# Patient Record
Sex: FEMALE | Race: BLACK OR AFRICAN AMERICAN | NOT HISPANIC OR LATINO
[De-identification: names, ages, dates, MRNs, and addresses within clinical notes are randomized per-mention and may not be internally consistent; named-entity substitution may affect disease eponyms.]

---

## 2019-06-05 PROBLEM — Z00.00 ENCOUNTER FOR PREVENTIVE HEALTH EXAMINATION: Status: ACTIVE | Noted: 2019-06-05

## 2019-08-02 ENCOUNTER — APPOINTMENT (OUTPATIENT)
Dept: VASCULAR SURGERY | Facility: CLINIC | Age: 84
End: 2019-08-02
Payer: MEDICARE

## 2019-08-02 VITALS
HEIGHT: 62 IN | TEMPERATURE: 98.1 F | DIASTOLIC BLOOD PRESSURE: 72 MMHG | BODY MASS INDEX: 30.91 KG/M2 | SYSTOLIC BLOOD PRESSURE: 166 MMHG | WEIGHT: 168 LBS | HEART RATE: 75 BPM

## 2019-08-02 DIAGNOSIS — I10 ESSENTIAL (PRIMARY) HYPERTENSION: ICD-10-CM

## 2019-08-02 DIAGNOSIS — I73.9 PERIPHERAL VASCULAR DISEASE, UNSPECIFIED: ICD-10-CM

## 2019-08-02 DIAGNOSIS — E11.9 TYPE 2 DIABETES MELLITUS W/OUT COMPLICATIONS: ICD-10-CM

## 2019-08-02 DIAGNOSIS — Z87.39 PERSONAL HISTORY OF OTHER DISEASES OF THE MUSCULOSKELETAL SYSTEM AND CONNECTIVE TISSUE: ICD-10-CM

## 2019-08-02 PROCEDURE — 93923 UPR/LXTR ART STDY 3+ LVLS: CPT

## 2019-08-02 PROCEDURE — 99204 OFFICE O/P NEW MOD 45 MIN: CPT

## 2019-08-02 RX ORDER — B-COMPLEX WITH VITAMIN C
TABLET ORAL
Refills: 0 | Status: ACTIVE | COMMUNITY

## 2019-08-02 RX ORDER — METFORMIN HYDROCHLORIDE 625 MG/1
TABLET ORAL
Refills: 0 | Status: ACTIVE | COMMUNITY

## 2019-08-02 NOTE — PHYSICAL EXAM
[JVD] : no jugular venous distention  [Normal Breath Sounds] : Normal breath sounds [Normal Heart Sounds] : normal heart sounds [2+] : left 2+ [Ankle Swelling (On Exam)] : present [Ankle Swelling Bilaterally] : bilaterally  [Ankle Swelling On The Right] : mild [Varicose Veins Of Lower Extremities] : not present [] : not present [Abdomen Masses] : No abdominal masses [Skin Ulcer] : no ulcer [Oriented to Place] : oriented to place

## 2019-08-02 NOTE — ASSESSMENT
[FreeTextEntry1] : Patient with bilateral lower extremity discomfort. 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 I do not believe symptoms are related to arterial insufficiency. Recommend medical management with neurology followup. This was discussed with the patient in detail.

## 2019-08-02 NOTE — CONSULT LETTER
[Dear  ___] : Dear  [unfilled], [Consult Letter:] : I had the pleasure of evaluating your patient, [unfilled]. [Please see my note below.] : Please see my note below. [Sincerely,] : Sincerely, [Consult Closing:] : Thank you very much for allowing me to participate in the care of this patient.  If you have any questions, please do not hesitate to contact me. [FreeTextEntry3] : José Miguel Arguelles M.D., F.SKYLAR.S., R.P.V.I.\par  of Vascular Surgery\par Chief, Vascular Surgery at [facility]\par Chief, Endovascular Surgery at [facility]\par Medical Director of Endovascular Program\par [facility]\par

## 2019-08-02 NOTE — HISTORY OF PRESENT ILLNESS
[FreeTextEntry1] : Patient is an 86-year-old female with past medical history significant for diabetes, hypertension presenting to us for evaluation of circulation of both legs. Patient complaining of heaviness and discomfort in both legs, right worse than left mainly while she is sleeping. Patient also complains of significant lower back pain. No complaint of claudication symptoms. No history of tissue loss. No cardiac history. Patient is not a smoker.

## 2022-01-01 ENCOUNTER — RESULT REVIEW (OUTPATIENT)
Age: 87
End: 2022-01-01

## 2022-01-01 ENCOUNTER — INPATIENT (INPATIENT)
Facility: HOSPITAL | Age: 87
LOS: 5 days | DRG: 834 | End: 2022-04-20
Attending: INTERNAL MEDICINE | Admitting: STUDENT IN AN ORGANIZED HEALTH CARE EDUCATION/TRAINING PROGRAM
Payer: COMMERCIAL

## 2022-01-01 ENCOUNTER — TRANSCRIPTION ENCOUNTER (OUTPATIENT)
Age: 87
End: 2022-01-01

## 2022-01-01 ENCOUNTER — INPATIENT (INPATIENT)
Facility: HOSPITAL | Age: 87
LOS: 1 days | Discharge: ANOTHER HOSPITAL | End: 2022-04-14
Attending: HOSPITALIST | Admitting: HOSPITALIST
Payer: MEDICARE

## 2022-01-01 VITALS
DIASTOLIC BLOOD PRESSURE: 57 MMHG | HEART RATE: 85 BPM | OXYGEN SATURATION: 97 % | RESPIRATION RATE: 18 BRPM | SYSTOLIC BLOOD PRESSURE: 122 MMHG | TEMPERATURE: 98 F

## 2022-01-01 VITALS
HEART RATE: 160 BPM | TEMPERATURE: 97 F | OXYGEN SATURATION: 98 % | SYSTOLIC BLOOD PRESSURE: 152 MMHG | RESPIRATION RATE: 24 BRPM | DIASTOLIC BLOOD PRESSURE: 60 MMHG

## 2022-01-01 VITALS — RESPIRATION RATE: 19 BRPM | HEART RATE: 76 BPM | OXYGEN SATURATION: 98 %

## 2022-01-01 DIAGNOSIS — I10 ESSENTIAL (PRIMARY) HYPERTENSION: ICD-10-CM

## 2022-01-01 DIAGNOSIS — C91.00 ACUTE LYMPHOBLASTIC LEUKEMIA NOT HAVING ACHIEVED REMISSION: ICD-10-CM

## 2022-01-01 DIAGNOSIS — J96.01 ACUTE RESPIRATORY FAILURE WITH HYPOXIA: ICD-10-CM

## 2022-01-01 DIAGNOSIS — Z90.710 ACQUIRED ABSENCE OF BOTH CERVIX AND UTERUS: Chronic | ICD-10-CM

## 2022-01-01 DIAGNOSIS — B99.9 UNSPECIFIED INFECTIOUS DISEASE: ICD-10-CM

## 2022-01-01 DIAGNOSIS — E83.39 OTHER DISORDERS OF PHOSPHORUS METABOLISM: ICD-10-CM

## 2022-01-01 DIAGNOSIS — Z29.9 ENCOUNTER FOR PROPHYLACTIC MEASURES, UNSPECIFIED: ICD-10-CM

## 2022-01-01 DIAGNOSIS — E88.3 TUMOR LYSIS SYNDROME: ICD-10-CM

## 2022-01-01 DIAGNOSIS — C92.10 CHRONIC MYELOID LEUKEMIA, BCR/ABL-POSITIVE, NOT HAVING ACHIEVED REMISSION: ICD-10-CM

## 2022-01-01 DIAGNOSIS — E11.9 TYPE 2 DIABETES MELLITUS WITHOUT COMPLICATIONS: ICD-10-CM

## 2022-01-01 DIAGNOSIS — R09.89 OTHER SPECIFIED SYMPTOMS AND SIGNS INVOLVING THE CIRCULATORY AND RESPIRATORY SYSTEMS: ICD-10-CM

## 2022-01-01 DIAGNOSIS — Z51.5 ENCOUNTER FOR PALLIATIVE CARE: ICD-10-CM

## 2022-01-01 DIAGNOSIS — E87.6 HYPOKALEMIA: ICD-10-CM

## 2022-01-01 DIAGNOSIS — C92.00 ACUTE MYELOBLASTIC LEUKEMIA, NOT HAVING ACHIEVED REMISSION: ICD-10-CM

## 2022-01-01 DIAGNOSIS — T14.8XXA OTHER INJURY OF UNSPECIFIED BODY REGION, INITIAL ENCOUNTER: ICD-10-CM

## 2022-01-01 DIAGNOSIS — D72.829 ELEVATED WHITE BLOOD CELL COUNT, UNSPECIFIED: ICD-10-CM

## 2022-01-01 DIAGNOSIS — R53.81 OTHER MALAISE: ICD-10-CM

## 2022-01-01 DIAGNOSIS — R10.13 EPIGASTRIC PAIN: ICD-10-CM

## 2022-01-01 DIAGNOSIS — R53.83 OTHER FATIGUE: ICD-10-CM

## 2022-01-01 DIAGNOSIS — E87.8 OTHER DISORDERS OF ELECTROLYTE AND FLUID BALANCE, NOT ELSEWHERE CLASSIFIED: ICD-10-CM

## 2022-01-01 DIAGNOSIS — I48.91 UNSPECIFIED ATRIAL FIBRILLATION: ICD-10-CM

## 2022-01-01 DIAGNOSIS — D63.0 ANEMIA IN NEOPLASTIC DISEASE: ICD-10-CM

## 2022-01-01 DIAGNOSIS — R79.1 ABNORMAL COAGULATION PROFILE: ICD-10-CM

## 2022-01-01 DIAGNOSIS — D75.89 OTHER SPECIFIED DISEASES OF BLOOD AND BLOOD-FORMING ORGANS: ICD-10-CM

## 2022-01-01 DIAGNOSIS — I26.99 OTHER PULMONARY EMBOLISM WITHOUT ACUTE COR PULMONALE: ICD-10-CM

## 2022-01-01 DIAGNOSIS — I48.20 CHRONIC ATRIAL FIBRILLATION, UNSPECIFIED: ICD-10-CM

## 2022-01-01 LAB
A1C WITH ESTIMATED AVERAGE GLUCOSE RESULT: 9 % — HIGH (ref 4–5.6)
ALBUMIN SERPL ELPH-MCNC: 2.4 G/DL — LOW (ref 3.3–5)
ALBUMIN SERPL ELPH-MCNC: 2.7 G/DL — LOW (ref 3.3–5)
ALBUMIN SERPL ELPH-MCNC: 2.8 G/DL — LOW (ref 3.3–5)
ALBUMIN SERPL ELPH-MCNC: 2.9 G/DL — LOW (ref 3.3–5)
ALBUMIN SERPL ELPH-MCNC: 2.9 G/DL — LOW (ref 3.3–5)
ALBUMIN SERPL ELPH-MCNC: 3 G/DL — LOW (ref 3.3–5)
ALBUMIN SERPL ELPH-MCNC: 3.1 G/DL — LOW (ref 3.3–5)
ALBUMIN SERPL ELPH-MCNC: 3.1 G/DL — LOW (ref 3.3–5)
ALBUMIN SERPL ELPH-MCNC: 3.2 G/DL — LOW (ref 3.3–5)
ALBUMIN SERPL ELPH-MCNC: 3.2 G/DL — LOW (ref 3.3–5)
ALBUMIN SERPL ELPH-MCNC: 3.3 G/DL — SIGNIFICANT CHANGE UP (ref 3.3–5)
ALBUMIN SERPL ELPH-MCNC: 3.4 G/DL — SIGNIFICANT CHANGE UP (ref 3.3–5)
ALBUMIN SERPL ELPH-MCNC: 3.4 G/DL — SIGNIFICANT CHANGE UP (ref 3.3–5)
ALBUMIN SERPL ELPH-MCNC: 3.5 G/DL — SIGNIFICANT CHANGE UP (ref 3.3–5)
ALP SERPL-CCNC: 50 U/L — SIGNIFICANT CHANGE UP (ref 40–120)
ALP SERPL-CCNC: 50 U/L — SIGNIFICANT CHANGE UP (ref 40–120)
ALP SERPL-CCNC: 51 U/L — SIGNIFICANT CHANGE UP (ref 40–120)
ALP SERPL-CCNC: 51 U/L — SIGNIFICANT CHANGE UP (ref 40–120)
ALP SERPL-CCNC: 53 U/L — SIGNIFICANT CHANGE UP (ref 40–120)
ALP SERPL-CCNC: 56 U/L — SIGNIFICANT CHANGE UP (ref 40–120)
ALP SERPL-CCNC: 57 U/L — SIGNIFICANT CHANGE UP (ref 40–120)
ALP SERPL-CCNC: 58 U/L — SIGNIFICANT CHANGE UP (ref 40–120)
ALP SERPL-CCNC: 59 U/L — SIGNIFICANT CHANGE UP (ref 40–120)
ALP SERPL-CCNC: 60 U/L — SIGNIFICANT CHANGE UP (ref 40–120)
ALP SERPL-CCNC: 61 U/L — SIGNIFICANT CHANGE UP (ref 40–120)
ALP SERPL-CCNC: 63 U/L — SIGNIFICANT CHANGE UP (ref 40–120)
ALP SERPL-CCNC: 64 U/L — SIGNIFICANT CHANGE UP (ref 40–120)
ALP SERPL-CCNC: 64 U/L — SIGNIFICANT CHANGE UP (ref 40–120)
ALP SERPL-CCNC: 65 U/L — SIGNIFICANT CHANGE UP (ref 40–120)
ALP SERPL-CCNC: 78 U/L — SIGNIFICANT CHANGE UP (ref 40–120)
ALP SERPL-CCNC: 79 U/L — SIGNIFICANT CHANGE UP (ref 40–120)
ALP SERPL-CCNC: 81 U/L — SIGNIFICANT CHANGE UP (ref 40–120)
ALP SERPL-CCNC: 88 U/L — SIGNIFICANT CHANGE UP (ref 40–120)
ALT FLD-CCNC: 13 U/L — SIGNIFICANT CHANGE UP (ref 4–33)
ALT FLD-CCNC: 16 U/L — SIGNIFICANT CHANGE UP (ref 4–33)
ALT FLD-CCNC: 17 U/L — SIGNIFICANT CHANGE UP (ref 4–33)
ALT FLD-CCNC: 17 U/L — SIGNIFICANT CHANGE UP (ref 4–33)
ALT FLD-CCNC: 19 U/L — SIGNIFICANT CHANGE UP (ref 4–33)
ALT FLD-CCNC: 21 U/L — SIGNIFICANT CHANGE UP (ref 10–45)
ALT FLD-CCNC: 22 U/L — SIGNIFICANT CHANGE UP (ref 10–45)
ALT FLD-CCNC: 24 U/L — SIGNIFICANT CHANGE UP (ref 10–45)
ALT FLD-CCNC: 25 U/L — SIGNIFICANT CHANGE UP (ref 10–45)
ALT FLD-CCNC: 25 U/L — SIGNIFICANT CHANGE UP (ref 4–33)
ALT FLD-CCNC: 253 U/L — HIGH (ref 10–45)
ALT FLD-CCNC: 26 U/L — SIGNIFICANT CHANGE UP (ref 10–45)
ALT FLD-CCNC: 28 U/L — SIGNIFICANT CHANGE UP (ref 10–45)
ALT FLD-CCNC: 28 U/L — SIGNIFICANT CHANGE UP (ref 10–45)
ALT FLD-CCNC: 30 U/L — SIGNIFICANT CHANGE UP (ref 10–45)
ALT FLD-CCNC: 30 U/L — SIGNIFICANT CHANGE UP (ref 10–45)
ALT FLD-CCNC: 31 U/L — SIGNIFICANT CHANGE UP (ref 10–45)
ALT FLD-CCNC: 31 U/L — SIGNIFICANT CHANGE UP (ref 10–45)
ALT FLD-CCNC: 32 U/L — SIGNIFICANT CHANGE UP (ref 10–45)
ANION GAP SERPL CALC-SCNC: 10 MMOL/L — SIGNIFICANT CHANGE UP (ref 5–17)
ANION GAP SERPL CALC-SCNC: 11 MMOL/L — SIGNIFICANT CHANGE UP (ref 5–17)
ANION GAP SERPL CALC-SCNC: 12 MMOL/L — SIGNIFICANT CHANGE UP (ref 5–17)
ANION GAP SERPL CALC-SCNC: 13 MMOL/L — SIGNIFICANT CHANGE UP (ref 5–17)
ANION GAP SERPL CALC-SCNC: 13 MMOL/L — SIGNIFICANT CHANGE UP (ref 5–17)
ANION GAP SERPL CALC-SCNC: 13 MMOL/L — SIGNIFICANT CHANGE UP (ref 7–14)
ANION GAP SERPL CALC-SCNC: 14 MMOL/L — SIGNIFICANT CHANGE UP (ref 7–14)
ANION GAP SERPL CALC-SCNC: 15 MMOL/L — HIGH (ref 7–14)
ANION GAP SERPL CALC-SCNC: 15 MMOL/L — SIGNIFICANT CHANGE UP (ref 5–17)
ANION GAP SERPL CALC-SCNC: 16 MMOL/L — HIGH (ref 7–14)
ANION GAP SERPL CALC-SCNC: 16 MMOL/L — SIGNIFICANT CHANGE UP (ref 5–17)
ANION GAP SERPL CALC-SCNC: 17 MMOL/L — HIGH (ref 7–14)
ANION GAP SERPL CALC-SCNC: 17 MMOL/L — SIGNIFICANT CHANGE UP (ref 5–17)
ANION GAP SERPL CALC-SCNC: 18 MMOL/L — HIGH (ref 5–17)
ANION GAP SERPL CALC-SCNC: 21 MMOL/L — HIGH (ref 7–14)
ANION GAP SERPL CALC-SCNC: 29 MMOL/L — HIGH (ref 5–17)
ANION GAP SERPL CALC-SCNC: 8 MMOL/L — SIGNIFICANT CHANGE UP (ref 5–17)
ANISOCYTOSIS BLD QL: SLIGHT — SIGNIFICANT CHANGE UP
APTT BLD: 103 SEC — HIGH (ref 27.5–35.5)
APTT BLD: 110.3 SEC — HIGH (ref 27.5–35.5)
APTT BLD: 134.3 SEC — CRITICAL HIGH (ref 27.5–35.5)
APTT BLD: 153.5 SEC — CRITICAL HIGH (ref 27.5–35.5)
APTT BLD: 21.5 SEC — LOW (ref 27–36.3)
APTT BLD: 22.6 SEC — LOW (ref 27.5–35.5)
APTT BLD: 25 SEC — LOW (ref 27.5–35.5)
APTT BLD: 25.1 SEC — LOW (ref 27.5–35.5)
APTT BLD: 49.1 SEC — HIGH (ref 27.5–35.5)
APTT BLD: 49.5 SEC — HIGH (ref 27.5–35.5)
APTT BLD: 54.1 SEC — HIGH (ref 27.5–35.5)
APTT BLD: 54.6 SEC — HIGH (ref 27.5–35.5)
APTT BLD: 56.2 SEC — HIGH (ref 27.5–35.5)
APTT BLD: 58.7 SEC — HIGH (ref 27–36.3)
APTT BLD: 61.2 SEC — HIGH (ref 27.5–35.5)
APTT BLD: 63 SEC — HIGH (ref 27.5–35.5)
APTT BLD: 68 SEC — HIGH (ref 27.5–35.5)
APTT BLD: 72.3 SEC — HIGH (ref 27–36.3)
APTT BLD: 74.3 SEC — HIGH (ref 27.5–35.5)
APTT BLD: 87.6 SEC — HIGH (ref 27.5–35.5)
APTT BLD: 95 SEC — HIGH (ref 27.5–35.5)
APTT BLD: 99.1 SEC — HIGH (ref 27.5–35.5)
APTT BLD: >200 SEC — CRITICAL HIGH (ref 27.5–35.5)
AST SERPL-CCNC: 22 U/L — SIGNIFICANT CHANGE UP (ref 10–40)
AST SERPL-CCNC: 26 U/L — SIGNIFICANT CHANGE UP (ref 4–32)
AST SERPL-CCNC: 28 U/L — SIGNIFICANT CHANGE UP (ref 10–40)
AST SERPL-CCNC: 29 U/L — SIGNIFICANT CHANGE UP (ref 4–32)
AST SERPL-CCNC: 31 U/L — SIGNIFICANT CHANGE UP (ref 10–40)
AST SERPL-CCNC: 32 U/L — SIGNIFICANT CHANGE UP (ref 10–40)
AST SERPL-CCNC: 33 U/L — SIGNIFICANT CHANGE UP (ref 10–40)
AST SERPL-CCNC: 33 U/L — SIGNIFICANT CHANGE UP (ref 10–40)
AST SERPL-CCNC: 34 U/L — HIGH (ref 4–32)
AST SERPL-CCNC: 36 U/L — HIGH (ref 4–32)
AST SERPL-CCNC: 37 U/L — HIGH (ref 4–32)
AST SERPL-CCNC: 40 U/L — HIGH (ref 4–32)
AST SERPL-CCNC: 40 U/L — SIGNIFICANT CHANGE UP (ref 10–40)
AST SERPL-CCNC: 41 U/L — HIGH (ref 10–40)
AST SERPL-CCNC: 41 U/L — HIGH (ref 10–40)
AST SERPL-CCNC: 42 U/L — HIGH (ref 10–40)
AST SERPL-CCNC: 440 U/L — HIGH (ref 10–40)
AST SERPL-CCNC: 45 U/L — HIGH (ref 10–40)
AST SERPL-CCNC: 57 U/L — HIGH (ref 10–40)
B PERT DNA SPEC QL NAA+PROBE: SIGNIFICANT CHANGE UP
B PERT+PARAPERT DNA PNL SPEC NAA+PROBE: SIGNIFICANT CHANGE UP
B-LACTOGLOB IGG RAST: SIGNIFICANT CHANGE UP
BASE EXCESS BLDV CALC-SCNC: -7 MMOL/L — LOW (ref -2–2)
BASE EXCESS BLDV CALC-SCNC: 2.8 MMOL/L — SIGNIFICANT CHANGE UP (ref -2–3)
BASOPHILS # BLD AUTO: 0 K/UL — SIGNIFICANT CHANGE UP (ref 0–0.2)
BASOPHILS # BLD AUTO: 0.04 K/UL — SIGNIFICANT CHANGE UP (ref 0–0.2)
BASOPHILS # BLD AUTO: 0.07 K/UL — SIGNIFICANT CHANGE UP (ref 0–0.2)
BASOPHILS # BLD AUTO: 0.13 K/UL — SIGNIFICANT CHANGE UP (ref 0–0.2)
BASOPHILS # BLD AUTO: 0.18 K/UL — SIGNIFICANT CHANGE UP (ref 0–0.2)
BASOPHILS NFR BLD AUTO: 0 % — SIGNIFICANT CHANGE UP (ref 0–2)
BASOPHILS NFR BLD AUTO: 0.1 % — SIGNIFICANT CHANGE UP (ref 0–2)
BASOPHILS NFR BLD AUTO: 0.2 % — SIGNIFICANT CHANGE UP (ref 0–2)
BCR/ABL BY RT - PCR QUANTITATIVE: SIGNIFICANT CHANGE UP
BILIRUB DIRECT SERPL-MCNC: 0.6 MG/DL — HIGH (ref 0–0.3)
BILIRUB SERPL-MCNC: 0.6 MG/DL — SIGNIFICANT CHANGE UP (ref 0.2–1.2)
BILIRUB SERPL-MCNC: 0.6 MG/DL — SIGNIFICANT CHANGE UP (ref 0.2–1.2)
BILIRUB SERPL-MCNC: 0.7 MG/DL — SIGNIFICANT CHANGE UP (ref 0.2–1.2)
BILIRUB SERPL-MCNC: 0.8 MG/DL — SIGNIFICANT CHANGE UP (ref 0.2–1.2)
BILIRUB SERPL-MCNC: 1 MG/DL — SIGNIFICANT CHANGE UP (ref 0.2–1.2)
BILIRUB SERPL-MCNC: 1.1 MG/DL — SIGNIFICANT CHANGE UP (ref 0.2–1.2)
BILIRUB SERPL-MCNC: 1.1 MG/DL — SIGNIFICANT CHANGE UP (ref 0.2–1.2)
BILIRUB SERPL-MCNC: 1.2 MG/DL — SIGNIFICANT CHANGE UP (ref 0.2–1.2)
BILIRUB SERPL-MCNC: 1.2 MG/DL — SIGNIFICANT CHANGE UP (ref 0.2–1.2)
BILIRUB SERPL-MCNC: 1.3 MG/DL — HIGH (ref 0.2–1.2)
BILIRUB SERPL-MCNC: 1.4 MG/DL — HIGH (ref 0.2–1.2)
BILIRUB SERPL-MCNC: 1.6 MG/DL — HIGH (ref 0.2–1.2)
BILIRUB SERPL-MCNC: 1.8 MG/DL — HIGH (ref 0.2–1.2)
BILIRUB SERPL-MCNC: 2 MG/DL — HIGH (ref 0.2–1.2)
BLASTS # FLD: 88 % — CRITICAL HIGH (ref 0–0)
BLD GP AB SCN SERPL QL: NEGATIVE — SIGNIFICANT CHANGE UP
BLOOD GAS VENOUS COMPREHENSIVE RESULT: SIGNIFICANT CHANGE UP
BLUEBERRY IGG RAST: (no result)
BORDETELLA PARAPERTUSSIS (RAPRVP): SIGNIFICANT CHANGE UP
BROCCOLI IGG RAST: SIGNIFICANT CHANGE UP
BUN SERPL-MCNC: 14 MG/DL — SIGNIFICANT CHANGE UP (ref 7–23)
BUN SERPL-MCNC: 15 MG/DL — SIGNIFICANT CHANGE UP (ref 7–23)
BUN SERPL-MCNC: 16 MG/DL — SIGNIFICANT CHANGE UP (ref 7–23)
BUN SERPL-MCNC: 18 MG/DL — SIGNIFICANT CHANGE UP (ref 7–23)
BUN SERPL-MCNC: 18 MG/DL — SIGNIFICANT CHANGE UP (ref 7–23)
BUN SERPL-MCNC: 19 MG/DL — SIGNIFICANT CHANGE UP (ref 7–23)
BUN SERPL-MCNC: 20 MG/DL — SIGNIFICANT CHANGE UP (ref 7–23)
BUN SERPL-MCNC: 20 MG/DL — SIGNIFICANT CHANGE UP (ref 7–23)
BUN SERPL-MCNC: 23 MG/DL — SIGNIFICANT CHANGE UP (ref 7–23)
BUN SERPL-MCNC: 23 MG/DL — SIGNIFICANT CHANGE UP (ref 7–23)
BUN SERPL-MCNC: 27 MG/DL — HIGH (ref 7–23)
BUN SERPL-MCNC: 28 MG/DL — HIGH (ref 7–23)
BUN SERPL-MCNC: 28 MG/DL — HIGH (ref 7–23)
BUN SERPL-MCNC: 30 MG/DL — HIGH (ref 7–23)
BUN SERPL-MCNC: 30 MG/DL — HIGH (ref 7–23)
C PNEUM DNA SPEC QL NAA+PROBE: SIGNIFICANT CHANGE UP
CA-I SERPL-SCNC: 0.91 MMOL/L — LOW (ref 1.15–1.33)
CABBAGE IGG RAST: SIGNIFICANT CHANGE UP
CALCIUM SERPL-MCNC: 12.8 MG/DL — HIGH (ref 8.4–10.5)
CALCIUM SERPL-MCNC: 7.1 MG/DL — LOW (ref 8.4–10.5)
CALCIUM SERPL-MCNC: 7.3 MG/DL — LOW (ref 8.4–10.5)
CALCIUM SERPL-MCNC: 7.4 MG/DL — LOW (ref 8.4–10.5)
CALCIUM SERPL-MCNC: 7.5 MG/DL — LOW (ref 8.4–10.5)
CALCIUM SERPL-MCNC: 7.6 MG/DL — LOW (ref 8.4–10.5)
CALCIUM SERPL-MCNC: 7.6 MG/DL — LOW (ref 8.4–10.5)
CALCIUM SERPL-MCNC: 7.7 MG/DL — LOW (ref 8.4–10.5)
CALCIUM SERPL-MCNC: 7.8 MG/DL — LOW (ref 8.4–10.5)
CALCIUM SERPL-MCNC: 7.9 MG/DL — LOW (ref 8.4–10.5)
CALCIUM SERPL-MCNC: 8.3 MG/DL — LOW (ref 8.4–10.5)
CALCIUM SERPL-MCNC: 8.7 MG/DL — SIGNIFICANT CHANGE UP (ref 8.4–10.5)
CALCIUM SERPL-MCNC: 9 MG/DL — SIGNIFICANT CHANGE UP (ref 8.4–10.5)
CALCIUM SERPL-MCNC: 9.1 MG/DL — SIGNIFICANT CHANGE UP (ref 8.4–10.5)
CALCIUM SERPL-MCNC: 9.5 MG/DL — SIGNIFICANT CHANGE UP (ref 8.4–10.5)
CARDAMON IGE RAST: SIGNIFICANT CHANGE UP
CARP IGE RAST: SIGNIFICANT CHANGE UP
CARROT IGG RAST: SIGNIFICANT CHANGE UP
CASEIN IGG RAST: SIGNIFICANT CHANGE UP
CAULIFLOWER IGG RAST: SIGNIFICANT CHANGE UP
CHLORIDE BLDV-SCNC: 102 MMOL/L — SIGNIFICANT CHANGE UP (ref 96–108)
CHLORIDE BLDV-SCNC: 111 MMOL/L — HIGH (ref 96–108)
CHLORIDE SERPL-SCNC: 100 MMOL/L — SIGNIFICANT CHANGE UP (ref 98–107)
CHLORIDE SERPL-SCNC: 100 MMOL/L — SIGNIFICANT CHANGE UP (ref 98–107)
CHLORIDE SERPL-SCNC: 101 MMOL/L — SIGNIFICANT CHANGE UP (ref 96–108)
CHLORIDE SERPL-SCNC: 102 MMOL/L — SIGNIFICANT CHANGE UP (ref 96–108)
CHLORIDE SERPL-SCNC: 102 MMOL/L — SIGNIFICANT CHANGE UP (ref 98–107)
CHLORIDE SERPL-SCNC: 103 MMOL/L — SIGNIFICANT CHANGE UP (ref 96–108)
CHLORIDE SERPL-SCNC: 103 MMOL/L — SIGNIFICANT CHANGE UP (ref 98–107)
CHLORIDE SERPL-SCNC: 103 MMOL/L — SIGNIFICANT CHANGE UP (ref 98–107)
CHLORIDE SERPL-SCNC: 104 MMOL/L — SIGNIFICANT CHANGE UP (ref 96–108)
CHLORIDE SERPL-SCNC: 84 MMOL/L — LOW (ref 98–107)
CHLORIDE SERPL-SCNC: 98 MMOL/L — SIGNIFICANT CHANGE UP (ref 96–108)
CHLORIDE SERPL-SCNC: 98 MMOL/L — SIGNIFICANT CHANGE UP (ref 96–108)
CHROM ANALY INTERPHASE BLD FISH-IMP: SIGNIFICANT CHANGE UP
CHROM ANALY INTERPHASE BLD FISH-IMP: SIGNIFICANT CHANGE UP
CO2 BLDV-SCNC: 20 MMOL/L — LOW (ref 22–26)
CO2 BLDV-SCNC: 28.4 MMOL/L — HIGH (ref 22–26)
CO2 SERPL-SCNC: 12 MMOL/L — LOW (ref 22–31)
CO2 SERPL-SCNC: 16 MMOL/L — LOW (ref 22–31)
CO2 SERPL-SCNC: 16 MMOL/L — LOW (ref 22–31)
CO2 SERPL-SCNC: 18 MMOL/L — LOW (ref 22–31)
CO2 SERPL-SCNC: 22 MMOL/L — SIGNIFICANT CHANGE UP (ref 22–31)
CO2 SERPL-SCNC: 23 MMOL/L — SIGNIFICANT CHANGE UP (ref 22–31)
CO2 SERPL-SCNC: 24 MMOL/L — SIGNIFICANT CHANGE UP (ref 22–31)
CO2 SERPL-SCNC: 25 MMOL/L — SIGNIFICANT CHANGE UP (ref 22–31)
CO2 SERPL-SCNC: 26 MMOL/L — SIGNIFICANT CHANGE UP (ref 22–31)
CO2 SERPL-SCNC: 27 MMOL/L — SIGNIFICANT CHANGE UP (ref 22–31)
CREAT SERPL-MCNC: 0.7 MG/DL — SIGNIFICANT CHANGE UP (ref 0.5–1.3)
CREAT SERPL-MCNC: 0.72 MG/DL — SIGNIFICANT CHANGE UP (ref 0.5–1.3)
CREAT SERPL-MCNC: 0.73 MG/DL — SIGNIFICANT CHANGE UP (ref 0.5–1.3)
CREAT SERPL-MCNC: 0.74 MG/DL — SIGNIFICANT CHANGE UP (ref 0.5–1.3)
CREAT SERPL-MCNC: 0.77 MG/DL — SIGNIFICANT CHANGE UP (ref 0.5–1.3)
CREAT SERPL-MCNC: 0.78 MG/DL — SIGNIFICANT CHANGE UP (ref 0.5–1.3)
CREAT SERPL-MCNC: 0.8 MG/DL — SIGNIFICANT CHANGE UP (ref 0.5–1.3)
CREAT SERPL-MCNC: 0.81 MG/DL — SIGNIFICANT CHANGE UP (ref 0.5–1.3)
CREAT SERPL-MCNC: 0.82 MG/DL — SIGNIFICANT CHANGE UP (ref 0.5–1.3)
CREAT SERPL-MCNC: 0.85 MG/DL — SIGNIFICANT CHANGE UP (ref 0.5–1.3)
CREAT SERPL-MCNC: 0.86 MG/DL — SIGNIFICANT CHANGE UP (ref 0.5–1.3)
CREAT SERPL-MCNC: 0.86 MG/DL — SIGNIFICANT CHANGE UP (ref 0.5–1.3)
CREAT SERPL-MCNC: 1.06 MG/DL — SIGNIFICANT CHANGE UP (ref 0.5–1.3)
CULTURE RESULTS: SIGNIFICANT CHANGE UP
CULTURE RESULTS: SIGNIFICANT CHANGE UP
D DIMER BLD IA.RAPID-MCNC: 3903 NG/ML DDU — HIGH
D DIMER BLD IA.RAPID-MCNC: 5857 NG/ML DDU — HIGH
D DIMER BLD IA.RAPID-MCNC: 9395 NG/ML DDU — HIGH
D DIMER BLD IA.RAPID-MCNC: HIGH NG/ML DDU
D DIMER BLD IA.RAPID-MCNC: SIGNIFICANT CHANGE UP NG/ML DDU
EGFR: 50 ML/MIN/1.73M2 — LOW
EGFR: 65 ML/MIN/1.73M2 — SIGNIFICANT CHANGE UP
EGFR: 68 ML/MIN/1.73M2 — SIGNIFICANT CHANGE UP
EGFR: 69 ML/MIN/1.73M2 — SIGNIFICANT CHANGE UP
EGFR: 70 ML/MIN/1.73M2 — SIGNIFICANT CHANGE UP
EGFR: 73 ML/MIN/1.73M2 — SIGNIFICANT CHANGE UP
EGFR: 74 ML/MIN/1.73M2 — SIGNIFICANT CHANGE UP
EGFR: 77 ML/MIN/1.73M2 — SIGNIFICANT CHANGE UP
EGFR: 79 ML/MIN/1.73M2 — SIGNIFICANT CHANGE UP
EGFR: 80 ML/MIN/1.73M2 — SIGNIFICANT CHANGE UP
EGFR: 83 ML/MIN/1.73M2 — SIGNIFICANT CHANGE UP
EOSINOPHIL # BLD AUTO: 0 K/UL — SIGNIFICANT CHANGE UP (ref 0–0.5)
EOSINOPHIL # BLD AUTO: 0.01 K/UL — SIGNIFICANT CHANGE UP (ref 0–0.5)
EOSINOPHIL NFR BLD AUTO: 0 % — SIGNIFICANT CHANGE UP (ref 0–6)
ESTIMATED AVERAGE GLUCOSE: 212 — SIGNIFICANT CHANGE UP
FIBRINOGEN PPP-MCNC: 267 MG/DL — LOW (ref 330–520)
FIBRINOGEN PPP-MCNC: 274 MG/DL — LOW (ref 330–520)
FIBRINOGEN PPP-MCNC: 284 MG/DL — LOW (ref 330–520)
FIBRINOGEN PPP-MCNC: 314 MG/DL — LOW (ref 330–520)
FIBRINOGEN PPP-MCNC: 369 MG/DL — SIGNIFICANT CHANGE UP (ref 330–520)
FIBRINOGEN PPP-MCNC: 374 MG/DL — SIGNIFICANT CHANGE UP (ref 330–520)
FIBRINOGEN PPP-MCNC: 401 MG/DL — SIGNIFICANT CHANGE UP (ref 330–520)
FIBRINOGEN PPP-MCNC: 477 MG/DL — SIGNIFICANT CHANGE UP (ref 330–520)
FLUAV SUBTYP SPEC NAA+PROBE: SIGNIFICANT CHANGE UP
FLUBV RNA SPEC QL NAA+PROBE: SIGNIFICANT CHANGE UP
G6PD RBC-CCNC: 32.6 U/G HGB — HIGH (ref 7–20.5)
GAS PNL BLDA: SIGNIFICANT CHANGE UP
GAS PNL BLDV: 136 MMOL/L — SIGNIFICANT CHANGE UP (ref 136–145)
GAS PNL BLDV: 140 MMOL/L — SIGNIFICANT CHANGE UP (ref 136–145)
GAS PNL BLDV: SIGNIFICANT CHANGE UP
GLUCOSE BLDC GLUCOMTR-MCNC: 105 MG/DL — HIGH (ref 70–99)
GLUCOSE BLDC GLUCOMTR-MCNC: 114 MG/DL — HIGH (ref 70–99)
GLUCOSE BLDC GLUCOMTR-MCNC: 117 MG/DL — HIGH (ref 70–99)
GLUCOSE BLDC GLUCOMTR-MCNC: 135 MG/DL — HIGH (ref 70–99)
GLUCOSE BLDC GLUCOMTR-MCNC: 141 MG/DL — HIGH (ref 70–99)
GLUCOSE BLDC GLUCOMTR-MCNC: 149 MG/DL — HIGH (ref 70–99)
GLUCOSE BLDC GLUCOMTR-MCNC: 152 MG/DL — HIGH (ref 70–99)
GLUCOSE BLDC GLUCOMTR-MCNC: 157 MG/DL — HIGH (ref 70–99)
GLUCOSE BLDC GLUCOMTR-MCNC: 158 MG/DL — HIGH (ref 70–99)
GLUCOSE BLDC GLUCOMTR-MCNC: 164 MG/DL — HIGH (ref 70–99)
GLUCOSE BLDC GLUCOMTR-MCNC: 165 MG/DL — HIGH (ref 70–99)
GLUCOSE BLDC GLUCOMTR-MCNC: 221 MG/DL — HIGH (ref 70–99)
GLUCOSE BLDC GLUCOMTR-MCNC: 224 MG/DL — HIGH (ref 70–99)
GLUCOSE BLDC GLUCOMTR-MCNC: 226 MG/DL — HIGH (ref 70–99)
GLUCOSE BLDC GLUCOMTR-MCNC: 236 MG/DL — HIGH (ref 70–99)
GLUCOSE BLDC GLUCOMTR-MCNC: 242 MG/DL — HIGH (ref 70–99)
GLUCOSE BLDC GLUCOMTR-MCNC: 243 MG/DL — HIGH (ref 70–99)
GLUCOSE BLDC GLUCOMTR-MCNC: 260 MG/DL — HIGH (ref 70–99)
GLUCOSE BLDC GLUCOMTR-MCNC: 266 MG/DL — HIGH (ref 70–99)
GLUCOSE BLDC GLUCOMTR-MCNC: 271 MG/DL — HIGH (ref 70–99)
GLUCOSE BLDC GLUCOMTR-MCNC: 299 MG/DL — HIGH (ref 70–99)
GLUCOSE BLDC GLUCOMTR-MCNC: 307 MG/DL — HIGH (ref 70–99)
GLUCOSE BLDC GLUCOMTR-MCNC: 334 MG/DL — HIGH (ref 70–99)
GLUCOSE BLDC GLUCOMTR-MCNC: 351 MG/DL — HIGH (ref 70–99)
GLUCOSE BLDC GLUCOMTR-MCNC: 363 MG/DL — HIGH (ref 70–99)
GLUCOSE BLDC GLUCOMTR-MCNC: 380 MG/DL — HIGH (ref 70–99)
GLUCOSE BLDC GLUCOMTR-MCNC: 398 MG/DL — HIGH (ref 70–99)
GLUCOSE BLDC GLUCOMTR-MCNC: 400 MG/DL — HIGH (ref 70–99)
GLUCOSE BLDC GLUCOMTR-MCNC: 407 MG/DL — HIGH (ref 70–99)
GLUCOSE BLDC GLUCOMTR-MCNC: 54 MG/DL — CRITICAL LOW (ref 70–99)
GLUCOSE BLDC GLUCOMTR-MCNC: 62 MG/DL — LOW (ref 70–99)
GLUCOSE BLDC GLUCOMTR-MCNC: 67 MG/DL — LOW (ref 70–99)
GLUCOSE BLDC GLUCOMTR-MCNC: 69 MG/DL — LOW (ref 70–99)
GLUCOSE BLDC GLUCOMTR-MCNC: 74 MG/DL — SIGNIFICANT CHANGE UP (ref 70–99)
GLUCOSE BLDC GLUCOMTR-MCNC: 76 MG/DL — SIGNIFICANT CHANGE UP (ref 70–99)
GLUCOSE BLDC GLUCOMTR-MCNC: 78 MG/DL — SIGNIFICANT CHANGE UP (ref 70–99)
GLUCOSE BLDC GLUCOMTR-MCNC: 83 MG/DL — SIGNIFICANT CHANGE UP (ref 70–99)
GLUCOSE BLDC GLUCOMTR-MCNC: 95 MG/DL — SIGNIFICANT CHANGE UP (ref 70–99)
GLUCOSE BLDV-MCNC: 156 MG/DL — HIGH (ref 70–99)
GLUCOSE BLDV-MCNC: 336 MG/DL — HIGH (ref 70–99)
GLUCOSE SERPL-MCNC: 132 MG/DL — HIGH (ref 70–99)
GLUCOSE SERPL-MCNC: 143 MG/DL — HIGH (ref 70–99)
GLUCOSE SERPL-MCNC: 163 MG/DL — HIGH (ref 70–99)
GLUCOSE SERPL-MCNC: 189 MG/DL — HIGH (ref 70–99)
GLUCOSE SERPL-MCNC: 198 MG/DL — HIGH (ref 70–99)
GLUCOSE SERPL-MCNC: 216 MG/DL — HIGH (ref 70–99)
GLUCOSE SERPL-MCNC: 285 MG/DL — HIGH (ref 70–99)
GLUCOSE SERPL-MCNC: 293 MG/DL — HIGH (ref 70–99)
GLUCOSE SERPL-MCNC: 328 MG/DL — HIGH (ref 70–99)
GLUCOSE SERPL-MCNC: 331 MG/DL — HIGH (ref 70–99)
GLUCOSE SERPL-MCNC: 337 MG/DL — HIGH (ref 70–99)
GLUCOSE SERPL-MCNC: 343 MG/DL — HIGH (ref 70–99)
GLUCOSE SERPL-MCNC: 345 MG/DL — HIGH (ref 70–99)
GLUCOSE SERPL-MCNC: 377 MG/DL — HIGH (ref 70–99)
GLUCOSE SERPL-MCNC: 56 MG/DL — LOW (ref 70–99)
GLUCOSE SERPL-MCNC: 58 MG/DL — LOW (ref 70–99)
GLUCOSE SERPL-MCNC: 63 MG/DL — LOW (ref 70–99)
GLUCOSE SERPL-MCNC: 71 MG/DL — SIGNIFICANT CHANGE UP (ref 70–99)
GLUCOSE SERPL-MCNC: 73 MG/DL — SIGNIFICANT CHANGE UP (ref 70–99)
HADV DNA SPEC QL NAA+PROBE: SIGNIFICANT CHANGE UP
HAPTOGLOB SERPL-MCNC: <20 MG/DL — LOW (ref 34–200)
HAV IGM SER-ACNC: SIGNIFICANT CHANGE UP
HBV CORE AB SER-ACNC: REACTIVE
HBV CORE IGM SER-ACNC: SIGNIFICANT CHANGE UP
HBV DNA # SERPL NAA+PROBE: SIGNIFICANT CHANGE UP
HBV DNA SERPL NAA+PROBE-LOG#: SIGNIFICANT CHANGE UP LOGIU/ML
HBV SURFACE AB SER-ACNC: REACTIVE
HBV SURFACE AG SER-ACNC: SIGNIFICANT CHANGE UP
HCO3 BLDV-SCNC: 19 MMOL/L — LOW (ref 22–29)
HCO3 BLDV-SCNC: 27 MMOL/L — SIGNIFICANT CHANGE UP (ref 22–29)
HCOV 229E RNA SPEC QL NAA+PROBE: SIGNIFICANT CHANGE UP
HCOV HKU1 RNA SPEC QL NAA+PROBE: SIGNIFICANT CHANGE UP
HCOV NL63 RNA SPEC QL NAA+PROBE: SIGNIFICANT CHANGE UP
HCOV OC43 RNA SPEC QL NAA+PROBE: SIGNIFICANT CHANGE UP
HCT VFR BLD CALC: 20.1 % — CRITICAL LOW (ref 34.5–45)
HCT VFR BLD CALC: 20.3 % — CRITICAL LOW (ref 34.5–45)
HCT VFR BLD CALC: 20.5 % — CRITICAL LOW (ref 34.5–45)
HCT VFR BLD CALC: 20.8 % — CRITICAL LOW (ref 34.5–45)
HCT VFR BLD CALC: 20.8 % — CRITICAL LOW (ref 34.5–45)
HCT VFR BLD CALC: 20.9 % — CRITICAL LOW (ref 34.5–45)
HCT VFR BLD CALC: 21.1 % — LOW (ref 34.5–45)
HCT VFR BLD CALC: 21.4 % — LOW (ref 34.5–45)
HCT VFR BLD CALC: 21.5 % — LOW (ref 34.5–45)
HCT VFR BLD CALC: 21.5 % — LOW (ref 34.5–45)
HCT VFR BLD CALC: 21.9 % — LOW (ref 34.5–45)
HCT VFR BLD CALC: 22 % — LOW (ref 34.5–45)
HCT VFR BLD CALC: 22.2 % — LOW (ref 34.5–45)
HCT VFR BLD CALC: 22.7 % — LOW (ref 34.5–45)
HCT VFR BLD CALC: 22.9 % — LOW (ref 34.5–45)
HCT VFR BLD CALC: 22.9 % — LOW (ref 34.5–45)
HCT VFR BLD CALC: 23.2 % — LOW (ref 34.5–45)
HCT VFR BLD CALC: 23.7 % — LOW (ref 34.5–45)
HCT VFR BLD CALC: 24.2 % — LOW (ref 34.5–45)
HCT VFR BLD CALC: 24.6 % — LOW (ref 34.5–45)
HCT VFR BLD CALC: 26.4 % — LOW (ref 34.5–45)
HCT VFR BLD CALC: 29.7 % — LOW (ref 34.5–45)
HCT VFR BLDA CALC: 21 % — CRITICAL LOW (ref 34.5–46.5)
HCT VFR BLDA CALC: 22 % — LOW (ref 34.5–46.5)
HCV AB S/CO SERPL IA: 0.59 S/CO — SIGNIFICANT CHANGE UP (ref 0–0.99)
HCV AB SERPL-IMP: SIGNIFICANT CHANGE UP
HEMATOPATHOLOGY REPORT: SIGNIFICANT CHANGE UP
HGB BLD CALC-MCNC: 7.1 G/DL — LOW (ref 11.5–15.5)
HGB BLD CALC-MCNC: 7.2 G/DL — LOW (ref 11.7–16.1)
HGB BLD-MCNC: 6.8 G/DL — CRITICAL LOW (ref 11.5–15.5)
HGB BLD-MCNC: 6.9 G/DL — CRITICAL LOW (ref 11.5–15.5)
HGB BLD-MCNC: 7 G/DL — CRITICAL LOW (ref 11.5–15.5)
HGB BLD-MCNC: 7 G/DL — CRITICAL LOW (ref 11.5–15.5)
HGB BLD-MCNC: 7.2 G/DL — LOW (ref 11.5–15.5)
HGB BLD-MCNC: 7.3 G/DL — LOW (ref 11.5–15.5)
HGB BLD-MCNC: 7.5 G/DL — LOW (ref 11.5–15.5)
HGB BLD-MCNC: 7.7 G/DL — LOW (ref 11.5–15.5)
HGB BLD-MCNC: 7.7 G/DL — LOW (ref 11.5–15.5)
HGB BLD-MCNC: 7.8 G/DL — LOW (ref 11.5–15.5)
HGB BLD-MCNC: 7.8 G/DL — LOW (ref 11.5–15.5)
HGB BLD-MCNC: 7.9 G/DL — LOW (ref 11.5–15.5)
HGB BLD-MCNC: 8 G/DL — LOW (ref 11.5–15.5)
HGB BLD-MCNC: 8.1 G/DL — LOW (ref 11.5–15.5)
HGB BLD-MCNC: 8.7 G/DL — LOW (ref 11.5–15.5)
HGB BLD-MCNC: 9.2 G/DL — LOW (ref 11.5–15.5)
HIV 1+2 AB+HIV1 P24 AG SERPL QL IA: SIGNIFICANT CHANGE UP
HLX FLT3 FINAL REPORT: SIGNIFICANT CHANGE UP
HMPV RNA SPEC QL NAA+PROBE: SIGNIFICANT CHANGE UP
HPIV1 RNA SPEC QL NAA+PROBE: SIGNIFICANT CHANGE UP
HPIV2 RNA SPEC QL NAA+PROBE: SIGNIFICANT CHANGE UP
HPIV3 RNA SPEC QL NAA+PROBE: SIGNIFICANT CHANGE UP
HPIV4 RNA SPEC QL NAA+PROBE: SIGNIFICANT CHANGE UP
HYPOCHROMIA BLD QL: SLIGHT — SIGNIFICANT CHANGE UP
IANC: 13.04 K/UL — HIGH (ref 1.8–7.4)
IANC: 14.31 K/UL — HIGH (ref 1.8–7.4)
IANC: 14.88 K/UL — HIGH (ref 1.8–7.4)
IANC: 21.97 K/UL — HIGH (ref 1.8–7.4)
IANC: 28.45 K/UL — HIGH (ref 1.8–7.4)
IMM GRANULOCYTES NFR BLD AUTO: 2.4 % — HIGH (ref 0–1.5)
IMM GRANULOCYTES NFR BLD AUTO: 2.9 % — HIGH (ref 0–1.5)
IMM GRANULOCYTES NFR BLD AUTO: 4.6 % — HIGH (ref 0–1.5)
IMM GRANULOCYTES NFR BLD AUTO: 5.8 % — HIGH (ref 0–1.5)
INR BLD: 1.32 RATIO — HIGH (ref 0.88–1.16)
INR BLD: 1.37 RATIO — HIGH (ref 0.88–1.16)
INR BLD: 1.49 RATIO — HIGH (ref 0.88–1.16)
INR BLD: 1.6 RATIO — HIGH (ref 0.88–1.16)
INR BLD: 1.61 RATIO — HIGH (ref 0.88–1.16)
INR BLD: 1.79 RATIO — HIGH (ref 0.88–1.16)
INR BLD: 1.99 RATIO — HIGH (ref 0.88–1.16)
LACTATE BLDV-MCNC: 2.3 MMOL/L — HIGH (ref 0.5–2)
LACTATE BLDV-MCNC: 2.3 MMOL/L — HIGH (ref 0.7–2)
LDH SERPL L TO P-CCNC: 1079 U/L — HIGH (ref 50–242)
LDH SERPL L TO P-CCNC: 1100 U/L — HIGH (ref 50–242)
LDH SERPL L TO P-CCNC: 1115 U/L — HIGH (ref 50–242)
LDH SERPL L TO P-CCNC: 1172 U/L — HIGH (ref 135–225)
LDH SERPL L TO P-CCNC: 1225 U/L — HIGH (ref 135–225)
LDH SERPL L TO P-CCNC: 1314 U/L — HIGH (ref 135–225)
LDH SERPL L TO P-CCNC: 1393 U/L — HIGH (ref 135–225)
LDH SERPL L TO P-CCNC: 1478 U/L — HIGH (ref 50–242)
LDH SERPL L TO P-CCNC: 1480 U/L — HIGH (ref 50–242)
LDH SERPL L TO P-CCNC: 1538 U/L — HIGH (ref 135–225)
LDH SERPL L TO P-CCNC: 1726 U/L — HIGH (ref 135–225)
LDH SERPL L TO P-CCNC: 536 U/L — HIGH (ref 50–242)
LDH SERPL L TO P-CCNC: 570 U/L — HIGH (ref 50–242)
LDH SERPL L TO P-CCNC: 625 U/L — HIGH (ref 50–242)
LDH SERPL L TO P-CCNC: 667 U/L — HIGH (ref 50–242)
LDH SERPL L TO P-CCNC: 840 U/L — HIGH (ref 50–242)
LDH SERPL L TO P-CCNC: 888 U/L — HIGH (ref 50–242)
LYMPHOCYTES # BLD AUTO: 0.36 K/UL — LOW (ref 1–3.3)
LYMPHOCYTES # BLD AUTO: 0.47 K/UL — LOW (ref 1–3.3)
LYMPHOCYTES # BLD AUTO: 0.81 K/UL — LOW (ref 1–3.3)
LYMPHOCYTES # BLD AUTO: 0.83 K/UL — LOW (ref 1–3.3)
LYMPHOCYTES # BLD AUTO: 1.54 K/UL — SIGNIFICANT CHANGE UP (ref 1–3.3)
LYMPHOCYTES # BLD AUTO: 10.3 % — LOW (ref 13–44)
LYMPHOCYTES # BLD AUTO: 10.8 % — LOW (ref 13–44)
LYMPHOCYTES # BLD AUTO: 11.4 % — LOW (ref 13–44)
LYMPHOCYTES # BLD AUTO: 16.1 % — SIGNIFICANT CHANGE UP (ref 13–44)
LYMPHOCYTES # BLD AUTO: 17.3 % — SIGNIFICANT CHANGE UP (ref 13–44)
LYMPHOCYTES # BLD AUTO: 18.5 K/UL — HIGH (ref 1–3.3)
LYMPHOCYTES # BLD AUTO: 19.43 K/UL — HIGH (ref 1–3.3)
LYMPHOCYTES # BLD AUTO: 25 % — SIGNIFICANT CHANGE UP (ref 13–44)
LYMPHOCYTES # BLD AUTO: 43.7 % — SIGNIFICANT CHANGE UP (ref 13–44)
LYMPHOCYTES # BLD AUTO: 6.21 K/UL — HIGH (ref 1–3.3)
LYMPHOCYTES # BLD AUTO: 7.14 K/UL — HIGH (ref 1–3.3)
LYMPHOCYTES # BLD AUTO: 76 % — HIGH (ref 13–44)
LYMPHOCYTES # BLD AUTO: 8 % — LOW (ref 13–44)
LYMPHOCYTES # BLD AUTO: 8.84 K/UL — HIGH (ref 1–3.3)
LYMPHOCYTES # BLD AUTO: 82.1 % — HIGH (ref 13–44)
LYMPHOCYTES # BLD AUTO: 83.3 % — HIGH (ref 13–44)
LYMPHOCYTES # BLD AUTO: 9.8 K/UL — HIGH (ref 1–3.3)
Lab: SIGNIFICANT CHANGE UP
M PNEUMO DNA SPEC QL NAA+PROBE: SIGNIFICANT CHANGE UP
MACROCYTES BLD QL: SLIGHT — SIGNIFICANT CHANGE UP
MAGNESIUM SERPL-MCNC: 1.2 MG/DL — LOW (ref 1.6–2.6)
MAGNESIUM SERPL-MCNC: 1.5 MG/DL — LOW (ref 1.6–2.6)
MAGNESIUM SERPL-MCNC: 1.6 MG/DL — SIGNIFICANT CHANGE UP (ref 1.6–2.6)
MAGNESIUM SERPL-MCNC: 1.7 MG/DL — SIGNIFICANT CHANGE UP (ref 1.6–2.6)
MAGNESIUM SERPL-MCNC: 1.7 MG/DL — SIGNIFICANT CHANGE UP (ref 1.6–2.6)
MAGNESIUM SERPL-MCNC: 1.8 MG/DL — SIGNIFICANT CHANGE UP (ref 1.6–2.6)
MAGNESIUM SERPL-MCNC: 1.8 MG/DL — SIGNIFICANT CHANGE UP (ref 1.6–2.6)
MAGNESIUM SERPL-MCNC: 1.9 MG/DL — SIGNIFICANT CHANGE UP (ref 1.6–2.6)
MAGNESIUM SERPL-MCNC: 2.2 MG/DL — SIGNIFICANT CHANGE UP (ref 1.6–2.6)
MAGNESIUM SERPL-MCNC: 2.6 MG/DL — SIGNIFICANT CHANGE UP (ref 1.6–2.6)
MAGNESIUM SERPL-MCNC: 8.9 MG/DL — CRITICAL HIGH (ref 1.6–2.6)
MANUAL DIF COMMENT BLD-IMP: SIGNIFICANT CHANGE UP
MANUAL SMEAR VERIFICATION: SIGNIFICANT CHANGE UP
MCHC RBC-ENTMCNC: 30 PG — SIGNIFICANT CHANGE UP (ref 27–34)
MCHC RBC-ENTMCNC: 30.1 PG — SIGNIFICANT CHANGE UP (ref 27–34)
MCHC RBC-ENTMCNC: 30.2 PG — SIGNIFICANT CHANGE UP (ref 27–34)
MCHC RBC-ENTMCNC: 30.3 PG — SIGNIFICANT CHANGE UP (ref 27–34)
MCHC RBC-ENTMCNC: 30.3 PG — SIGNIFICANT CHANGE UP (ref 27–34)
MCHC RBC-ENTMCNC: 30.5 PG — SIGNIFICANT CHANGE UP (ref 27–34)
MCHC RBC-ENTMCNC: 30.7 PG — SIGNIFICANT CHANGE UP (ref 27–34)
MCHC RBC-ENTMCNC: 30.7 PG — SIGNIFICANT CHANGE UP (ref 27–34)
MCHC RBC-ENTMCNC: 30.9 PG — SIGNIFICANT CHANGE UP (ref 27–34)
MCHC RBC-ENTMCNC: 31 GM/DL — LOW (ref 32–36)
MCHC RBC-ENTMCNC: 31 PG — SIGNIFICANT CHANGE UP (ref 27–34)
MCHC RBC-ENTMCNC: 31.3 GM/DL — LOW (ref 32–36)
MCHC RBC-ENTMCNC: 31.3 PG — SIGNIFICANT CHANGE UP (ref 27–34)
MCHC RBC-ENTMCNC: 31.6 PG — SIGNIFICANT CHANGE UP (ref 27–34)
MCHC RBC-ENTMCNC: 31.6 PG — SIGNIFICANT CHANGE UP (ref 27–34)
MCHC RBC-ENTMCNC: 31.8 PG — SIGNIFICANT CHANGE UP (ref 27–34)
MCHC RBC-ENTMCNC: 31.9 PG — SIGNIFICANT CHANGE UP (ref 27–34)
MCHC RBC-ENTMCNC: 31.9 PG — SIGNIFICANT CHANGE UP (ref 27–34)
MCHC RBC-ENTMCNC: 32 PG — SIGNIFICANT CHANGE UP (ref 27–34)
MCHC RBC-ENTMCNC: 32.5 GM/DL — SIGNIFICANT CHANGE UP (ref 32–36)
MCHC RBC-ENTMCNC: 32.5 PG — SIGNIFICANT CHANGE UP (ref 27–34)
MCHC RBC-ENTMCNC: 32.6 PG — SIGNIFICANT CHANGE UP (ref 27–34)
MCHC RBC-ENTMCNC: 32.7 GM/DL — SIGNIFICANT CHANGE UP (ref 32–36)
MCHC RBC-ENTMCNC: 32.7 GM/DL — SIGNIFICANT CHANGE UP (ref 32–36)
MCHC RBC-ENTMCNC: 32.9 GM/DL — SIGNIFICANT CHANGE UP (ref 32–36)
MCHC RBC-ENTMCNC: 33 GM/DL — SIGNIFICANT CHANGE UP (ref 32–36)
MCHC RBC-ENTMCNC: 33 GM/DL — SIGNIFICANT CHANGE UP (ref 32–36)
MCHC RBC-ENTMCNC: 33.2 GM/DL — SIGNIFICANT CHANGE UP (ref 32–36)
MCHC RBC-ENTMCNC: 33.3 GM/DL — SIGNIFICANT CHANGE UP (ref 32–36)
MCHC RBC-ENTMCNC: 33.3 GM/DL — SIGNIFICANT CHANGE UP (ref 32–36)
MCHC RBC-ENTMCNC: 33.5 GM/DL — SIGNIFICANT CHANGE UP (ref 32–36)
MCHC RBC-ENTMCNC: 33.5 GM/DL — SIGNIFICANT CHANGE UP (ref 32–36)
MCHC RBC-ENTMCNC: 33.7 GM/DL — SIGNIFICANT CHANGE UP (ref 32–36)
MCHC RBC-ENTMCNC: 33.8 GM/DL — SIGNIFICANT CHANGE UP (ref 32–36)
MCHC RBC-ENTMCNC: 34 GM/DL — SIGNIFICANT CHANGE UP (ref 32–36)
MCHC RBC-ENTMCNC: 34.1 GM/DL — SIGNIFICANT CHANGE UP (ref 32–36)
MCHC RBC-ENTMCNC: 34.1 GM/DL — SIGNIFICANT CHANGE UP (ref 32–36)
MCHC RBC-ENTMCNC: 34.5 GM/DL — SIGNIFICANT CHANGE UP (ref 32–36)
MCHC RBC-ENTMCNC: 34.5 GM/DL — SIGNIFICANT CHANGE UP (ref 32–36)
MCHC RBC-ENTMCNC: 34.8 GM/DL — SIGNIFICANT CHANGE UP (ref 32–36)
MCHC RBC-ENTMCNC: 34.8 GM/DL — SIGNIFICANT CHANGE UP (ref 32–36)
MCHC RBC-ENTMCNC: 36.3 GM/DL — HIGH (ref 32–36)
MCV RBC AUTO: 88.7 FL — SIGNIFICANT CHANGE UP (ref 80–100)
MCV RBC AUTO: 88.8 FL — SIGNIFICANT CHANGE UP (ref 80–100)
MCV RBC AUTO: 90 FL — SIGNIFICANT CHANGE UP (ref 80–100)
MCV RBC AUTO: 90.5 FL — SIGNIFICANT CHANGE UP (ref 80–100)
MCV RBC AUTO: 90.8 FL — SIGNIFICANT CHANGE UP (ref 80–100)
MCV RBC AUTO: 90.8 FL — SIGNIFICANT CHANGE UP (ref 80–100)
MCV RBC AUTO: 91 FL — SIGNIFICANT CHANGE UP (ref 80–100)
MCV RBC AUTO: 91.1 FL — SIGNIFICANT CHANGE UP (ref 80–100)
MCV RBC AUTO: 91.2 FL — SIGNIFICANT CHANGE UP (ref 80–100)
MCV RBC AUTO: 91.4 FL — SIGNIFICANT CHANGE UP (ref 80–100)
MCV RBC AUTO: 92.1 FL — SIGNIFICANT CHANGE UP (ref 80–100)
MCV RBC AUTO: 92.3 FL — SIGNIFICANT CHANGE UP (ref 80–100)
MCV RBC AUTO: 92.4 FL — SIGNIFICANT CHANGE UP (ref 80–100)
MCV RBC AUTO: 92.7 FL — SIGNIFICANT CHANGE UP (ref 80–100)
MCV RBC AUTO: 92.8 FL — SIGNIFICANT CHANGE UP (ref 80–100)
MCV RBC AUTO: 92.9 FL — SIGNIFICANT CHANGE UP (ref 80–100)
MCV RBC AUTO: 92.9 FL — SIGNIFICANT CHANGE UP (ref 80–100)
MCV RBC AUTO: 95.3 FL — SIGNIFICANT CHANGE UP (ref 80–100)
MCV RBC AUTO: 96.7 FL — SIGNIFICANT CHANGE UP (ref 80–100)
MCV RBC AUTO: 98.8 FL — SIGNIFICANT CHANGE UP (ref 80–100)
MCV RBC AUTO: 99.5 FL — SIGNIFICANT CHANGE UP (ref 80–100)
MONOCYTES # BLD AUTO: 0 K/UL — SIGNIFICANT CHANGE UP (ref 0–0.9)
MONOCYTES # BLD AUTO: 0.02 K/UL — SIGNIFICANT CHANGE UP (ref 0–0.9)
MONOCYTES # BLD AUTO: 0.07 K/UL — SIGNIFICANT CHANGE UP (ref 0–0.9)
MONOCYTES # BLD AUTO: 0.09 K/UL — SIGNIFICANT CHANGE UP (ref 0–0.9)
MONOCYTES # BLD AUTO: 0.13 K/UL — SIGNIFICANT CHANGE UP (ref 0–0.9)
MONOCYTES # BLD AUTO: 0.38 K/UL — SIGNIFICANT CHANGE UP (ref 0–0.9)
MONOCYTES # BLD AUTO: 124.54 K/UL — HIGH (ref 0–0.9)
MONOCYTES # BLD AUTO: 29.73 K/UL — HIGH (ref 0–0.9)
MONOCYTES # BLD AUTO: 37.65 K/UL — HIGH (ref 0–0.9)
MONOCYTES # BLD AUTO: 41.78 K/UL — HIGH (ref 0–0.9)
MONOCYTES # BLD AUTO: 6.55 K/UL — HIGH (ref 0–0.9)
MONOCYTES NFR BLD AUTO: 0 % — LOW (ref 2–14)
MONOCYTES NFR BLD AUTO: 1.8 % — LOW (ref 2–14)
MONOCYTES NFR BLD AUTO: 12 % — SIGNIFICANT CHANGE UP (ref 2–14)
MONOCYTES NFR BLD AUTO: 16.1 % — HIGH (ref 2–14)
MONOCYTES NFR BLD AUTO: 16.7 % — HIGH (ref 2–14)
MONOCYTES NFR BLD AUTO: 4.3 % — SIGNIFICANT CHANGE UP (ref 2–14)
MONOCYTES NFR BLD AUTO: 54.2 % — HIGH (ref 2–14)
MONOCYTES NFR BLD AUTO: 62.4 % — HIGH (ref 2–14)
MONOCYTES NFR BLD AUTO: 63 % — HIGH (ref 2–14)
MONOCYTES NFR BLD AUTO: 72.8 % — HIGH (ref 2–14)
MONOCYTES NFR BLD AUTO: 8.9 % — SIGNIFICANT CHANGE UP (ref 2–14)
MRSA PCR RESULT.: SIGNIFICANT CHANGE UP
MYE REFERENCES 2: SIGNIFICANT CHANGE UP
MYELOCYTES NFR BLD: 1 % — HIGH (ref 0–0)
NEUTROPHILS # BLD AUTO: 0.02 K/UL — LOW (ref 1.8–7.4)
NEUTROPHILS # BLD AUTO: 0.08 K/UL — LOW (ref 1.8–7.4)
NEUTROPHILS # BLD AUTO: 0.1 K/UL — LOW (ref 1.8–7.4)
NEUTROPHILS # BLD AUTO: 0.22 K/UL — LOW (ref 1.8–7.4)
NEUTROPHILS # BLD AUTO: 0.77 K/UL — LOW (ref 1.8–7.4)
NEUTROPHILS # BLD AUTO: 1.96 K/UL — SIGNIFICANT CHANGE UP (ref 1.8–7.4)
NEUTROPHILS # BLD AUTO: 13.04 K/UL — HIGH (ref 1.8–7.4)
NEUTROPHILS # BLD AUTO: 14.31 K/UL — HIGH (ref 1.8–7.4)
NEUTROPHILS # BLD AUTO: 14.88 K/UL — HIGH (ref 1.8–7.4)
NEUTROPHILS # BLD AUTO: 21.97 K/UL — HIGH (ref 1.8–7.4)
NEUTROPHILS # BLD AUTO: 6.94 K/UL — SIGNIFICANT CHANGE UP (ref 1.8–7.4)
NEUTROPHILS NFR BLD AUTO: 10.5 % — LOW (ref 43–77)
NEUTROPHILS NFR BLD AUTO: 12.8 % — LOW (ref 43–77)
NEUTROPHILS NFR BLD AUTO: 21.5 % — LOW (ref 43–77)
NEUTROPHILS NFR BLD AUTO: 23.8 % — LOW (ref 43–77)
NEUTROPHILS NFR BLD AUTO: 24.7 % — LOW (ref 43–77)
NEUTROPHILS NFR BLD AUTO: 26.8 % — LOW (ref 43–77)
NEUTROPHILS NFR BLD AUTO: 3 % — LOW (ref 43–77)
NEUTROPHILS NFR BLD AUTO: 4 % — LOW (ref 43–77)
NEUTROPHILS NFR BLD AUTO: 5 % — LOW (ref 43–77)
NEUTROPHILS NFR BLD AUTO: 7.5 % — LOW (ref 43–77)
NEUTROPHILS NFR BLD AUTO: 8.6 % — LOW (ref 43–77)
NRBC # BLD: 0 /100 WBCS — SIGNIFICANT CHANGE UP
NRBC # BLD: 0 /100 WBCS — SIGNIFICANT CHANGE UP (ref 0–0)
NRBC # BLD: 1 /100 — HIGH (ref 0–0)
NRBC # BLD: 2 /100 WBCS — HIGH (ref 0–0)
NRBC # BLD: 4 /100 WBCS — HIGH (ref 0–0)
NRBC # BLD: 4 /100 WBCS — HIGH (ref 0–0)
NRBC # BLD: 6 /100 WBCS — HIGH (ref 0–0)
NRBC # FLD: 0.2 K/UL — HIGH
NRBC # FLD: 0.27 K/UL — HIGH
NRBC # FLD: 0.3 K/UL — HIGH
NRBC # FLD: 0.32 K/UL — HIGH
NRBC # FLD: 0.47 K/UL — HIGH
NT-PROBNP SERPL-SCNC: 3277 PG/ML — HIGH
NT-PROBNP SERPL-SCNC: 5109 PG/ML — HIGH (ref 0–300)
ONKOSIGHT MYELOID SEQUENCE: (no result)
PCO2 BLDV: 37 MMHG — LOW (ref 39–42)
PCO2 BLDV: 40 MMHG — SIGNIFICANT CHANGE UP (ref 39–42)
PH BLDV: 7.31 — LOW (ref 7.32–7.43)
PH BLDV: 7.44 — HIGH (ref 7.32–7.43)
PHOSPHATE SERPL-MCNC: 1.5 MG/DL — LOW (ref 2.5–4.5)
PHOSPHATE SERPL-MCNC: 2 MG/DL — LOW (ref 2.5–4.5)
PHOSPHATE SERPL-MCNC: 2.4 MG/DL — LOW (ref 2.5–4.5)
PHOSPHATE SERPL-MCNC: 2.6 MG/DL — SIGNIFICANT CHANGE UP (ref 2.5–4.5)
PHOSPHATE SERPL-MCNC: 2.8 MG/DL — SIGNIFICANT CHANGE UP (ref 2.5–4.5)
PHOSPHATE SERPL-MCNC: 2.9 MG/DL — SIGNIFICANT CHANGE UP (ref 2.5–4.5)
PHOSPHATE SERPL-MCNC: 3 MG/DL — SIGNIFICANT CHANGE UP (ref 2.5–4.5)
PHOSPHATE SERPL-MCNC: 3.2 MG/DL — SIGNIFICANT CHANGE UP (ref 2.5–4.5)
PHOSPHATE SERPL-MCNC: 3.8 MG/DL — SIGNIFICANT CHANGE UP (ref 2.5–4.5)
PHOSPHATE SERPL-MCNC: 4.4 MG/DL — SIGNIFICANT CHANGE UP (ref 2.5–4.5)
PHOSPHATE SERPL-MCNC: 4.6 MG/DL — HIGH (ref 2.5–4.5)
PHOSPHATE SERPL-MCNC: 4.6 MG/DL — HIGH (ref 2.5–4.5)
PHOSPHATE SERPL-MCNC: 5.3 MG/DL — HIGH (ref 2.5–4.5)
PHOSPHATE SERPL-MCNC: 5.9 MG/DL — HIGH (ref 2.5–4.5)
PHOSPHATE SERPL-MCNC: 6.8 MG/DL — HIGH (ref 2.5–4.5)
PHOSPHATE SERPL-MCNC: 7.3 MG/DL — HIGH (ref 2.5–4.5)
PHOSPHATE SERPL-MCNC: 8.7 MG/DL — HIGH (ref 2.5–4.5)
PLAT MORPH BLD: ABNORMAL
PLATELET # BLD AUTO: 27 K/UL — LOW (ref 150–400)
PLATELET # BLD AUTO: 29 K/UL — LOW (ref 150–400)
PLATELET # BLD AUTO: 33 K/UL — LOW (ref 150–400)
PLATELET # BLD AUTO: 38 K/UL — LOW (ref 150–400)
PLATELET # BLD AUTO: 40 K/UL — LOW (ref 150–400)
PLATELET # BLD AUTO: 40 K/UL — LOW (ref 150–400)
PLATELET # BLD AUTO: 42 K/UL — LOW (ref 150–400)
PLATELET # BLD AUTO: 42 K/UL — LOW (ref 150–400)
PLATELET # BLD AUTO: 43 K/UL — LOW (ref 150–400)
PLATELET # BLD AUTO: 45 K/UL — LOW (ref 150–400)
PLATELET # BLD AUTO: 46 K/UL — LOW (ref 150–400)
PLATELET # BLD AUTO: 47 K/UL — LOW (ref 150–400)
PLATELET # BLD AUTO: 48 K/UL — LOW (ref 150–400)
PLATELET # BLD AUTO: 50 K/UL — LOW (ref 150–400)
PLATELET # BLD AUTO: 59 K/UL — LOW (ref 150–400)
PLATELET # BLD AUTO: 59 K/UL — LOW (ref 150–400)
PLATELET # BLD AUTO: 60 K/UL — LOW (ref 150–400)
PLATELET # BLD AUTO: 60 K/UL — LOW (ref 150–400)
PLATELET # BLD AUTO: 66 K/UL — LOW (ref 150–400)
PLATELET # BLD AUTO: 68 K/UL — LOW (ref 150–400)
PLATELET # BLD AUTO: 70 K/UL — LOW (ref 150–400)
PLATELET # BLD AUTO: 70 K/UL — LOW (ref 150–400)
PLATELET # BLD AUTO: 71 K/UL — LOW (ref 150–400)
PLATELET # BLD AUTO: 73 K/UL — LOW (ref 150–400)
PLATELET # BLD AUTO: 85 K/UL — LOW (ref 150–400)
PLATELET # BLD AUTO: 86 K/UL — LOW (ref 150–400)
PLATELET # BLD AUTO: 95 K/UL — LOW (ref 150–400)
PLATELET # BLD AUTO: 98 K/UL — LOW (ref 150–400)
PLATELET COUNT - ESTIMATE: ABNORMAL
PO2 BLDV: 31 MMHG — SIGNIFICANT CHANGE UP (ref 25–45)
PO2 BLDV: 36 MMHG — SIGNIFICANT CHANGE UP
POTASSIUM BLDV-SCNC: 2.2 MMOL/L — CRITICAL LOW (ref 3.5–5.1)
POTASSIUM BLDV-SCNC: 3.7 MMOL/L — SIGNIFICANT CHANGE UP (ref 3.5–5.1)
POTASSIUM SERPL-MCNC: 2.4 MMOL/L — CRITICAL LOW (ref 3.5–5.3)
POTASSIUM SERPL-MCNC: 3 MMOL/L — LOW (ref 3.5–5.3)
POTASSIUM SERPL-MCNC: 3.2 MMOL/L — LOW (ref 3.5–5.3)
POTASSIUM SERPL-MCNC: 3.4 MMOL/L — LOW (ref 3.5–5.3)
POTASSIUM SERPL-MCNC: 3.5 MMOL/L — SIGNIFICANT CHANGE UP (ref 3.5–5.3)
POTASSIUM SERPL-MCNC: 3.7 MMOL/L — SIGNIFICANT CHANGE UP (ref 3.5–5.3)
POTASSIUM SERPL-MCNC: 4 MMOL/L — SIGNIFICANT CHANGE UP (ref 3.5–5.3)
POTASSIUM SERPL-MCNC: 4.2 MMOL/L — SIGNIFICANT CHANGE UP (ref 3.5–5.3)
POTASSIUM SERPL-MCNC: 4.2 MMOL/L — SIGNIFICANT CHANGE UP (ref 3.5–5.3)
POTASSIUM SERPL-MCNC: 4.4 MMOL/L — SIGNIFICANT CHANGE UP (ref 3.5–5.3)
POTASSIUM SERPL-MCNC: 4.6 MMOL/L — SIGNIFICANT CHANGE UP (ref 3.5–5.3)
POTASSIUM SERPL-MCNC: 4.8 MMOL/L — SIGNIFICANT CHANGE UP (ref 3.5–5.3)
POTASSIUM SERPL-SCNC: 2.4 MMOL/L — CRITICAL LOW (ref 3.5–5.3)
POTASSIUM SERPL-SCNC: 3 MMOL/L — LOW (ref 3.5–5.3)
POTASSIUM SERPL-SCNC: 3.2 MMOL/L — LOW (ref 3.5–5.3)
POTASSIUM SERPL-SCNC: 3.4 MMOL/L — LOW (ref 3.5–5.3)
POTASSIUM SERPL-SCNC: 3.5 MMOL/L — SIGNIFICANT CHANGE UP (ref 3.5–5.3)
POTASSIUM SERPL-SCNC: 3.7 MMOL/L — SIGNIFICANT CHANGE UP (ref 3.5–5.3)
POTASSIUM SERPL-SCNC: 4 MMOL/L — SIGNIFICANT CHANGE UP (ref 3.5–5.3)
POTASSIUM SERPL-SCNC: 4.2 MMOL/L — SIGNIFICANT CHANGE UP (ref 3.5–5.3)
POTASSIUM SERPL-SCNC: 4.2 MMOL/L — SIGNIFICANT CHANGE UP (ref 3.5–5.3)
POTASSIUM SERPL-SCNC: 4.4 MMOL/L — SIGNIFICANT CHANGE UP (ref 3.5–5.3)
POTASSIUM SERPL-SCNC: 4.6 MMOL/L — SIGNIFICANT CHANGE UP (ref 3.5–5.3)
POTASSIUM SERPL-SCNC: 4.8 MMOL/L — SIGNIFICANT CHANGE UP (ref 3.5–5.3)
PROCALCITONIN SERPL-MCNC: 0.21 NG/ML — HIGH (ref 0.02–0.1)
PROT SERPL-MCNC: 4.5 G/DL — LOW (ref 6–8.3)
PROT SERPL-MCNC: 5.3 G/DL — LOW (ref 6–8.3)
PROT SERPL-MCNC: 5.5 G/DL — LOW (ref 6–8.3)
PROT SERPL-MCNC: 5.5 G/DL — LOW (ref 6–8.3)
PROT SERPL-MCNC: 5.7 G/DL — LOW (ref 6–8.3)
PROT SERPL-MCNC: 5.9 G/DL — LOW (ref 6–8.3)
PROT SERPL-MCNC: 6 G/DL — SIGNIFICANT CHANGE UP (ref 6–8.3)
PROT SERPL-MCNC: 6 G/DL — SIGNIFICANT CHANGE UP (ref 6–8.3)
PROT SERPL-MCNC: 6.1 G/DL — SIGNIFICANT CHANGE UP (ref 6–8.3)
PROT SERPL-MCNC: 6.1 G/DL — SIGNIFICANT CHANGE UP (ref 6–8.3)
PROT SERPL-MCNC: 6.4 G/DL — SIGNIFICANT CHANGE UP (ref 6–8.3)
PROT SERPL-MCNC: 6.4 G/DL — SIGNIFICANT CHANGE UP (ref 6–8.3)
PROT SERPL-MCNC: 6.5 G/DL — SIGNIFICANT CHANGE UP (ref 6–8.3)
PROT SERPL-MCNC: 6.5 G/DL — SIGNIFICANT CHANGE UP (ref 6–8.3)
PROT SERPL-MCNC: 6.8 G/DL — SIGNIFICANT CHANGE UP (ref 6–8.3)
PROT SERPL-MCNC: 7.3 G/DL — SIGNIFICANT CHANGE UP (ref 6–8.3)
PROTHROM AB SERPL-ACNC: 15.3 SEC — HIGH (ref 10.5–13.4)
PROTHROM AB SERPL-ACNC: 15.8 SEC — HIGH (ref 10.5–13.4)
PROTHROM AB SERPL-ACNC: 17.2 SEC — HIGH (ref 10.5–13.4)
PROTHROM AB SERPL-ACNC: 18.7 SEC — HIGH (ref 10.5–13.4)
PROTHROM AB SERPL-ACNC: 18.8 SEC — HIGH (ref 10.5–13.4)
PROTHROM AB SERPL-ACNC: 20.9 SEC — HIGH (ref 10.5–13.4)
PROTHROM AB SERPL-ACNC: 23 SEC — HIGH (ref 10.5–13.4)
RAPID RVP RESULT: SIGNIFICANT CHANGE UP
RBC # BLD: 2.09 M/UL — LOW (ref 3.8–5.2)
RBC # BLD: 2.13 M/UL — LOW (ref 3.8–5.2)
RBC # BLD: 2.2 M/UL — LOW (ref 3.8–5.2)
RBC # BLD: 2.25 M/UL — LOW (ref 3.8–5.2)
RBC # BLD: 2.25 M/UL — LOW (ref 3.8–5.2)
RBC # BLD: 2.28 M/UL — LOW (ref 3.8–5.2)
RBC # BLD: 2.33 M/UL — LOW (ref 3.8–5.2)
RBC # BLD: 2.36 M/UL — LOW (ref 3.8–5.2)
RBC # BLD: 2.38 M/UL — LOW (ref 3.8–5.2)
RBC # BLD: 2.39 M/UL — LOW (ref 3.8–5.2)
RBC # BLD: 2.39 M/UL — LOW (ref 3.8–5.2)
RBC # BLD: 2.47 M/UL — LOW (ref 3.8–5.2)
RBC # BLD: 2.47 M/UL — LOW (ref 3.8–5.2)
RBC # BLD: 2.49 M/UL — LOW (ref 3.8–5.2)
RBC # BLD: 2.5 M/UL — LOW (ref 3.8–5.2)
RBC # BLD: 2.5 M/UL — LOW (ref 3.8–5.2)
RBC # BLD: 2.51 M/UL — LOW (ref 3.8–5.2)
RBC # BLD: 2.55 M/UL — LOW (ref 3.8–5.2)
RBC # BLD: 2.61 M/UL — LOW (ref 3.8–5.2)
RBC # BLD: 2.61 M/UL — LOW (ref 3.8–5.2)
RBC # BLD: 2.62 M/UL — LOW (ref 3.8–5.2)
RBC # BLD: 2.9 M/UL — LOW (ref 3.8–5.2)
RBC # BLD: 3.07 M/UL — LOW (ref 3.8–5.2)
RBC # FLD: 17.3 % — HIGH (ref 10.3–14.5)
RBC # FLD: 17.3 % — HIGH (ref 10.3–14.5)
RBC # FLD: 17.4 % — HIGH (ref 10.3–14.5)
RBC # FLD: 17.8 % — HIGH (ref 10.3–14.5)
RBC # FLD: 17.9 % — HIGH (ref 10.3–14.5)
RBC # FLD: 17.9 % — HIGH (ref 10.3–14.5)
RBC # FLD: 18.7 % — HIGH (ref 10.3–14.5)
RBC # FLD: 19.2 % — HIGH (ref 10.3–14.5)
RBC # FLD: 19.4 % — HIGH (ref 10.3–14.5)
RBC # FLD: 19.6 % — HIGH (ref 10.3–14.5)
RBC # FLD: 19.7 % — HIGH (ref 10.3–14.5)
RBC # FLD: 19.8 % — HIGH (ref 10.3–14.5)
RBC # FLD: 20 % — HIGH (ref 10.3–14.5)
RBC # FLD: 20.2 % — HIGH (ref 10.3–14.5)
RBC # FLD: 20.2 % — HIGH (ref 10.3–14.5)
RBC # FLD: 20.3 % — HIGH (ref 10.3–14.5)
RBC # FLD: 20.5 % — HIGH (ref 10.3–14.5)
RBC # FLD: 20.6 % — HIGH (ref 10.3–14.5)
RBC # FLD: 20.7 % — HIGH (ref 10.3–14.5)
RBC # FLD: 20.7 % — HIGH (ref 10.3–14.5)
RBC # FLD: 20.8 % — HIGH (ref 10.3–14.5)
RBC # FLD: 22.2 % — HIGH (ref 10.3–14.5)
RBC # FLD: 22.5 % — HIGH (ref 10.3–14.5)
RBC BLD AUTO: ABNORMAL
RH IG SCN BLD-IMP: POSITIVE — SIGNIFICANT CHANGE UP
RSV RNA SPEC QL NAA+PROBE: SIGNIFICANT CHANGE UP
RV+EV RNA SPEC QL NAA+PROBE: SIGNIFICANT CHANGE UP
S AUREUS DNA NOSE QL NAA+PROBE: SIGNIFICANT CHANGE UP
SAO2 % BLDV: 51.3 % — LOW (ref 67–88)
SAO2 % BLDV: 58.8 % — SIGNIFICANT CHANGE UP
SARS-COV-2 RNA SPEC QL NAA+PROBE: SIGNIFICANT CHANGE UP
SARS-COV-2 RNA SPEC QL NAA+PROBE: SIGNIFICANT CHANGE UP
SMUDGE CELLS # BLD: PRESENT — SIGNIFICANT CHANGE UP
SODIUM SERPL-SCNC: 121 MMOL/L — LOW (ref 135–145)
SODIUM SERPL-SCNC: 136 MMOL/L — SIGNIFICANT CHANGE UP (ref 135–145)
SODIUM SERPL-SCNC: 137 MMOL/L — SIGNIFICANT CHANGE UP (ref 135–145)
SODIUM SERPL-SCNC: 137 MMOL/L — SIGNIFICANT CHANGE UP (ref 135–145)
SODIUM SERPL-SCNC: 138 MMOL/L — SIGNIFICANT CHANGE UP (ref 135–145)
SODIUM SERPL-SCNC: 139 MMOL/L — SIGNIFICANT CHANGE UP (ref 135–145)
SODIUM SERPL-SCNC: 140 MMOL/L — SIGNIFICANT CHANGE UP (ref 135–145)
SODIUM SERPL-SCNC: 140 MMOL/L — SIGNIFICANT CHANGE UP (ref 135–145)
SODIUM SERPL-SCNC: 141 MMOL/L — SIGNIFICANT CHANGE UP (ref 135–145)
SODIUM SERPL-SCNC: 145 MMOL/L — SIGNIFICANT CHANGE UP (ref 135–145)
SPECIMEN SOURCE: SIGNIFICANT CHANGE UP
SPECIMEN SOURCE: SIGNIFICANT CHANGE UP
TM INTERPRETATION: SIGNIFICANT CHANGE UP
TM INTERPRETATION: SIGNIFICANT CHANGE UP
TROPONIN T, HIGH SENSITIVITY RESULT: 18 NG/L — SIGNIFICANT CHANGE UP
TROPONIN T, HIGH SENSITIVITY RESULT: 21 NG/L — SIGNIFICANT CHANGE UP
TROPONIN T, HIGH SENSITIVITY RESULT: 21 NG/L — SIGNIFICANT CHANGE UP
TROPONIN T, HIGH SENSITIVITY RESULT: 22 NG/L — SIGNIFICANT CHANGE UP
TROPONIN T, HIGH SENSITIVITY RESULT: 27 NG/L — SIGNIFICANT CHANGE UP
URATE SERPL-MCNC: 12.1 MG/DL — HIGH (ref 2.5–7)
URATE SERPL-MCNC: 2.1 MG/DL — LOW (ref 2.5–7)
URATE SERPL-MCNC: 2.2 MG/DL — LOW (ref 2.5–7)
URATE SERPL-MCNC: 2.3 MG/DL — LOW (ref 2.5–7)
URATE SERPL-MCNC: 2.7 MG/DL — SIGNIFICANT CHANGE UP (ref 2.5–7)
URATE SERPL-MCNC: 2.8 MG/DL — SIGNIFICANT CHANGE UP (ref 2.5–7)
URATE SERPL-MCNC: 2.9 MG/DL — SIGNIFICANT CHANGE UP (ref 2.5–7)
URATE SERPL-MCNC: 3 MG/DL — SIGNIFICANT CHANGE UP (ref 2.5–7)
URATE SERPL-MCNC: 3 MG/DL — SIGNIFICANT CHANGE UP (ref 2.5–7)
URATE SERPL-MCNC: 3.1 MG/DL — SIGNIFICANT CHANGE UP (ref 2.5–7)
URATE SERPL-MCNC: 3.4 MG/DL — SIGNIFICANT CHANGE UP (ref 2.5–7)
URATE SERPL-MCNC: 3.7 MG/DL — SIGNIFICANT CHANGE UP (ref 2.5–7)
URATE SERPL-MCNC: 3.8 MG/DL — SIGNIFICANT CHANGE UP (ref 2.5–7)
URATE SERPL-MCNC: 4.1 MG/DL — SIGNIFICANT CHANGE UP (ref 2.5–7)
URATE SERPL-MCNC: 4.3 MG/DL — SIGNIFICANT CHANGE UP (ref 2.5–7)
URATE SERPL-MCNC: 6.4 MG/DL — SIGNIFICANT CHANGE UP (ref 2.5–7)
URATE SERPL-MCNC: 8.9 MG/DL — HIGH (ref 2.5–7)
WBC # BLD: 0.48 K/UL — CRITICAL LOW (ref 3.8–10.5)
WBC # BLD: 0.56 K/UL — CRITICAL LOW (ref 3.8–10.5)
WBC # BLD: 0.59 K/UL — CRITICAL LOW (ref 3.8–10.5)
WBC # BLD: 0.62 K/UL — CRITICAL LOW (ref 3.8–10.5)
WBC # BLD: 0.65 K/UL — CRITICAL LOW (ref 3.8–10.5)
WBC # BLD: 0.82 K/UL — CRITICAL LOW (ref 3.8–10.5)
WBC # BLD: 0.82 K/UL — CRITICAL LOW (ref 3.8–10.5)
WBC # BLD: 0.87 K/UL — CRITICAL LOW (ref 3.8–10.5)
WBC # BLD: 0.91 K/UL — CRITICAL LOW (ref 3.8–10.5)
WBC # BLD: 0.91 K/UL — CRITICAL LOW (ref 3.8–10.5)
WBC # BLD: 0.97 K/UL — CRITICAL LOW (ref 3.8–10.5)
WBC # BLD: 1.01 K/UL — CRITICAL LOW (ref 3.8–10.5)
WBC # BLD: 1.04 K/UL — CRITICAL LOW (ref 3.8–10.5)
WBC # BLD: 1.41 K/UL — LOW (ref 3.8–10.5)
WBC # BLD: 171.15 K/UL — CRITICAL HIGH (ref 3.8–10.5)
WBC # BLD: 19.83 K/UL — HIGH (ref 3.8–10.5)
WBC # BLD: 231.3 K/UL — CRITICAL HIGH (ref 3.8–10.5)
WBC # BLD: 39.21 K/UL — HIGH (ref 3.8–10.5)
WBC # BLD: 54.84 K/UL — CRITICAL HIGH (ref 3.8–10.5)
WBC # BLD: 59.81 K/UL — CRITICAL HIGH (ref 3.8–10.5)
WBC # BLD: 60.31 K/UL — CRITICAL HIGH (ref 3.8–10.5)
WBC # BLD: 66.35 K/UL — CRITICAL HIGH (ref 3.8–10.5)
WBC # BLD: 8.93 K/UL — SIGNIFICANT CHANGE UP (ref 3.8–10.5)
WBC # FLD AUTO: 0.48 K/UL — CRITICAL LOW (ref 3.8–10.5)
WBC # FLD AUTO: 0.56 K/UL — CRITICAL LOW (ref 3.8–10.5)
WBC # FLD AUTO: 0.59 K/UL — CRITICAL LOW (ref 3.8–10.5)
WBC # FLD AUTO: 0.62 K/UL — CRITICAL LOW (ref 3.8–10.5)
WBC # FLD AUTO: 0.65 K/UL — CRITICAL LOW (ref 3.8–10.5)
WBC # FLD AUTO: 0.82 K/UL — CRITICAL LOW (ref 3.8–10.5)
WBC # FLD AUTO: 0.82 K/UL — CRITICAL LOW (ref 3.8–10.5)
WBC # FLD AUTO: 0.87 K/UL — CRITICAL LOW (ref 3.8–10.5)
WBC # FLD AUTO: 0.91 K/UL — CRITICAL LOW (ref 3.8–10.5)
WBC # FLD AUTO: 0.91 K/UL — CRITICAL LOW (ref 3.8–10.5)
WBC # FLD AUTO: 0.97 K/UL — CRITICAL LOW (ref 3.8–10.5)
WBC # FLD AUTO: 1.01 K/UL — CRITICAL LOW (ref 3.8–10.5)
WBC # FLD AUTO: 1.04 K/UL — CRITICAL LOW (ref 3.8–10.5)
WBC # FLD AUTO: 1.41 K/UL — LOW (ref 3.8–10.5)
WBC # FLD AUTO: 171.15 K/UL — CRITICAL HIGH (ref 3.8–10.5)
WBC # FLD AUTO: 19.83 K/UL — HIGH (ref 3.8–10.5)
WBC # FLD AUTO: 231.3 K/UL — CRITICAL HIGH (ref 3.8–10.5)
WBC # FLD AUTO: 39.21 K/UL — HIGH (ref 3.8–10.5)
WBC # FLD AUTO: 54.84 K/UL — CRITICAL HIGH (ref 3.8–10.5)
WBC # FLD AUTO: 59.81 K/UL — CRITICAL HIGH (ref 3.8–10.5)
WBC # FLD AUTO: 60.31 K/UL — CRITICAL HIGH (ref 3.8–10.5)
WBC # FLD AUTO: 66.35 K/UL — CRITICAL HIGH (ref 3.8–10.5)
WBC # FLD AUTO: 8.93 K/UL — SIGNIFICANT CHANGE UP (ref 3.8–10.5)

## 2022-01-01 PROCEDURE — 99291 CRITICAL CARE FIRST HOUR: CPT

## 2022-01-01 PROCEDURE — 82947 ASSAY GLUCOSE BLOOD QUANT: CPT

## 2022-01-01 PROCEDURE — 83605 ASSAY OF LACTIC ACID: CPT

## 2022-01-01 PROCEDURE — 99223 1ST HOSP IP/OBS HIGH 75: CPT

## 2022-01-01 PROCEDURE — 84550 ASSAY OF BLOOD/URIC ACID: CPT

## 2022-01-01 PROCEDURE — 88189 FLOWCYTOMETRY/READ 16 & >: CPT

## 2022-01-01 PROCEDURE — P9037: CPT

## 2022-01-01 PROCEDURE — 76937 US GUIDE VASCULAR ACCESS: CPT | Mod: 26,59

## 2022-01-01 PROCEDURE — 99233 SBSQ HOSP IP/OBS HIGH 50: CPT

## 2022-01-01 PROCEDURE — 99233 SBSQ HOSP IP/OBS HIGH 50: CPT | Mod: GC

## 2022-01-01 PROCEDURE — 82962 GLUCOSE BLOOD TEST: CPT

## 2022-01-01 PROCEDURE — 99497 ADVNCD CARE PLAN 30 MIN: CPT | Mod: 25

## 2022-01-01 PROCEDURE — 86850 RBC ANTIBODY SCREEN: CPT

## 2022-01-01 PROCEDURE — 71045 X-RAY EXAM CHEST 1 VIEW: CPT | Mod: 26

## 2022-01-01 PROCEDURE — 83880 ASSAY OF NATRIURETIC PEPTIDE: CPT

## 2022-01-01 PROCEDURE — 84132 ASSAY OF SERUM POTASSIUM: CPT

## 2022-01-01 PROCEDURE — P9100: CPT

## 2022-01-01 PROCEDURE — 36511 APHERESIS WBC: CPT

## 2022-01-01 PROCEDURE — 93005 ELECTROCARDIOGRAM TRACING: CPT

## 2022-01-01 PROCEDURE — 85060 BLOOD SMEAR INTERPRETATION: CPT

## 2022-01-01 PROCEDURE — 99221 1ST HOSP IP/OBS SF/LOW 40: CPT | Mod: 25

## 2022-01-01 PROCEDURE — 84295 ASSAY OF SERUM SODIUM: CPT

## 2022-01-01 PROCEDURE — 88341 IMHCHEM/IMCYTCHM EA ADD ANTB: CPT | Mod: 26,59

## 2022-01-01 PROCEDURE — 71275 CT ANGIOGRAPHY CHEST: CPT | Mod: 26

## 2022-01-01 PROCEDURE — 85049 AUTOMATED PLATELET COUNT: CPT

## 2022-01-01 PROCEDURE — 94002 VENT MGMT INPAT INIT DAY: CPT

## 2022-01-01 PROCEDURE — P9040: CPT

## 2022-01-01 PROCEDURE — 93970 EXTREMITY STUDY: CPT

## 2022-01-01 PROCEDURE — 82330 ASSAY OF CALCIUM: CPT

## 2022-01-01 PROCEDURE — 36800 INSERTION OF CANNULA: CPT

## 2022-01-01 PROCEDURE — 99232 SBSQ HOSP IP/OBS MODERATE 35: CPT

## 2022-01-01 PROCEDURE — 99239 HOSP IP/OBS DSCHRG MGMT >30: CPT

## 2022-01-01 PROCEDURE — P9011: CPT

## 2022-01-01 PROCEDURE — 88342 IMHCHEM/IMCYTCHM 1ST ANTB: CPT | Mod: 26,59

## 2022-01-01 PROCEDURE — 99291 CRITICAL CARE FIRST HOUR: CPT | Mod: 25

## 2022-01-01 PROCEDURE — 36569 INSJ PICC 5 YR+ W/O IMAGING: CPT

## 2022-01-01 PROCEDURE — 36415 COLL VENOUS BLD VENIPUNCTURE: CPT

## 2022-01-01 PROCEDURE — 94660 CPAP INITIATION&MGMT: CPT

## 2022-01-01 PROCEDURE — 83615 LACTATE (LD) (LDH) ENZYME: CPT

## 2022-01-01 PROCEDURE — C1751: CPT

## 2022-01-01 PROCEDURE — 86901 BLOOD TYPING SEROLOGIC RH(D): CPT

## 2022-01-01 PROCEDURE — 85730 THROMBOPLASTIN TIME PARTIAL: CPT

## 2022-01-01 PROCEDURE — 82565 ASSAY OF CREATININE: CPT

## 2022-01-01 PROCEDURE — 93306 TTE W/DOPPLER COMPLETE: CPT | Mod: 26

## 2022-01-01 PROCEDURE — 85014 HEMATOCRIT: CPT

## 2022-01-01 PROCEDURE — 71045 X-RAY EXAM CHEST 1 VIEW: CPT | Mod: 26,76

## 2022-01-01 PROCEDURE — 85097 BONE MARROW INTERPRETATION: CPT

## 2022-01-01 PROCEDURE — 86900 BLOOD TYPING SEROLOGIC ABO: CPT

## 2022-01-01 PROCEDURE — 32551 INSERTION OF CHEST TUBE: CPT | Mod: RT

## 2022-01-01 PROCEDURE — 84145 PROCALCITONIN (PCT): CPT

## 2022-01-01 PROCEDURE — 80053 COMPREHEN METABOLIC PANEL: CPT

## 2022-01-01 PROCEDURE — 93010 ELECTROCARDIOGRAM REPORT: CPT

## 2022-01-01 PROCEDURE — 71045 X-RAY EXAM CHEST 1 VIEW: CPT

## 2022-01-01 PROCEDURE — 88291 CYTO/MOLECULAR REPORT: CPT | Mod: 59

## 2022-01-01 PROCEDURE — 99223 1ST HOSP IP/OBS HIGH 75: CPT | Mod: GC

## 2022-01-01 PROCEDURE — 88313 SPECIAL STAINS GROUP 2: CPT | Mod: 26

## 2022-01-01 PROCEDURE — 74018 RADEX ABDOMEN 1 VIEW: CPT | Mod: 26

## 2022-01-01 PROCEDURE — 99292 CRITICAL CARE ADDL 30 MIN: CPT | Mod: 25

## 2022-01-01 PROCEDURE — 85018 HEMOGLOBIN: CPT

## 2022-01-01 PROCEDURE — 97161 PT EVAL LOW COMPLEX 20 MIN: CPT

## 2022-01-01 PROCEDURE — 94640 AIRWAY INHALATION TREATMENT: CPT

## 2022-01-01 PROCEDURE — 88360 TUMOR IMMUNOHISTOCHEM/MANUAL: CPT | Mod: 26

## 2022-01-01 PROCEDURE — G0452: CPT | Mod: 26

## 2022-01-01 PROCEDURE — 93970 EXTREMITY STUDY: CPT | Mod: 26

## 2022-01-01 PROCEDURE — 85610 PROTHROMBIN TIME: CPT

## 2022-01-01 PROCEDURE — 36430 TRANSFUSION BLD/BLD COMPNT: CPT

## 2022-01-01 PROCEDURE — 82803 BLOOD GASES ANY COMBINATION: CPT

## 2022-01-01 PROCEDURE — 86985 SPLIT BLOOD OR PRODUCTS: CPT

## 2022-01-01 PROCEDURE — 85384 FIBRINOGEN ACTIVITY: CPT

## 2022-01-01 PROCEDURE — 83735 ASSAY OF MAGNESIUM: CPT

## 2022-01-01 PROCEDURE — 82435 ASSAY OF BLOOD CHLORIDE: CPT

## 2022-01-01 PROCEDURE — 85027 COMPLETE CBC AUTOMATED: CPT

## 2022-01-01 PROCEDURE — 86923 COMPATIBILITY TEST ELECTRIC: CPT

## 2022-01-01 PROCEDURE — 85025 COMPLETE CBC W/AUTO DIFF WBC: CPT

## 2022-01-01 PROCEDURE — 87040 BLOOD CULTURE FOR BACTERIA: CPT

## 2022-01-01 PROCEDURE — 74018 RADEX ABDOMEN 1 VIEW: CPT

## 2022-01-01 PROCEDURE — 84100 ASSAY OF PHOSPHORUS: CPT

## 2022-01-01 PROCEDURE — 82248 BILIRUBIN DIRECT: CPT

## 2022-01-01 RX ORDER — SODIUM CHLORIDE 9 MG/ML
1000 INJECTION, SOLUTION INTRAVENOUS
Refills: 0 | Status: DISCONTINUED | OUTPATIENT
Start: 2022-01-01 | End: 2022-01-01

## 2022-01-01 RX ORDER — DEXTROSE 50 % IN WATER 50 %
15 SYRINGE (ML) INTRAVENOUS ONCE
Refills: 0 | Status: COMPLETED | OUTPATIENT
Start: 2022-01-01 | End: 2022-01-01

## 2022-01-01 RX ORDER — INSULIN LISPRO 100/ML
VIAL (ML) SUBCUTANEOUS AT BEDTIME
Refills: 0 | Status: DISCONTINUED | OUTPATIENT
Start: 2022-01-01 | End: 2022-01-01

## 2022-01-01 RX ORDER — SODIUM CHLORIDE 9 MG/ML
500 INJECTION INTRAMUSCULAR; INTRAVENOUS; SUBCUTANEOUS ONCE
Refills: 0 | Status: COMPLETED | OUTPATIENT
Start: 2022-01-01 | End: 2022-01-01

## 2022-01-01 RX ORDER — ALBUMIN HUMAN 25 %
250 VIAL (ML) INTRAVENOUS
Refills: 0 | Status: COMPLETED | OUTPATIENT
Start: 2022-01-01 | End: 2022-01-01

## 2022-01-01 RX ORDER — PHYTONADIONE (VIT K1) 5 MG
5 TABLET ORAL DAILY
Refills: 0 | Status: COMPLETED | OUTPATIENT
Start: 2022-01-01 | End: 2022-01-01

## 2022-01-01 RX ORDER — INSULIN LISPRO 100/ML
3 VIAL (ML) SUBCUTANEOUS
Refills: 0 | Status: DISCONTINUED | OUTPATIENT
Start: 2022-01-01 | End: 2022-01-01

## 2022-01-01 RX ORDER — PROPOFOL 10 MG/ML
30 INJECTION, EMULSION INTRAVENOUS
Qty: 1000 | Refills: 0 | Status: DISCONTINUED | OUTPATIENT
Start: 2022-01-01 | End: 2022-01-01

## 2022-01-01 RX ORDER — METOPROLOL TARTRATE 50 MG
1 TABLET ORAL
Qty: 0 | Refills: 0 | DISCHARGE

## 2022-01-01 RX ORDER — SIMETHICONE 80 MG/1
80 TABLET, CHEWABLE ORAL ONCE
Refills: 0 | Status: COMPLETED | OUTPATIENT
Start: 2022-01-01 | End: 2022-01-01

## 2022-01-01 RX ORDER — GLUCAGON INJECTION, SOLUTION 0.5 MG/.1ML
1 INJECTION, SOLUTION SUBCUTANEOUS ONCE
Refills: 0 | Status: DISCONTINUED | OUTPATIENT
Start: 2022-01-01 | End: 2022-01-01

## 2022-01-01 RX ORDER — METOPROLOL TARTRATE 50 MG
5 TABLET ORAL ONCE
Refills: 0 | Status: COMPLETED | OUTPATIENT
Start: 2022-01-01 | End: 2022-01-01

## 2022-01-01 RX ORDER — POSACONAZOLE 100 MG/1
300 TABLET, DELAYED RELEASE ORAL DAILY
Refills: 0 | Status: DISCONTINUED | OUTPATIENT
Start: 2022-01-01 | End: 2022-01-01

## 2022-01-01 RX ORDER — INSULIN GLARGINE 100 [IU]/ML
15 INJECTION, SOLUTION SUBCUTANEOUS AT BEDTIME
Refills: 0 | Status: DISCONTINUED | OUTPATIENT
Start: 2022-01-01 | End: 2022-01-01

## 2022-01-01 RX ORDER — CISATRACURIUM BESYLATE 2 MG/ML
20 INJECTION INTRAVENOUS ONCE
Refills: 0 | Status: DISCONTINUED | OUTPATIENT
Start: 2022-01-01 | End: 2022-01-01

## 2022-01-01 RX ORDER — FUROSEMIDE 40 MG
40 TABLET ORAL ONCE
Refills: 0 | Status: COMPLETED | OUTPATIENT
Start: 2022-01-01 | End: 2022-01-01

## 2022-01-01 RX ORDER — DEXTROSE 50 % IN WATER 50 %
12.5 SYRINGE (ML) INTRAVENOUS ONCE
Refills: 0 | Status: DISCONTINUED | OUTPATIENT
Start: 2022-01-01 | End: 2022-01-01

## 2022-01-01 RX ORDER — INSULIN GLARGINE 100 [IU]/ML
10 INJECTION, SOLUTION SUBCUTANEOUS AT BEDTIME
Refills: 0 | Status: DISCONTINUED | OUTPATIENT
Start: 2022-01-01 | End: 2022-01-01

## 2022-01-01 RX ORDER — HEPARIN SODIUM 5000 [USP'U]/ML
6500 INJECTION INTRAVENOUS; SUBCUTANEOUS EVERY 6 HOURS
Refills: 0 | Status: DISCONTINUED | OUTPATIENT
Start: 2022-01-01 | End: 2022-01-01

## 2022-01-01 RX ORDER — DEXAMETHASONE 0.5 MG/5ML
10 ELIXIR ORAL EVERY 12 HOURS
Refills: 0 | Status: DISCONTINUED | OUTPATIENT
Start: 2022-01-01 | End: 2022-01-01

## 2022-01-01 RX ORDER — VENETOCLAX 100 MG/1
50 TABLET, FILM COATED ORAL ONCE
Refills: 0 | Status: DISCONTINUED | OUTPATIENT
Start: 2022-01-01 | End: 2022-01-01

## 2022-01-01 RX ORDER — INSULIN LISPRO 100/ML
5 VIAL (ML) SUBCUTANEOUS ONCE
Refills: 0 | Status: COMPLETED | OUTPATIENT
Start: 2022-01-01 | End: 2022-01-01

## 2022-01-01 RX ORDER — PANTOPRAZOLE SODIUM 20 MG/1
40 TABLET, DELAYED RELEASE ORAL DAILY
Refills: 0 | Status: DISCONTINUED | OUTPATIENT
Start: 2022-01-01 | End: 2022-01-01

## 2022-01-01 RX ORDER — MAGNESIUM SULFATE 500 MG/ML
1 VIAL (ML) INJECTION ONCE
Refills: 0 | Status: COMPLETED | OUTPATIENT
Start: 2022-01-01 | End: 2022-01-01

## 2022-01-01 RX ORDER — INSULIN LISPRO 100/ML
1 VIAL (ML) SUBCUTANEOUS
Qty: 0 | Refills: 0 | DISCHARGE
Start: 2022-01-01

## 2022-01-01 RX ORDER — SODIUM CHLORIDE 9 MG/ML
1000 INJECTION INTRAMUSCULAR; INTRAVENOUS; SUBCUTANEOUS
Refills: 0 | Status: DISCONTINUED | OUTPATIENT
Start: 2022-01-01 | End: 2022-01-01

## 2022-01-01 RX ORDER — PIPERACILLIN AND TAZOBACTAM 4; .5 G/20ML; G/20ML
3.38 INJECTION, POWDER, LYOPHILIZED, FOR SOLUTION INTRAVENOUS ONCE
Refills: 0 | Status: DISCONTINUED | OUTPATIENT
Start: 2022-01-01 | End: 2022-01-01

## 2022-01-01 RX ORDER — HEPARIN SODIUM 5000 [USP'U]/ML
3000 INJECTION INTRAVENOUS; SUBCUTANEOUS EVERY 6 HOURS
Refills: 0 | Status: DISCONTINUED | OUTPATIENT
Start: 2022-01-01 | End: 2022-01-01

## 2022-01-01 RX ORDER — CHLORHEXIDINE GLUCONATE 213 G/1000ML
1 SOLUTION TOPICAL
Refills: 0 | Status: DISCONTINUED | OUTPATIENT
Start: 2022-01-01 | End: 2022-01-01

## 2022-01-01 RX ORDER — SODIUM CHLORIDE 9 MG/ML
1000 INJECTION, SOLUTION INTRAVENOUS ONCE
Refills: 0 | Status: COMPLETED | OUTPATIENT
Start: 2022-01-01 | End: 2022-01-01

## 2022-01-01 RX ORDER — POTASSIUM PHOSPHATE, MONOBASIC POTASSIUM PHOSPHATE, DIBASIC 236; 224 MG/ML; MG/ML
30 INJECTION, SOLUTION INTRAVENOUS ONCE
Refills: 0 | Status: COMPLETED | OUTPATIENT
Start: 2022-01-01 | End: 2022-01-01

## 2022-01-01 RX ORDER — INSULIN LISPRO 100/ML
2 VIAL (ML) SUBCUTANEOUS
Refills: 0 | Status: DISCONTINUED | OUTPATIENT
Start: 2022-01-01 | End: 2022-01-01

## 2022-01-01 RX ORDER — HEPARIN SODIUM 5000 [USP'U]/ML
INJECTION INTRAVENOUS; SUBCUTANEOUS
Qty: 25000 | Refills: 0 | Status: DISCONTINUED | OUTPATIENT
Start: 2022-01-01 | End: 2022-01-01

## 2022-01-01 RX ORDER — ENOXAPARIN SODIUM 100 MG/ML
70 INJECTION SUBCUTANEOUS EVERY 12 HOURS
Refills: 0 | Status: DISCONTINUED | OUTPATIENT
Start: 2022-01-01 | End: 2022-01-01

## 2022-01-01 RX ORDER — PIPERACILLIN AND TAZOBACTAM 4; .5 G/20ML; G/20ML
3.38 INJECTION, POWDER, LYOPHILIZED, FOR SOLUTION INTRAVENOUS EVERY 8 HOURS
Refills: 0 | Status: DISCONTINUED | OUTPATIENT
Start: 2022-01-01 | End: 2022-01-01

## 2022-01-01 RX ORDER — CILOSTAZOL 100 MG/1
2 TABLET ORAL
Qty: 0 | Refills: 0 | DISCHARGE

## 2022-01-01 RX ORDER — DEXTROSE 50 % IN WATER 50 %
50 SYRINGE (ML) INTRAVENOUS ONCE
Refills: 0 | Status: COMPLETED | OUTPATIENT
Start: 2022-01-01 | End: 2022-01-01

## 2022-01-01 RX ORDER — VERAPAMIL HCL 240 MG
1 CAPSULE, EXTENDED RELEASE PELLETS 24 HR ORAL
Qty: 0 | Refills: 0 | DISCHARGE

## 2022-01-01 RX ORDER — PANTOPRAZOLE SODIUM 20 MG/1
40 TABLET, DELAYED RELEASE ORAL
Refills: 0 | Status: DISCONTINUED | OUTPATIENT
Start: 2022-01-01 | End: 2022-01-01

## 2022-01-01 RX ORDER — VERAPAMIL HCL 240 MG
240 CAPSULE, EXTENDED RELEASE PELLETS 24 HR ORAL DAILY
Refills: 0 | Status: DISCONTINUED | OUTPATIENT
Start: 2022-01-01 | End: 2022-01-01

## 2022-01-01 RX ORDER — CHLORHEXIDINE GLUCONATE 213 G/1000ML
15 SOLUTION TOPICAL EVERY 12 HOURS
Refills: 0 | Status: DISCONTINUED | OUTPATIENT
Start: 2022-01-01 | End: 2022-01-01

## 2022-01-01 RX ORDER — ONDANSETRON 8 MG/1
4 TABLET, FILM COATED ORAL EVERY 8 HOURS
Refills: 0 | Status: DISCONTINUED | OUTPATIENT
Start: 2022-01-01 | End: 2022-01-01

## 2022-01-01 RX ORDER — TRETINOIN 10 MG/1
40 CAPSULE, LIQUID FILLED ORAL EVERY 12 HOURS
Refills: 0 | Status: DISCONTINUED | OUTPATIENT
Start: 2022-01-01 | End: 2022-01-01

## 2022-01-01 RX ORDER — ALLOPURINOL 300 MG
300 TABLET ORAL DAILY
Refills: 0 | Status: DISCONTINUED | OUTPATIENT
Start: 2022-01-01 | End: 2022-01-01

## 2022-01-01 RX ORDER — FAMOTIDINE 10 MG/ML
20 INJECTION INTRAVENOUS ONCE
Refills: 0 | Status: COMPLETED | OUTPATIENT
Start: 2022-01-01 | End: 2022-01-01

## 2022-01-01 RX ORDER — INSULIN GLARGINE 100 [IU]/ML
15 INJECTION, SOLUTION SUBCUTANEOUS
Qty: 0 | Refills: 0 | DISCHARGE
Start: 2022-01-01

## 2022-01-01 RX ORDER — VERAPAMIL HCL 240 MG
1 CAPSULE, EXTENDED RELEASE PELLETS 24 HR ORAL
Qty: 0 | Refills: 0 | DISCHARGE
Start: 2022-01-01

## 2022-01-01 RX ORDER — VENETOCLAX 100 MG/1
50 TABLET, FILM COATED ORAL ONCE
Refills: 0 | Status: COMPLETED | OUTPATIENT
Start: 2022-01-01 | End: 2022-01-01

## 2022-01-01 RX ORDER — FUROSEMIDE 40 MG
60 TABLET ORAL ONCE
Refills: 0 | Status: COMPLETED | OUTPATIENT
Start: 2022-01-01 | End: 2022-01-01

## 2022-01-01 RX ORDER — FUROSEMIDE 40 MG
20 TABLET ORAL ONCE
Refills: 0 | Status: COMPLETED | OUTPATIENT
Start: 2022-01-01 | End: 2022-01-01

## 2022-01-01 RX ORDER — BUMETANIDE 0.25 MG/ML
1 INJECTION INTRAMUSCULAR; INTRAVENOUS ONCE
Refills: 0 | Status: COMPLETED | OUTPATIENT
Start: 2022-01-01 | End: 2022-01-01

## 2022-01-01 RX ORDER — HEPARIN SODIUM 5000 [USP'U]/ML
6000 INJECTION INTRAVENOUS; SUBCUTANEOUS EVERY 6 HOURS
Refills: 0 | Status: DISCONTINUED | OUTPATIENT
Start: 2022-01-01 | End: 2022-01-01

## 2022-01-01 RX ORDER — PIPERACILLIN AND TAZOBACTAM 4; .5 G/20ML; G/20ML
3.38 INJECTION, POWDER, LYOPHILIZED, FOR SOLUTION INTRAVENOUS EVERY 12 HOURS
Refills: 0 | Status: DISCONTINUED | OUTPATIENT
Start: 2022-01-01 | End: 2022-01-01

## 2022-01-01 RX ORDER — FAMOTIDINE 10 MG/ML
20 INJECTION INTRAVENOUS DAILY
Refills: 0 | Status: DISCONTINUED | OUTPATIENT
Start: 2022-01-01 | End: 2022-01-01

## 2022-01-01 RX ORDER — INSULIN LISPRO 100/ML
5 VIAL (ML) SUBCUTANEOUS
Refills: 0 | Status: DISCONTINUED | OUTPATIENT
Start: 2022-01-01 | End: 2022-01-01

## 2022-01-01 RX ORDER — DEXTROSE 50 % IN WATER 50 %
25 SYRINGE (ML) INTRAVENOUS ONCE
Refills: 0 | Status: DISCONTINUED | OUTPATIENT
Start: 2022-01-01 | End: 2022-01-01

## 2022-01-01 RX ORDER — INSULIN LISPRO 100/ML
VIAL (ML) SUBCUTANEOUS EVERY 6 HOURS
Refills: 0 | Status: DISCONTINUED | OUTPATIENT
Start: 2022-01-01 | End: 2022-01-01

## 2022-01-01 RX ORDER — SODIUM BICARBONATE 1 MEQ/ML
0.1 SYRINGE (ML) INTRAVENOUS
Qty: 150 | Refills: 0 | Status: DISCONTINUED | OUTPATIENT
Start: 2022-01-01 | End: 2022-01-01

## 2022-01-01 RX ORDER — CALCIUM GLUCONATE 100 MG/ML
2 VIAL (ML) INTRAVENOUS ONCE
Refills: 0 | Status: COMPLETED | OUTPATIENT
Start: 2022-01-01 | End: 2022-01-01

## 2022-01-01 RX ORDER — CALCIUM GLUCONATE 100 MG/ML
1 VIAL (ML) INTRAVENOUS ONCE
Refills: 0 | Status: COMPLETED | OUTPATIENT
Start: 2022-01-01 | End: 2022-01-01

## 2022-01-01 RX ORDER — INSULIN LISPRO 100/ML
VIAL (ML) SUBCUTANEOUS
Refills: 0 | Status: DISCONTINUED | OUTPATIENT
Start: 2022-01-01 | End: 2022-01-01

## 2022-01-01 RX ORDER — HYDROXYUREA 500 MG/1
1000 CAPSULE ORAL EVERY 8 HOURS
Refills: 0 | Status: DISCONTINUED | OUTPATIENT
Start: 2022-01-01 | End: 2022-01-01

## 2022-01-01 RX ORDER — VANCOMYCIN HCL 1 G
1000 VIAL (EA) INTRAVENOUS ONCE
Refills: 0 | Status: DISCONTINUED | OUTPATIENT
Start: 2022-01-01 | End: 2022-01-01

## 2022-01-01 RX ORDER — CHLORHEXIDINE GLUCONATE 213 G/1000ML
1 SOLUTION TOPICAL DAILY
Refills: 0 | Status: DISCONTINUED | OUTPATIENT
Start: 2022-01-01 | End: 2022-01-01

## 2022-01-01 RX ORDER — POTASSIUM PHOSPHATE, MONOBASIC POTASSIUM PHOSPHATE, DIBASIC 236; 224 MG/ML; MG/ML
15 INJECTION, SOLUTION INTRAVENOUS ONCE
Refills: 0 | Status: COMPLETED | OUTPATIENT
Start: 2022-01-01 | End: 2022-01-01

## 2022-01-01 RX ORDER — POTASSIUM CHLORIDE 20 MEQ
40 PACKET (EA) ORAL ONCE
Refills: 0 | Status: COMPLETED | OUTPATIENT
Start: 2022-01-01 | End: 2022-01-01

## 2022-01-01 RX ORDER — VENETOCLAX 100 MG/1
2 TABLET, FILM COATED ORAL
Qty: 60 | Refills: 3
Start: 2022-01-01 | End: 2022-08-11

## 2022-01-01 RX ORDER — METOPROLOL TARTRATE 50 MG
50 TABLET ORAL
Refills: 0 | Status: DISCONTINUED | OUTPATIENT
Start: 2022-01-01 | End: 2022-01-01

## 2022-01-01 RX ORDER — MAGNESIUM SULFATE 500 MG/ML
2 VIAL (ML) INJECTION ONCE
Refills: 0 | Status: COMPLETED | OUTPATIENT
Start: 2022-01-01 | End: 2022-01-01

## 2022-01-01 RX ORDER — CALCIUM ACETATE 667 MG
667 TABLET ORAL
Refills: 0 | Status: COMPLETED | OUTPATIENT
Start: 2022-01-01 | End: 2022-01-01

## 2022-01-01 RX ORDER — NOREPINEPHRINE BITARTRATE/D5W 8 MG/250ML
0.05 PLASTIC BAG, INJECTION (ML) INTRAVENOUS
Qty: 8 | Refills: 0 | Status: DISCONTINUED | OUTPATIENT
Start: 2022-01-01 | End: 2022-01-01

## 2022-01-01 RX ORDER — RASBURICASE 7.5 MG
3 KIT INTRAVENOUS ONCE
Refills: 0 | Status: COMPLETED | OUTPATIENT
Start: 2022-01-01 | End: 2022-01-01

## 2022-01-01 RX ORDER — VASOPRESSIN 20 [USP'U]/ML
0.04 INJECTION INTRAVENOUS
Qty: 50 | Refills: 0 | Status: DISCONTINUED | OUTPATIENT
Start: 2022-01-01 | End: 2022-01-01

## 2022-01-01 RX ORDER — VENETOCLAX 100 MG/1
100 TABLET, FILM COATED ORAL ONCE
Refills: 0 | Status: DISCONTINUED | OUTPATIENT
Start: 2022-01-01 | End: 2022-01-01

## 2022-01-01 RX ORDER — METOPROLOL TARTRATE 50 MG
1 TABLET ORAL
Qty: 0 | Refills: 0 | DISCHARGE
Start: 2022-01-01

## 2022-01-01 RX ORDER — MIDAZOLAM HYDROCHLORIDE 1 MG/ML
4 INJECTION, SOLUTION INTRAMUSCULAR; INTRAVENOUS ONCE
Refills: 0 | Status: DISCONTINUED | OUTPATIENT
Start: 2022-01-01 | End: 2022-01-01

## 2022-01-01 RX ORDER — DECITABINE 50 MG/20ML
35 INJECTION, POWDER, LYOPHILIZED, FOR SOLUTION INTRAVENOUS EVERY 24 HOURS
Refills: 0 | Status: COMPLETED | OUTPATIENT
Start: 2022-01-01 | End: 2022-01-01

## 2022-01-01 RX ORDER — LIDOCAINE HCL 20 MG/ML
20 VIAL (ML) INJECTION ONCE
Refills: 0 | Status: DISCONTINUED | OUTPATIENT
Start: 2022-01-01 | End: 2022-01-01

## 2022-01-01 RX ORDER — TRETINOIN 10 MG/1
4 CAPSULE, LIQUID FILLED ORAL
Qty: 0 | Refills: 0 | DISCHARGE
Start: 2022-01-01

## 2022-01-01 RX ORDER — DEXTROSE 50 % IN WATER 50 %
15 SYRINGE (ML) INTRAVENOUS ONCE
Refills: 0 | Status: DISCONTINUED | OUTPATIENT
Start: 2022-01-01 | End: 2022-01-01

## 2022-01-01 RX ORDER — CALCIUM ACETATE 667 MG
1334 TABLET ORAL EVERY 4 HOURS
Refills: 0 | Status: COMPLETED | OUTPATIENT
Start: 2022-01-01 | End: 2022-01-01

## 2022-01-01 RX ORDER — INSULIN LISPRO 100/ML
15 VIAL (ML) SUBCUTANEOUS ONCE
Refills: 0 | Status: COMPLETED | OUTPATIENT
Start: 2022-01-01 | End: 2022-01-01

## 2022-01-01 RX ORDER — ALLOPURINOL 300 MG
1 TABLET ORAL
Qty: 0 | Refills: 0 | DISCHARGE
Start: 2022-01-01

## 2022-01-01 RX ORDER — POTASSIUM CHLORIDE 20 MEQ
20 PACKET (EA) ORAL
Refills: 0 | Status: COMPLETED | OUTPATIENT
Start: 2022-01-01 | End: 2022-01-01

## 2022-01-01 RX ORDER — VENETOCLAX 100 MG/1
1 TABLET, FILM COATED ORAL
Qty: 30 | Refills: 2
Start: 2022-01-01 | End: 2022-07-16

## 2022-01-01 RX ORDER — ONDANSETRON 8 MG/1
8 TABLET, FILM COATED ORAL EVERY 24 HOURS
Refills: 0 | Status: COMPLETED | OUTPATIENT
Start: 2022-01-01 | End: 2022-01-01

## 2022-01-01 RX ORDER — PIPERACILLIN AND TAZOBACTAM 4; .5 G/20ML; G/20ML
3.38 INJECTION, POWDER, LYOPHILIZED, FOR SOLUTION INTRAVENOUS ONCE
Refills: 0 | Status: COMPLETED | OUTPATIENT
Start: 2022-01-01 | End: 2022-01-01

## 2022-01-01 RX ORDER — HYDROXYZINE HCL 10 MG
25 TABLET ORAL ONCE
Refills: 0 | Status: COMPLETED | OUTPATIENT
Start: 2022-01-01 | End: 2022-01-01

## 2022-01-01 RX ORDER — VENETOCLAX 100 MG/1
20 TABLET, FILM COATED ORAL ONCE
Refills: 0 | Status: COMPLETED | OUTPATIENT
Start: 2022-01-01 | End: 2022-01-01

## 2022-01-01 RX ORDER — IPRATROPIUM BROMIDE 0.2 MG/ML
500 SOLUTION, NON-ORAL INHALATION ONCE
Refills: 0 | Status: COMPLETED | OUTPATIENT
Start: 2022-01-01 | End: 2022-01-01

## 2022-01-01 RX ORDER — POTASSIUM CHLORIDE 20 MEQ
10 PACKET (EA) ORAL
Refills: 0 | Status: DISCONTINUED | OUTPATIENT
Start: 2022-01-01 | End: 2022-01-01

## 2022-01-01 RX ORDER — FUROSEMIDE 40 MG
40 TABLET ORAL ONCE
Refills: 0 | Status: DISCONTINUED | OUTPATIENT
Start: 2022-01-01 | End: 2022-01-01

## 2022-01-01 RX ORDER — ACETAMINOPHEN 500 MG
650 TABLET ORAL EVERY 6 HOURS
Refills: 0 | Status: DISCONTINUED | OUTPATIENT
Start: 2022-01-01 | End: 2022-01-01

## 2022-01-01 RX ORDER — PANTOPRAZOLE SODIUM 20 MG/1
40 TABLET, DELAYED RELEASE ORAL
Qty: 0 | Refills: 0 | DISCHARGE
Start: 2022-01-01

## 2022-01-01 RX ORDER — HEPARIN SODIUM 5000 [USP'U]/ML
5000 INJECTION INTRAVENOUS; SUBCUTANEOUS ONCE
Refills: 0 | Status: DISCONTINUED | OUTPATIENT
Start: 2022-01-01 | End: 2022-01-01

## 2022-01-01 RX ORDER — INSULIN LISPRO 100/ML
5 VIAL (ML) SUBCUTANEOUS
Qty: 0 | Refills: 0 | DISCHARGE
Start: 2022-01-01

## 2022-01-01 RX ORDER — POTASSIUM CHLORIDE 20 MEQ
20 PACKET (EA) ORAL ONCE
Refills: 0 | Status: COMPLETED | OUTPATIENT
Start: 2022-01-01 | End: 2022-01-01

## 2022-01-01 RX ORDER — HYDROXYUREA 500 MG/1
2 CAPSULE ORAL
Qty: 0 | Refills: 0 | DISCHARGE
Start: 2022-01-01

## 2022-01-01 RX ORDER — POSACONAZOLE 100 MG/1
3 TABLET, DELAYED RELEASE ORAL
Qty: 90 | Refills: 2
Start: 2022-01-01 | End: 2022-07-12

## 2022-01-01 RX ORDER — POSACONAZOLE 100 MG/1
300 TABLET, DELAYED RELEASE ORAL DAILY
Refills: 0 | Status: COMPLETED | OUTPATIENT
Start: 2022-01-01 | End: 2022-01-01

## 2022-01-01 RX ORDER — PANTOPRAZOLE SODIUM 20 MG/1
40 TABLET, DELAYED RELEASE ORAL ONCE
Refills: 0 | Status: COMPLETED | OUTPATIENT
Start: 2022-01-01 | End: 2022-01-01

## 2022-01-01 RX ORDER — METOPROLOL TARTRATE 50 MG
25 TABLET ORAL DAILY
Refills: 0 | Status: DISCONTINUED | OUTPATIENT
Start: 2022-01-01 | End: 2022-01-01

## 2022-01-01 RX ORDER — DEXAMETHASONE 0.5 MG/5ML
41.67 ELIXIR ORAL
Qty: 0 | Refills: 0 | DISCHARGE
Start: 2022-01-01

## 2022-01-01 RX ORDER — VENETOCLAX 100 MG/1
100 TABLET, FILM COATED ORAL
Refills: 0 | Status: DISCONTINUED | OUTPATIENT
Start: 2022-01-01 | End: 2022-01-01

## 2022-01-01 RX ORDER — SALIVA SUBSTITUTE COMB NO.11 351 MG
15 POWDER IN PACKET (EA) MUCOUS MEMBRANE THREE TIMES A DAY
Refills: 0 | Status: DISCONTINUED | OUTPATIENT
Start: 2022-01-01 | End: 2022-01-01

## 2022-01-01 RX ORDER — HEPARIN SODIUM 5000 [USP'U]/ML
13 INJECTION INTRAVENOUS; SUBCUTANEOUS
Qty: 0 | Refills: 0 | DISCHARGE
Start: 2022-01-01

## 2022-01-01 RX ORDER — BENZOCAINE AND MENTHOL 5; 1 G/100ML; G/100ML
1 LIQUID ORAL
Refills: 0 | Status: DISCONTINUED | OUTPATIENT
Start: 2022-01-01 | End: 2022-01-01

## 2022-01-01 RX ORDER — HEPARIN SODIUM 5000 [USP'U]/ML
800 INJECTION INTRAVENOUS; SUBCUTANEOUS
Qty: 25000 | Refills: 0 | Status: DISCONTINUED | OUTPATIENT
Start: 2022-01-01 | End: 2022-01-01

## 2022-01-01 RX ORDER — IPRATROPIUM/ALBUTEROL SULFATE 18-103MCG
3 AEROSOL WITH ADAPTER (GRAM) INHALATION ONCE
Refills: 0 | Status: COMPLETED | OUTPATIENT
Start: 2022-01-01 | End: 2022-01-01

## 2022-01-01 RX ORDER — ACETAMINOPHEN 500 MG
1000 TABLET ORAL ONCE
Refills: 0 | Status: COMPLETED | OUTPATIENT
Start: 2022-01-01 | End: 2022-01-01

## 2022-01-01 RX ADMIN — Medication 240 MILLIGRAM(S): at 06:13

## 2022-01-01 RX ADMIN — VENETOCLAX 50 MILLIGRAM(S): 100 TABLET, FILM COATED ORAL at 14:47

## 2022-01-01 RX ADMIN — Medication 8: at 09:41

## 2022-01-01 RX ADMIN — HYDROXYUREA 1000 MILLIGRAM(S): 500 CAPSULE ORAL at 13:52

## 2022-01-01 RX ADMIN — Medication 20 MILLIGRAM(S): at 21:01

## 2022-01-01 RX ADMIN — Medication 100 GRAM(S): at 18:12

## 2022-01-01 RX ADMIN — Medication 667 MILLIGRAM(S): at 22:41

## 2022-01-01 RX ADMIN — Medication 50 MILLIEQUIVALENT(S): at 10:24

## 2022-01-01 RX ADMIN — SODIUM CHLORIDE 1000 MILLILITER(S): 9 INJECTION, SOLUTION INTRAVENOUS at 15:38

## 2022-01-01 RX ADMIN — Medication 2: at 17:53

## 2022-01-01 RX ADMIN — Medication 50 GRAM(S): at 14:00

## 2022-01-01 RX ADMIN — Medication 5 UNIT(S): at 08:43

## 2022-01-01 RX ADMIN — Medication 650 MILLIGRAM(S): at 09:18

## 2022-01-01 RX ADMIN — Medication 15 MILLILITER(S): at 13:05

## 2022-01-01 RX ADMIN — Medication 25 MILLIGRAM(S): at 22:00

## 2022-01-01 RX ADMIN — Medication 30 MILLILITER(S): at 08:40

## 2022-01-01 RX ADMIN — Medication 50 MILLIGRAM(S): at 17:09

## 2022-01-01 RX ADMIN — Medication 240 MILLIGRAM(S): at 05:07

## 2022-01-01 RX ADMIN — Medication 40 MILLIGRAM(S): at 16:50

## 2022-01-01 RX ADMIN — HEPARIN SODIUM 1300 UNIT(S)/HR: 5000 INJECTION INTRAVENOUS; SUBCUTANEOUS at 23:10

## 2022-01-01 RX ADMIN — DECITABINE 57 MILLIGRAM(S): 50 INJECTION, POWDER, LYOPHILIZED, FOR SOLUTION INTRAVENOUS at 12:38

## 2022-01-01 RX ADMIN — Medication 650 MILLIGRAM(S): at 09:34

## 2022-01-01 RX ADMIN — Medication 15 MILLILITER(S): at 22:01

## 2022-01-01 RX ADMIN — SODIUM CHLORIDE 50 MILLILITER(S): 9 INJECTION INTRAMUSCULAR; INTRAVENOUS; SUBCUTANEOUS at 06:56

## 2022-01-01 RX ADMIN — CHLORHEXIDINE GLUCONATE 1 APPLICATION(S): 213 SOLUTION TOPICAL at 11:56

## 2022-01-01 RX ADMIN — Medication 15 MILLILITER(S): at 22:10

## 2022-01-01 RX ADMIN — Medication 15 MILLILITER(S): at 06:13

## 2022-01-01 RX ADMIN — POSACONAZOLE 300 MILLIGRAM(S): 100 TABLET, DELAYED RELEASE ORAL at 12:00

## 2022-01-01 RX ADMIN — Medication 15 MILLILITER(S): at 21:55

## 2022-01-01 RX ADMIN — Medication 15 MILLILITER(S): at 06:19

## 2022-01-01 RX ADMIN — SODIUM CHLORIDE 50 MILLILITER(S): 9 INJECTION INTRAMUSCULAR; INTRAVENOUS; SUBCUTANEOUS at 05:28

## 2022-01-01 RX ADMIN — Medication 667 MILLIGRAM(S): at 17:09

## 2022-01-01 RX ADMIN — Medication 6: at 09:12

## 2022-01-01 RX ADMIN — Medication 50 MILLIGRAM(S): at 17:24

## 2022-01-01 RX ADMIN — Medication 300 MILLIGRAM(S): at 11:14

## 2022-01-01 RX ADMIN — INSULIN GLARGINE 15 UNIT(S): 100 INJECTION, SOLUTION SUBCUTANEOUS at 21:43

## 2022-01-01 RX ADMIN — Medication 2 UNIT(S): at 17:53

## 2022-01-01 RX ADMIN — Medication 100 MILLIGRAM(S): at 05:59

## 2022-01-01 RX ADMIN — Medication 300 MILLIGRAM(S): at 11:26

## 2022-01-01 RX ADMIN — HEPARIN SODIUM 1100 UNIT(S)/HR: 5000 INJECTION INTRAVENOUS; SUBCUTANEOUS at 07:33

## 2022-01-01 RX ADMIN — FAMOTIDINE 20 MILLIGRAM(S): 10 INJECTION INTRAVENOUS at 11:44

## 2022-01-01 RX ADMIN — POSACONAZOLE 300 MILLIGRAM(S): 100 TABLET, DELAYED RELEASE ORAL at 12:17

## 2022-01-01 RX ADMIN — Medication 20 MILLIGRAM(S): at 23:07

## 2022-01-01 RX ADMIN — Medication 650 MILLIGRAM(S): at 16:53

## 2022-01-01 RX ADMIN — Medication 500 MICROGRAM(S): at 19:50

## 2022-01-01 RX ADMIN — INSULIN GLARGINE 15 UNIT(S): 100 INJECTION, SOLUTION SUBCUTANEOUS at 22:10

## 2022-01-01 RX ADMIN — HEPARIN SODIUM 200 UNIT(S)/HR: 5000 INJECTION INTRAVENOUS; SUBCUTANEOUS at 11:43

## 2022-01-01 RX ADMIN — ONDANSETRON 8 MILLIGRAM(S): 8 TABLET, FILM COATED ORAL at 12:24

## 2022-01-01 RX ADMIN — Medication 3 UNIT(S): at 08:48

## 2022-01-01 RX ADMIN — SODIUM CHLORIDE 150 MILLILITER(S): 9 INJECTION, SOLUTION INTRAVENOUS at 20:34

## 2022-01-01 RX ADMIN — Medication 5 UNIT(S): at 17:44

## 2022-01-01 RX ADMIN — Medication 50 MILLIGRAM(S): at 07:41

## 2022-01-01 RX ADMIN — HEPARIN SODIUM 1300 UNIT(S)/HR: 5000 INJECTION INTRAVENOUS; SUBCUTANEOUS at 05:54

## 2022-01-01 RX ADMIN — Medication 100 MILLIGRAM(S): at 21:01

## 2022-01-01 RX ADMIN — PANTOPRAZOLE SODIUM 40 MILLIGRAM(S): 20 TABLET, DELAYED RELEASE ORAL at 17:59

## 2022-01-01 RX ADMIN — Medication 40 MILLIGRAM(S): at 13:51

## 2022-01-01 RX ADMIN — Medication 50 MILLIGRAM(S): at 06:20

## 2022-01-01 RX ADMIN — FAMOTIDINE 20 MILLIGRAM(S): 10 INJECTION INTRAVENOUS at 11:59

## 2022-01-01 RX ADMIN — Medication 50 MILLIGRAM(S): at 05:19

## 2022-01-01 RX ADMIN — Medication 667 MILLIGRAM(S): at 14:29

## 2022-01-01 RX ADMIN — HYDROXYUREA 1000 MILLIGRAM(S): 500 CAPSULE ORAL at 17:24

## 2022-01-01 RX ADMIN — Medication 10: at 17:44

## 2022-01-01 RX ADMIN — SIMETHICONE 80 MILLIGRAM(S): 80 TABLET, CHEWABLE ORAL at 03:59

## 2022-01-01 RX ADMIN — Medication 20 MILLIEQUIVALENT(S): at 04:36

## 2022-01-01 RX ADMIN — Medication 300 MILLIGRAM(S): at 11:59

## 2022-01-01 RX ADMIN — Medication 100 MILLIEQUIVALENT(S): at 17:04

## 2022-01-01 RX ADMIN — Medication 20 MILLIGRAM(S): at 17:44

## 2022-01-01 RX ADMIN — Medication 100 MILLIGRAM(S): at 13:17

## 2022-01-01 RX ADMIN — PIPERACILLIN AND TAZOBACTAM 200 GRAM(S): 4; .5 INJECTION, POWDER, LYOPHILIZED, FOR SOLUTION INTRAVENOUS at 02:46

## 2022-01-01 RX ADMIN — Medication 50 MILLIGRAM(S): at 17:43

## 2022-01-01 RX ADMIN — Medication 50 MILLIGRAM(S): at 21:57

## 2022-01-01 RX ADMIN — HYDROXYUREA 1000 MILLIGRAM(S): 500 CAPSULE ORAL at 01:17

## 2022-01-01 RX ADMIN — Medication 100 MILLIGRAM(S): at 06:27

## 2022-01-01 RX ADMIN — HEPARIN SODIUM 1300 UNIT(S)/HR: 5000 INJECTION INTRAVENOUS; SUBCUTANEOUS at 17:24

## 2022-01-01 RX ADMIN — Medication 12: at 08:34

## 2022-01-01 RX ADMIN — POTASSIUM PHOSPHATE, MONOBASIC POTASSIUM PHOSPHATE, DIBASIC 62.5 MILLIMOLE(S): 236; 224 INJECTION, SOLUTION INTRAVENOUS at 22:40

## 2022-01-01 RX ADMIN — HYDROXYUREA 1000 MILLIGRAM(S): 500 CAPSULE ORAL at 05:55

## 2022-01-01 RX ADMIN — Medication 15 UNIT(S): at 13:00

## 2022-01-01 RX ADMIN — Medication 15 MILLILITER(S): at 06:00

## 2022-01-01 RX ADMIN — Medication 5 MILLIGRAM(S): at 18:08

## 2022-01-01 RX ADMIN — TRETINOIN 40 MILLIGRAM(S): 10 CAPSULE, LIQUID FILLED ORAL at 05:28

## 2022-01-01 RX ADMIN — Medication 3 UNIT(S): at 13:18

## 2022-01-01 RX ADMIN — Medication 15 MILLILITER(S): at 05:57

## 2022-01-01 RX ADMIN — SODIUM CHLORIDE 50 MILLILITER(S): 9 INJECTION INTRAMUSCULAR; INTRAVENOUS; SUBCUTANEOUS at 06:29

## 2022-01-01 RX ADMIN — Medication 50 MILLIGRAM(S): at 18:11

## 2022-01-01 RX ADMIN — SIMETHICONE 80 MILLIGRAM(S): 80 TABLET, CHEWABLE ORAL at 22:39

## 2022-01-01 RX ADMIN — Medication 240 MILLIGRAM(S): at 05:57

## 2022-01-01 RX ADMIN — Medication 3 UNIT(S): at 18:31

## 2022-01-01 RX ADMIN — Medication 20 MILLIEQUIVALENT(S): at 14:29

## 2022-01-01 RX ADMIN — POTASSIUM PHOSPHATE, MONOBASIC POTASSIUM PHOSPHATE, DIBASIC 62.5 MILLIMOLE(S): 236; 224 INJECTION, SOLUTION INTRAVENOUS at 20:23

## 2022-01-01 RX ADMIN — Medication 3 MILLILITER(S): at 22:54

## 2022-01-01 RX ADMIN — Medication 100 MILLIEQUIVALENT(S): at 15:38

## 2022-01-01 RX ADMIN — Medication 650 MILLIGRAM(S): at 01:32

## 2022-01-01 RX ADMIN — Medication 5 UNIT(S): at 18:54

## 2022-01-01 RX ADMIN — Medication 15 MILLILITER(S): at 13:17

## 2022-01-01 RX ADMIN — Medication 2: at 13:55

## 2022-01-01 RX ADMIN — Medication 1334 MILLIGRAM(S): at 18:09

## 2022-01-01 RX ADMIN — Medication 3 UNIT(S): at 20:05

## 2022-01-01 RX ADMIN — Medication 1334 MILLIGRAM(S): at 21:01

## 2022-01-01 RX ADMIN — Medication 5 UNIT(S): at 15:10

## 2022-01-01 RX ADMIN — Medication 100 MILLIGRAM(S): at 18:51

## 2022-01-01 RX ADMIN — HEPARIN SODIUM 1300 UNIT(S)/HR: 5000 INJECTION INTRAVENOUS; SUBCUTANEOUS at 18:40

## 2022-01-01 RX ADMIN — Medication 300 MILLIGRAM(S): at 12:24

## 2022-01-01 RX ADMIN — Medication 100 MILLIGRAM(S): at 21:55

## 2022-01-01 RX ADMIN — Medication 240 MILLIGRAM(S): at 06:27

## 2022-01-01 RX ADMIN — Medication 102 MILLIGRAM(S): at 08:47

## 2022-01-01 RX ADMIN — Medication 100 MILLIGRAM(S): at 06:20

## 2022-01-01 RX ADMIN — Medication 5 MILLIGRAM(S): at 18:06

## 2022-01-01 RX ADMIN — Medication 300 MILLIGRAM(S): at 14:58

## 2022-01-01 RX ADMIN — Medication 100 MILLIGRAM(S): at 05:38

## 2022-01-01 RX ADMIN — Medication 650 MILLIGRAM(S): at 22:39

## 2022-01-01 RX ADMIN — Medication 15 MILLILITER(S): at 22:39

## 2022-01-01 RX ADMIN — INSULIN GLARGINE 15 UNIT(S): 100 INJECTION, SOLUTION SUBCUTANEOUS at 22:03

## 2022-01-01 RX ADMIN — TRETINOIN 40 MILLIGRAM(S): 10 CAPSULE, LIQUID FILLED ORAL at 13:19

## 2022-01-01 RX ADMIN — RASBURICASE 104 MILLIGRAM(S): KIT at 20:23

## 2022-01-01 RX ADMIN — Medication 200 GRAM(S): at 03:45

## 2022-01-01 RX ADMIN — Medication 40 MILLIGRAM(S): at 12:44

## 2022-01-01 RX ADMIN — HEPARIN SODIUM 900 UNIT(S)/HR: 5000 INJECTION INTRAVENOUS; SUBCUTANEOUS at 19:20

## 2022-01-01 RX ADMIN — FAMOTIDINE 20 MILLIGRAM(S): 10 INJECTION INTRAVENOUS at 15:44

## 2022-01-01 RX ADMIN — INSULIN GLARGINE 15 UNIT(S): 100 INJECTION, SOLUTION SUBCUTANEOUS at 21:53

## 2022-01-01 RX ADMIN — POSACONAZOLE 300 MILLIGRAM(S): 100 TABLET, DELAYED RELEASE ORAL at 15:43

## 2022-01-01 RX ADMIN — Medication 100 MILLIGRAM(S): at 22:41

## 2022-01-01 RX ADMIN — Medication 3: at 22:31

## 2022-01-01 RX ADMIN — Medication 300 MILLIGRAM(S): at 11:36

## 2022-01-01 RX ADMIN — INSULIN GLARGINE 15 UNIT(S): 100 INJECTION, SOLUTION SUBCUTANEOUS at 21:50

## 2022-01-01 RX ADMIN — Medication 40 MILLIGRAM(S): at 13:17

## 2022-01-01 RX ADMIN — Medication 300 MILLIGRAM(S): at 12:17

## 2022-01-01 RX ADMIN — Medication 100 MILLIGRAM(S): at 14:29

## 2022-01-01 RX ADMIN — Medication 100 MILLIGRAM(S): at 06:13

## 2022-01-01 RX ADMIN — HEPARIN SODIUM 1300 UNIT(S)/HR: 5000 INJECTION INTRAVENOUS; SUBCUTANEOUS at 08:24

## 2022-01-01 RX ADMIN — Medication 125 MILLILITER(S): at 02:00

## 2022-01-01 RX ADMIN — Medication 100 GRAM(S): at 22:51

## 2022-01-01 RX ADMIN — Medication 15 MILLILITER(S): at 14:28

## 2022-01-01 RX ADMIN — Medication 5 MILLIGRAM(S): at 17:06

## 2022-01-01 RX ADMIN — Medication 667 MILLIGRAM(S): at 13:05

## 2022-01-01 RX ADMIN — Medication 102 MILLIGRAM(S): at 17:25

## 2022-01-01 RX ADMIN — Medication 50 MILLIGRAM(S): at 05:08

## 2022-01-01 RX ADMIN — Medication 40 MILLIGRAM(S): at 04:55

## 2022-01-01 RX ADMIN — Medication 40 MILLIEQUIVALENT(S): at 22:40

## 2022-01-01 RX ADMIN — HEPARIN SODIUM 1000 UNIT(S)/HR: 5000 INJECTION INTRAVENOUS; SUBCUTANEOUS at 20:38

## 2022-01-01 RX ADMIN — Medication 100 MILLIGRAM(S): at 05:57

## 2022-01-01 RX ADMIN — VENETOCLAX 20 MILLIGRAM(S): 100 TABLET, FILM COATED ORAL at 17:10

## 2022-01-01 RX ADMIN — CHLORHEXIDINE GLUCONATE 1 APPLICATION(S): 213 SOLUTION TOPICAL at 09:18

## 2022-01-01 RX ADMIN — Medication 30 MILLILITER(S): at 00:26

## 2022-01-01 RX ADMIN — Medication 15 MILLILITER(S): at 22:40

## 2022-01-01 RX ADMIN — Medication 50 MILLIGRAM(S): at 06:27

## 2022-01-01 RX ADMIN — ONDANSETRON 8 MILLIGRAM(S): 8 TABLET, FILM COATED ORAL at 11:37

## 2022-01-01 RX ADMIN — Medication 240 MILLIGRAM(S): at 05:29

## 2022-01-01 RX ADMIN — Medication 60 MILLIGRAM(S): at 14:00

## 2022-01-01 RX ADMIN — Medication 15 MILLILITER(S): at 06:27

## 2022-01-01 RX ADMIN — HEPARIN SODIUM 1100 UNIT(S)/HR: 5000 INJECTION INTRAVENOUS; SUBCUTANEOUS at 02:13

## 2022-01-01 RX ADMIN — Medication 15 MILLILITER(S): at 05:07

## 2022-01-01 RX ADMIN — Medication 5 UNIT(S): at 09:41

## 2022-01-01 RX ADMIN — SODIUM CHLORIDE 500 MILLILITER(S): 9 INJECTION INTRAMUSCULAR; INTRAVENOUS; SUBCUTANEOUS at 13:47

## 2022-01-01 RX ADMIN — HEPARIN SODIUM 900 UNIT(S)/HR: 5000 INJECTION INTRAVENOUS; SUBCUTANEOUS at 18:45

## 2022-01-01 RX ADMIN — HEPARIN SODIUM 0 UNIT(S)/HR: 5000 INJECTION INTRAVENOUS; SUBCUTANEOUS at 07:08

## 2022-01-01 RX ADMIN — PANTOPRAZOLE SODIUM 40 MILLIGRAM(S): 20 TABLET, DELAYED RELEASE ORAL at 05:55

## 2022-01-01 RX ADMIN — Medication 5 MILLIGRAM(S): at 20:23

## 2022-01-01 RX ADMIN — Medication 667 MILLIGRAM(S): at 07:56

## 2022-01-01 RX ADMIN — CHLORHEXIDINE GLUCONATE 1 APPLICATION(S): 213 SOLUTION TOPICAL at 08:46

## 2022-01-01 RX ADMIN — Medication 240 MILLIGRAM(S): at 05:19

## 2022-01-01 RX ADMIN — HYDROXYUREA 1000 MILLIGRAM(S): 500 CAPSULE ORAL at 23:54

## 2022-01-01 RX ADMIN — Medication 50 MILLIGRAM(S): at 05:29

## 2022-01-01 RX ADMIN — Medication 1000 MILLIGRAM(S): at 01:20

## 2022-01-01 RX ADMIN — Medication 667 MILLIGRAM(S): at 22:50

## 2022-01-01 RX ADMIN — Medication 100 MILLIGRAM(S): at 13:05

## 2022-01-01 RX ADMIN — CHLORHEXIDINE GLUCONATE 1 APPLICATION(S): 213 SOLUTION TOPICAL at 13:18

## 2022-01-01 RX ADMIN — Medication 100 MILLIGRAM(S): at 20:46

## 2022-01-01 RX ADMIN — Medication 125 MILLILITER(S): at 03:45

## 2022-01-01 RX ADMIN — Medication 15 MILLILITER(S): at 14:56

## 2022-01-01 RX ADMIN — Medication 100 MILLIGRAM(S): at 14:49

## 2022-01-01 RX ADMIN — HYDROXYUREA 1000 MILLIGRAM(S): 500 CAPSULE ORAL at 09:57

## 2022-01-01 RX ADMIN — Medication 3 UNIT(S): at 13:56

## 2022-01-01 RX ADMIN — HYDROXYUREA 1000 MILLIGRAM(S): 500 CAPSULE ORAL at 21:01

## 2022-01-01 RX ADMIN — POTASSIUM PHOSPHATE, MONOBASIC POTASSIUM PHOSPHATE, DIBASIC 83.33 MILLIMOLE(S): 236; 224 INJECTION, SOLUTION INTRAVENOUS at 10:18

## 2022-01-01 RX ADMIN — Medication 4: at 18:29

## 2022-01-01 RX ADMIN — Medication 100 GRAM(S): at 00:35

## 2022-01-01 RX ADMIN — MIDAZOLAM HYDROCHLORIDE 4 MILLIGRAM(S): 1 INJECTION, SOLUTION INTRAMUSCULAR; INTRAVENOUS at 13:50

## 2022-01-01 RX ADMIN — Medication 5 MILLIGRAM(S): at 12:18

## 2022-01-01 RX ADMIN — FAMOTIDINE 20 MILLIGRAM(S): 10 INJECTION INTRAVENOUS at 11:26

## 2022-01-01 RX ADMIN — Medication 667 MILLIGRAM(S): at 22:10

## 2022-01-01 RX ADMIN — DECITABINE 57 MILLIGRAM(S): 50 INJECTION, POWDER, LYOPHILIZED, FOR SOLUTION INTRAVENOUS at 11:33

## 2022-01-01 RX ADMIN — Medication 667 MILLIGRAM(S): at 11:59

## 2022-01-01 RX ADMIN — Medication 50 MILLIEQUIVALENT(S): at 14:47

## 2022-01-01 RX ADMIN — HEPARIN SODIUM 1400 UNIT(S)/HR: 5000 INJECTION INTRAVENOUS; SUBCUTANEOUS at 07:21

## 2022-01-01 RX ADMIN — HYDROXYUREA 1000 MILLIGRAM(S): 500 CAPSULE ORAL at 09:55

## 2022-01-01 RX ADMIN — Medication 25 GRAM(S): at 09:06

## 2022-01-01 RX ADMIN — SODIUM CHLORIDE 50 MILLILITER(S): 9 INJECTION INTRAMUSCULAR; INTRAVENOUS; SUBCUTANEOUS at 19:07

## 2022-01-01 RX ADMIN — Medication 667 MILLIGRAM(S): at 17:51

## 2022-01-01 RX ADMIN — Medication 3 UNIT(S): at 12:53

## 2022-01-01 RX ADMIN — INSULIN GLARGINE 15 UNIT(S): 100 INJECTION, SOLUTION SUBCUTANEOUS at 21:49

## 2022-01-01 RX ADMIN — ONDANSETRON 8 MILLIGRAM(S): 8 TABLET, FILM COATED ORAL at 11:58

## 2022-01-01 RX ADMIN — DECITABINE 57 MILLIGRAM(S): 50 INJECTION, POWDER, LYOPHILIZED, FOR SOLUTION INTRAVENOUS at 14:26

## 2022-01-01 RX ADMIN — HEPARIN SODIUM 0 UNIT(S)/HR: 5000 INJECTION INTRAVENOUS; SUBCUTANEOUS at 17:43

## 2022-01-01 RX ADMIN — ONDANSETRON 8 MILLIGRAM(S): 8 TABLET, FILM COATED ORAL at 11:32

## 2022-01-01 RX ADMIN — Medication 4: at 12:16

## 2022-01-01 RX ADMIN — SODIUM CHLORIDE 50 MILLILITER(S): 9 INJECTION INTRAMUSCULAR; INTRAVENOUS; SUBCUTANEOUS at 06:19

## 2022-01-01 RX ADMIN — ONDANSETRON 8 MILLIGRAM(S): 8 TABLET, FILM COATED ORAL at 12:31

## 2022-01-01 RX ADMIN — Medication 100 MILLIGRAM(S): at 22:01

## 2022-01-01 RX ADMIN — Medication 4: at 06:00

## 2022-01-01 RX ADMIN — PANTOPRAZOLE SODIUM 40 MILLIGRAM(S): 20 TABLET, DELAYED RELEASE ORAL at 13:52

## 2022-01-01 RX ADMIN — HEPARIN SODIUM 0 UNIT(S)/HR: 5000 INJECTION INTRAVENOUS; SUBCUTANEOUS at 05:08

## 2022-01-01 RX ADMIN — HEPARIN SODIUM 800 UNIT(S)/HR: 5000 INJECTION INTRAVENOUS; SUBCUTANEOUS at 22:51

## 2022-01-01 RX ADMIN — SODIUM CHLORIDE 50 MILLILITER(S): 9 INJECTION INTRAMUSCULAR; INTRAVENOUS; SUBCUTANEOUS at 07:20

## 2022-01-01 RX ADMIN — DECITABINE 57 MILLIGRAM(S): 50 INJECTION, POWDER, LYOPHILIZED, FOR SOLUTION INTRAVENOUS at 10:28

## 2022-01-01 RX ADMIN — Medication 40 MILLIEQUIVALENT(S): at 15:38

## 2022-01-01 RX ADMIN — HEPARIN SODIUM 200 UNIT(S)/HR: 5000 INJECTION INTRAVENOUS; SUBCUTANEOUS at 07:20

## 2022-01-01 RX ADMIN — Medication 650 MILLIGRAM(S): at 01:12

## 2022-01-01 RX ADMIN — Medication 20 MILLIEQUIVALENT(S): at 11:14

## 2022-01-01 RX ADMIN — Medication 1: at 21:43

## 2022-01-01 RX ADMIN — Medication 600 MILLIGRAM(S): at 17:24

## 2022-01-01 RX ADMIN — Medication 650 MILLIGRAM(S): at 12:31

## 2022-01-01 RX ADMIN — Medication 240 MILLIGRAM(S): at 05:59

## 2022-01-01 RX ADMIN — DECITABINE 57 MILLIGRAM(S): 50 INJECTION, POWDER, LYOPHILIZED, FOR SOLUTION INTRAVENOUS at 11:35

## 2022-01-01 RX ADMIN — Medication 10: at 15:10

## 2022-01-01 RX ADMIN — Medication 15 MILLILITER(S): at 14:50

## 2022-01-01 RX ADMIN — SODIUM CHLORIDE 50 MILLILITER(S): 9 INJECTION INTRAMUSCULAR; INTRAVENOUS; SUBCUTANEOUS at 07:30

## 2022-01-01 RX ADMIN — Medication 100 MILLIGRAM(S): at 22:10

## 2022-01-01 RX ADMIN — HEPARIN SODIUM 1200 UNIT(S)/HR: 5000 INJECTION INTRAVENOUS; SUBCUTANEOUS at 10:18

## 2022-01-01 RX ADMIN — POSACONAZOLE 300 MILLIGRAM(S): 100 TABLET, DELAYED RELEASE ORAL at 11:26

## 2022-01-01 RX ADMIN — Medication 20 MILLIGRAM(S): at 20:09

## 2022-01-01 RX ADMIN — VENETOCLAX 20 MILLIGRAM(S): 100 TABLET, FILM COATED ORAL at 13:17

## 2022-01-01 RX ADMIN — Medication 15 GRAM(S): at 23:07

## 2022-01-01 RX ADMIN — Medication 600 MILLIGRAM(S): at 05:28

## 2022-01-01 RX ADMIN — SODIUM CHLORIDE 50 MILLILITER(S): 9 INJECTION INTRAMUSCULAR; INTRAVENOUS; SUBCUTANEOUS at 07:10

## 2022-01-01 RX ADMIN — Medication 30 MILLILITER(S): at 10:47

## 2022-01-01 RX ADMIN — HEPARIN SODIUM 1400 UNIT(S)/HR: 5000 INJECTION INTRAVENOUS; SUBCUTANEOUS at 02:49

## 2022-01-01 RX ADMIN — Medication 3: at 21:49

## 2022-01-01 RX ADMIN — Medication 20 MILLIEQUIVALENT(S): at 18:09

## 2022-01-01 RX ADMIN — Medication 100 MILLIGRAM(S): at 14:56

## 2022-01-01 RX ADMIN — FAMOTIDINE 20 MILLIGRAM(S): 10 INJECTION INTRAVENOUS at 12:17

## 2022-01-01 RX ADMIN — Medication 400 MILLIGRAM(S): at 23:45

## 2022-01-01 RX ADMIN — POSACONAZOLE 300 MILLIGRAM(S): 100 TABLET, DELAYED RELEASE ORAL at 11:36

## 2022-01-01 RX ADMIN — Medication 100 MILLIGRAM(S): at 22:40

## 2022-01-01 RX ADMIN — Medication 667 MILLIGRAM(S): at 08:19

## 2022-01-01 RX ADMIN — HEPARIN SODIUM 200 UNIT(S)/HR: 5000 INJECTION INTRAVENOUS; SUBCUTANEOUS at 19:15

## 2022-01-01 RX ADMIN — Medication 100 MILLIGRAM(S): at 05:07

## 2022-01-01 RX ADMIN — Medication 650 MILLIGRAM(S): at 01:45

## 2022-01-01 RX ADMIN — Medication 4: at 18:44

## 2022-01-01 RX ADMIN — Medication 240 MILLIGRAM(S): at 06:20

## 2022-01-01 RX ADMIN — Medication 30 MILLILITER(S): at 21:01

## 2022-01-01 RX ADMIN — Medication 100 MILLIEQUIVALENT(S): at 19:19

## 2022-01-01 RX ADMIN — Medication 4: at 14:35

## 2022-01-01 RX ADMIN — Medication 20 MILLIEQUIVALENT(S): at 15:43

## 2022-01-01 RX ADMIN — BUMETANIDE 1 MILLIGRAM(S): 0.25 INJECTION INTRAMUSCULAR; INTRAVENOUS at 02:29

## 2022-01-01 RX ADMIN — Medication 667 MILLIGRAM(S): at 17:43

## 2022-01-01 RX ADMIN — HEPARIN SODIUM 1300 UNIT(S)/HR: 5000 INJECTION INTRAVENOUS; SUBCUTANEOUS at 13:33

## 2022-04-12 NOTE — CHART NOTE - NSCHARTNOTEFT_GEN_A_CORE
: Chilango Wilkerson MD    INDICATION: Tachycardia, blast crisis and need for aggressive hydration    PROCEDURE:  [x ] LIMITED ECHO  [x ] LIMITED CHEST  [ ] LIMITED RETROPERITONEAL  [ ] LIMITED ABDOMINAL  [ ] LIMITED DVT  [x ] NEEDLE GUIDANCE VASCULAR  [ ] NEEDLE GUIDANCE THORACENTESIS  [ ] NEEDLE GUIDANCE PARACENTESIS  [ ] NEEDLE GUIDANCE PERICARDIOCENTESIS  [ ] OTHER    FINDINGS:  Echo: Technically difficult study        LVSF: No segmental wall motion abnormalities       EF: Grossly mild decreased; LVOT VTI average 15cm in setting of afib       RVSF: Grossly preserved; unable to obtain TAPSE       LA: Grossly wnl       RA: Grossly wnl       Mitral Valve: Unable to assess       Aortic Valve: Unable to assess       Tricuspid Valve: Unable to assess       Pulmonic Valve: Unable to assess       Pericardium: No pericardial effusion  IVC: 2.1cm with 21% respiratory variation  Chest:        A-line predominant bilaterally at the apex with scattered B-lines       B-line predominant bilaterally at the bases       Small areas of atelectasis at the bases bilaterally       No consolidations       No pericardial effusions bilaterally      INTERPRETATION:  - Mildly decreased LV systolic function  - Large IVC with minimal respiratory variation  - A-line predominant aeration pattern at the apex with bilateral B-lines at the bases bilaterally    Chilango Wilkerson MD  Critical Care Fellow  PGY-6  MICU Spectra: [phone]

## 2022-04-12 NOTE — CONSULT NOTE ADULT - ATTENDING COMMENTS
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   WBC elevated at 231k, with 88% blasts.   Patient is anemic with Hgb of 6.8 and thrombocytopenic with platelet count of 59k.  Peripheral smear was reviewed which found large quantity of blast cells and lymphocytes, no Antoni rods.   Given her sx and elevated wbc, rec urgent inpatient treatment.  Flow cytometry was sent off by Dr. Chua for evaluation of acute leukemia.   Rec hydrea 1000mg now and continue q8h.  Rec monitor cbc with differential daily and TLS labs  Plan for BM biopsy

## 2022-04-12 NOTE — ED ADULT TRIAGE NOTE - BEFAST FACE
Wound Evaluated By: EMILIO Add [id number] Cpt? (Important Note: In 2017 The Use Of 45716 Is Being Tracked By Cms To Determine Future Global Period Reimbursement For Global Periods): no Detail Level: Detailed No

## 2022-04-12 NOTE — ED PROVIDER NOTE - CLINICAL SUMMARY MEDICAL DECISION MAKING FREE TEXT BOX
89 y/o female with PMHx of HTN who presented to the ED for cough and weakness X 3 wks. \  Concern for anemia, dehydration, renal insufficiency, electrolyte abnormality, A-fib  Labs, CXR, EKG, IVF

## 2022-04-12 NOTE — H&P ADULT - NSHPREVIEWOFSYSTEMS_GEN_ALL_CORE
REVIEW OF SYSTEMS:  CONSTITUTIONAL: No fever, chills, night sweats, or fatigue  EYES: No eye pain, visual disturbances, or discharge  ENMT: No difficulty hearing, tinnitus, vertigo; No sinus or throat pain  NECK: No pain or stiffness  RESPIRATORY: +cough. No wheezing, or hemoptysis; No shortness of breath  CARDIOVASCULAR: +palpitations, dizziness, LLE swelling. No chest pain  GASTROINTESTINAL: +constipation. No abdominal or epigastric pain. No nausea, vomiting, or hematemesis; No melena or hematochezia.  GENITOURINARY: No dysuria, frequency, hematuria, or incontinence  NEUROLOGICAL: No headaches, memory loss, loss of strength, numbness, or tremors  SKIN: No itching, burning, rashes, or lesions   LYMPH NODES: No enlarged glands  ENDOCRINE: No heat or cold intolerance; No hair loss  MUSCULOSKELETAL: No joint pain or swelling; No muscle, back, or extremity pain  PSYCHIATRIC: No depression, anxiety, mood swings, or difficulty sleeping  HEME/LYMPH: No easy bruising, or bleeding gums  ALLERGY AND IMMUNOLOGIC: No hives or eczema

## 2022-04-12 NOTE — ED ADULT NURSE NOTE - CHIEF COMPLAINT QUOTE
Pt c/o weakness, faintness, cough, heart racing x2 weeks.  Saw MD and was told she has a cold but symptoms are getting worse.  Was given antibiotics with no relief

## 2022-04-12 NOTE — H&P ADULT - ASSESSMENT
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  89F with HTN, T2DM admitted to MICU for new acute blast crisis requiring leukophoresis.     # NEURO  AOx4 at baseline, mentating well currently  - Monitor per ICU protocol  - Delirium precautions    # CARDIAC  Afib in RVR, hemodynamically stable. Unclear if new or chronic   - Goal HR rate low 100s  - c/w home verapamil 240 mg daily, metoprolol succinate 25 mg daily  - labetalol 5 IV PRN for HR > 130  - CHADsVASC at least 5, would benefit from AC when stable/no planned procedures  - f/u trop trend  - needs baseline echo    HTN   - c/w home hydrochlorothiazide 25 mg daily    # PULM  No active pulmonary issues; will continue to monitor  - Concern for PE given tachycardia  - F/u CTA    # RENAL  No active renal issues; will need at least daily monitoring of Cr and TLS labs  - Strict I&Os while receiving fluids, monitor for oliguria and fluid overload    Hyperuricemia  - monitor w/ TLS labs  - rasburicase 3 mg x 1 per heme   - hydroxyurea 1000 mg q8 hrs per heme    # GI   CC diet    # HEME/ONC  Acute blast crisis, new dx of leukemia, now with WBC 230s  - Leukophoresis indicated; will contact blood bank after shiley placed  - TLS labs (CMP, phos, uric acid, LDH) q8 per heme  - LR @ 150/hr, monitor for fluid overload  - F/u G6PD, hepatitis panel, hep b core total  - Keep fibrinogen > 150, plt > 50    # ID  No obvious infection but now is immunocompromised  - F/u UA  - Monitor off abx; broad spectrum if febrile    # ENDO  T2DM, ?not on any meds at home  - TORRIE, FS qac, qhs  - F/u a1c in AM    # VASC  C/f DVT LLE  - f/u duplex    DVT - SCD R leg for now 89F with HTN, T2DM admitted to MICU for new acute blast crisis requiring leukophoresis.     # NEURO  AOx4 at baseline, mentating well currently  - Monitor per ICU protocol  - Delirium precautions    # CARDIAC  Afib in RVR, hemodynamically stable. Unclear if new or chronic   - Goal HR rate low 100s  - c/w home verapamil 240 mg daily, metoprolol succinate 25 mg daily  - labetalol 5 IV PRN for HR > 130  - CHADsVASC at least 5, would benefit from AC when stable/no planned procedures  - f/u trop trend  - needs baseline echo    HTN   - c/w home hydrochlorothiazide 25 mg daily    # PULM  No active pulmonary issues; will continue to monitor  - Concern for PE given tachycardia  - F/u CTA    # RENAL  No active renal issues; will need at least daily monitoring of Cr and TLS labs  - Strict I&Os while receiving fluids, monitor for oliguria and fluid overload    Hyperuricemia  - monitor w/ TLS labs  - rasburicase 3 mg x 1 per heme   - hydroxyurea 1000 mg q8 hrs per heme    # GI   CC diet    # HEME/ONC  Acute blast crisis, new dx of leukemia, now with WBC 230s  - Leukophoresis indicated; will contact blood bank after shiley placed  - TLS labs (CMP, phos, uric acid, LDH) q8 per heme  - LR @ 150/hr, monitor for fluid overload  - F/u G6PD, hepatitis panel, hep b core total  - Keep fibrinogen > 150, plt > 50    # ID  No obvious infection but now is immunocompromised  - F/u UA  - Monitor off abx; broad spectrum if febrile    # ENDO  T2DM, ?not on any meds at home  - TORRIE, FS qac, qhs  - F/u a1c in AM    # VASC  C/f DVT LLE  - f/u duplex    # ETHICS  - Spoke with patient at length to determine her goals. She values quantity of life, and therefore would want all medical interventions including intubation and resuscitation. In terms of her suspected leukemia, she wants all available treatments including invasive procedures such as a shiley and chemotherapy if indicated.   - She designated her daughter Cheyenne Harrell [phone] as her health care proxy in the event that she is unable to make her own medical decisions.     DVT - SCD R leg for now, f/u duplex, CTA 89F with HTN, T2DM admitted to MICU for new acute blast crisis requiring leukophoresis.     # NEURO  AOx4 at baseline, mentating well currently  - Monitor per ICU protocol  - Delirium precautions    # CARDIAC  Afib in RVR, hemodynamically stable. Unclear if new or chronic   - Goal HR rate low 100s  - c/w home verapamil 240 mg daily, metoprolol succinate 25 mg daily  - labetalol 5 IV PRN for HR > 130  - CHADsVASC at least 5, would benefit from AC when stable/no planned procedures  - f/u trop trend  - needs baseline echo    HTN   - c/w home hydrochlorothiazide 25 mg daily    # PULM  No active pulmonary issues; will continue to monitor  - Concern for PE given tachycardia  - F/u CTA    # RENAL  No active renal issues; will need at least daily monitoring of Cr and TLS labs  - Strict I&Os while receiving fluids, monitor for oliguria and fluid overload    Hyperuricemia  - monitor w/ TLS labs. 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  - Leukophoresis indicated; will contact blood bank after shiley placed  - TLS labs (CMP, phos, uric acid, LDH) q8 per heme  - LR @ 150/hr, monitor for fluid overload  - F/u G6PD, hepatitis panel, hep b core total  - Keep fibrinogen > 150, plt > 50    # ID  No obvious infection but now is immunocompromised  - F/u UA  - Monitor off abx; broad spectrum if febrile    # ENDO  T2DM, supposed to be on metformin at home but nonadherent  - TORRIE, FS qac, qhs   - F/u a1c in AM    # VASC  C/f DVT LLE  - f/u duplex    # ETHICS  - Spoke with patient at length to determine her goals. She values quantity of life, and therefore would want all medical interventions including intubation and resuscitation. In terms of her suspected leukemia, she wants all available treatments including invasive procedures such as a shiley and chemotherapy if indicated.   - She designated her daughter Cheyenne Harrell [phone] as her health care proxy in the event that she is unable to make her own medical decisions.     DVT - SCD R leg for now, f/u duplex, CTA 89F with HTN, T2DM admitted to MICU for new acute blast crisis requiring leukophoresis.     # NEURO  AOx4 at baseline, mentating well currently  - Monitor per ICU protocol  - Delirium precautions    # CARDIAC  Afib in RVR, hemodynamically stable. Unclear if new or chronic   - Goal HR rate low 100s  - c/w home verapamil 240 mg daily, metoprolol succinate 25 mg daily  - labetalol 5 IV PRN for HR > 130  - CHADsVASC at least 5, would benefit from AC when stable/no planned procedures  - f/u trop trend  - needs baseline echo    HTN   - monitor BP and if she continues to be hypertensive, start home hydrochlorothiazide 25 mg daily    # PULM  No active pulmonary issues; will continue to monitor  - CTA 4/12 with R subsegmental PE, will AC for min. 3 months    # RENAL  No active renal issues; will need at least daily monitoring of Cr and TLS labs  - Strict I&Os while receiving fluids, monitor for oliguria and fluid overload    Hyperuricemia  - monitor w/ TLS labs. 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  - Leukophoresis indicated; will contact blood bank after shiley placed  - TLS labs (CMP, phos, uric acid, LDH) q8 and DIC labs (fibrinogen and d-dimer) q8 per heme   - LR @ 100/hr monitor for fluid overload; can give lasix prn to maintain euvolemia  - F/u G6PD, hepatitis panel, hep b core total  - Keep fibrinogen > 150, plt > 50  - Treat hyperuricemia as above    # ID  No obvious infection but now is immunocompromised  - F/u UA  - Monitor off abx; broad spectrum if febrile    # ENDO  T2DM, supposed to be on metformin at home but nonadherent  - TORRIE, FS qac, qhs   - F/u a1c in AM    # VASC  C/f DVT LLE  - f/u duplex    # ETHICS  - Spoke with patient at length to determine her goals. She values quantity of life, and therefore would want all medical interventions including intubation and resuscitation. In terms of her suspected leukemia, she wants all available treatments including invasive procedures such as a shiley and chemotherapy if indicated.   - She designated her daughter Cheyenne Harrell [phone] as her health care proxy in the event that she is unable to make her own medical decisions.     DVT - SCD R leg for now, f/u duplex, CTA 89F with HTN, T2DM admitted to MICU for new acute blast crisis requiring leukophoresis.     # NEURO  AOx4 at baseline, mentating well currently  - Monitor per ICU protocol  - Delirium precautions    # CARDIAC  Afib in RVR, hemodynamically stable. Unclear if new or chronic   - Goal HR rate low 100s  - c/w home verapamil 240 mg daily, metoprolol succinate 25 mg daily  - labetalol 5 IV PRN for HR > 130  - CHADsVASC at least 5, would benefit from AC when stable/no planned procedures  - f/u trop trend  - needs baseline echo    HTN   - monitor BP and if she continues to be hypertensive, start home hydrochlorothiazide 25 mg daily    # PULM  No active pulmonary issues; will continue to monitor  - CTA 4/12 with R subsegmental PE, will AC for min. 3 months    # RENAL  No active renal issues; will need at least daily monitoring of Cr and TLS labs  - Strict I&Os while receiving fluids, monitor for oliguria and fluid overload    Hyperuricemia  - monitor w/ TLS labs. 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  - Leukophoresis indicated; will contact blood bank after shiley placed  - TLS labs (CMP, phos, uric acid, LDH) q8 and DIC labs (fibrinogen and d-dimer) q8 per heme   - LR @ 100/hr monitor for fluid overload; can give lasix prn to maintain euvolemia  - F/u G6PD, hepatitis panel, hep b core total  - Keep fibrinogen > 150, plt > 50  - Treat hyperuricemia as above    Anemia  - 2/2 acute leukemia  - maintain active T&S  - transfuse hgb > 7  - receiving 1u prbc 4/12    # ID  No obvious infection but now is immunocompromised  - F/u UA  - Monitor off abx; broad spectrum if febrile    # ENDO  T2DM, supposed to be on metformin at home but nonadherent  - TORRIE, FS qac, qhs   - F/u a1c in AM    # VASC  C/f DVT LLE  - f/u duplex    # ETHICS  - Spoke with patient at length to determine her goals. She values quantity of life, and therefore would want all medical interventions including intubation and resuscitation. In terms of her suspected leukemia, she wants all available treatments including invasive procedures such as a shiley and chemotherapy if indicated.   - She designated her daughter Cheyenne Harrell [phone] as her health care proxy in the event that she is unable to make her own medical decisions.     DVT - SCD R leg for now, f/u duplex, CTA

## 2022-04-12 NOTE — ED ADULT NURSE NOTE - OBJECTIVE STATEMENT
Pt to bed 21. Alert and oriented x 3. Pt states that she has been feeling unwell and having palpitations for a few days. Pt noted to be tachycardic. Pt afebrile rectally. Pt hooked p to CM. IVL placed. Bloods drawn. Will continue to monitor.

## 2022-04-12 NOTE — H&P ADULT - NSHPPHYSICALEXAM_GEN_ALL_CORE
Vital Signs Last 24 Hrs  T(C): 36.7 (12 Apr 2022 15:38), Max: 36.7 (12 Apr 2022 15:38)  T(F): 98.1 (12 Apr 2022 15:38), Max: 98.1 (12 Apr 2022 15:38)  HR: 152 (12 Apr 2022 15:38) (152 - 160)  BP: 172/75 (12 Apr 2022 15:38) (152/60 - 172/75)  RR: 24 (12 Apr 2022 15:38) (24 - 24)  SpO2: 100% (12 Apr 2022 15:38) (98% - 100%)    GENERAL: NAD, elderly lady sitting semi supine in bed  HEAD: NCAT  EYES: EOMI, PERRL, conjunctiva and sclera clear  NECK: Supple, No JVD  CHEST/LUNG: bibasilar crackles, clear to auscultation otherwise  HEART: very tachycardic, s1, s2, no m/r/g appreciated  ABDOMEN: Soft, Nontender, Nondistended; Bowel sounds present  EXTREMITIES:  2+ Peripheral Pulses, No clubbing, cyanosis, or edema, LLE trace pitting edema, slightly larger in diameter than R (new per patient and grand daughter)  PSYCH: AAOx3, appropriate affect  NEUROLOGY: non-focal, smyth  SKIN: No rashes or lesions

## 2022-04-12 NOTE — ED PROVIDER NOTE - OBJECTIVE STATEMENT
88 y/o female with PMHx of HTN who presented to the ED for cough and weakness X 3 wks. Pt states over the past 3 wks having a cough with weakness. Pt admits to some palpitations and noted her HR and FS were elevated. Pt was seen by PMD who felt this was likely a URI. Pt continued to have cough with weakness and HR in 140s. Pt denies any fever, chills, SOB, chest pain, or abd pain. Denies any blood in stool or urine.

## 2022-04-12 NOTE — H&P ADULT - NSHPLABSRESULTS_GEN_ALL_CORE
LABS:                          6.8    231.30 )-----------( 59       ( 12 Apr 2022 13:45 )             20.8     04-12    139  |  100  |  16  ----------------------------<  343<H>  2.4<LL>   |  24  |  0.85    Ca    9.5      12 Apr 2022 13:45    TPro  7.3  /  Alb  3.5  /  TBili  0.8  /  DBili  x   /  AST  34<H>  /  ALT  17  /  AlkPhos  88  04-12    LIVER FUNCTIONS - ( 12 Apr 2022 13:45 )  Alb: 3.5 g/dL / Pro: 7.3 g/dL / ALK PHOS: 88 U/L / ALT: 17 U/L / AST: 34 U/L / GGT: x           PT/INR - ( 12 Apr 2022 13:45 )   PT: 16.4 sec;   INR: 1.41 ratio         PTT - ( 12 Apr 2022 13:45 )  PTT:26.1 sec

## 2022-04-12 NOTE — ED PROVIDER NOTE - ATTENDING CONTRIBUTION TO CARE
Pt was seen and evaluated by me. Pt is a 89 y/o female with PMHx of HTN who presented to the ED for cough and weakness X 3 wks. Pt states over the past 3 wks having a cough with weakness. Pt admits to some palpitations and noted her HR and FS were elevated. Pt was seen by PMD who felt this was likely a URI. Pt continued to have cough with weakness and HR in 140s. Pt denies any fever, chills, SOB, chest pain, or abd pain. Denies any blood in stool or urine.   VITALS: Vitals have been reviewed.  GEN APPEARANCE: WDWN, alert and cooperative, non-toxic appearing and in NAD  HEAD: Atraumatic, normocephalic.   EYES: PERRL, EOMI.   EARS: Gross hearing intact.   NOSE: No nasal discharge.   THROAT: MMM. Oral cavity and pharynx normal.   NECK: Supple  CV: Tachy, irregularly irregular. No cyanosis or pallor. Extremities warm, well perfused. Cap refill <2 seconds.   LUNGS: CTAB. No wheezing. No rales. No rhonchi. No diminished breath sounds.   ABDOMEN: Soft, NTND. No guarding or rebound.   MSK/EXT: Spine appears normal, no spine point tenderness.   Pelvis: Stable. No obvious joint or bony deformity, no peripheral edema.   NEURO: Alert, follows commands. Speech normal. Sensation and motor normal x4 extremities.   SKIN: Normal color for race, warm, dry and intact. No evidence of rash.  PSYCH: Normal mood and affect.  89 y/o female with PMHx of HTN who presented to the ED for cough and weakness X 3 wks.   Concern for anemia, dehydration, renal insufficiency, electrolyte abnormality, A-fib  Labs, CXR, EKG, IVF

## 2022-04-12 NOTE — H&P ADULT - NSICDXPASTMEDICALHX_GEN_ALL_CORE_FT
PAST MEDICAL HISTORY:  Chronic atrial fibrillation     Diabetes mellitus     H/O: HTN (hypertension)

## 2022-04-12 NOTE — H&P ADULT - HISTORY OF PRESENT ILLNESS
89 y/o female with PMHx of hypertension and type 2 diabetes mellitus who presented to the ED for cough and weakness for 3 weeks. She endorses palpitations and noted her heart rate and blood glucose levels were elevated. Pt was seen by PMD who felt this was likely a URI. Pt continued to have cough with weakness and HR in 140s. Pt denies any fever, chills, SOB, chest pain, or abd pain. Denies any blood in stool or urine. 89F w/ T2DM, HTN, afib presents to the ED for palpitations x 3 days and weakness x 1 day. She felt very weak and lightheaded while standing today, feeling like she would pass out so this prompted her to come to the ED. Recently she also had a cough x 3 weeks. She was seen by PMD who felt this was likely a URI. Pt continued to have cough with weakness and HR in 140s. Pt denies any fever, chills, SOB, chest pain, or abd pain. Denies any blood in stool or urine. 89F w/ T2DM, HTN presents to the ED for palpitations x 3 days and weakness x 1 day. She felt very weak and lightheaded while standing today, feeling like she would pass out so this prompted her to come to the ED. Recently she also had a cough x 3 weeks. She was seen by PMD who felt this was likely a URI. Pt continued to have cough with weakness and HR in 140s. Pt denies any fever, chills, SOB, chest pain, or abd pain. Denies any blood in stool or urine. 89F w/ T2DM, HTN presents to the ED for palpitations x 3 days and weakness x 1 day. She felt very weak and lightheaded while standing today, feeling like she would pass out so this prompted her to come to the ED. Recently she also had a cough x 3 weeks. She was seen by PMD who felt this was likely a URI. Pt continued to have cough with weakness and HR in 140s. Pt denies any fever, chills, SOB, chest pain, or abd pain. She did not take her medications this morning because she was feeling unwell. She also does not take her metformin regularly.     In ED, pt afib rvr HR 160s, satting well on RA, BP hypertensive, RR 14. Labs significant for + with 88% blasts concerning for acute leukemia. Heme consulted in ED, plan for leukophoresis. MICU consulted and accepted patient for urgent leukophoresis.

## 2022-04-13 NOTE — PROCEDURE NOTE - ADDITIONAL PROCEDURE DETAILS
Target vessel identified via US. Target vessel easily compressible proximally and distally. Needle tip observed within lumen of target vessel. Catheter easily advanced into place with dark venous blood return.
Hematology/Oncology Procedure Note    Bone Marrow Aspiration/Biopsy    Indication: acute leukemia    Bone marrow aspiration and biopsy procedure description, risks, and benefits were discussed in detail with the patient.  All questions were answered.  Informed consent was obtained and time-out performed.      The area of the right posterior iliac crest was prepped and draped using sterile technique. Local anesthetic with 2% Lidocaine.    Bone marrow aspiration and biopsy  was performed using sterile technique by Dr. Yuki Harden with Dr. Ava Sneed assist and supervision. Specimens were obtained. No complications and less than 2 cc of blood loss.     The procedure was well tolerated and no local bleeding or other complications were observed.  Pressure was applied to the procedure site and a wound dressing was placed.  The patient and nursing staff were advised that the patient is to lie flat for 30 minutes post procedure and not to shower or change the dressing for 24 hours. Tylenol may be used if no contraindications for pain at the procedure site.    Please do not hesitate to page with questions.     Yuki Harden MD PGY4   [phone]  Hematology-Oncology Fellow

## 2022-04-13 NOTE — PROGRESS NOTE ADULT - ASSESSMENT
89F h/o HTN DM uterine ca s/p FRED and arthritis p/w generalied weakness and dyspnea x2wks found to have acute blast crisis requiring leukophoresis and afib w/ rvr.     # NEURO  AOx4 at baseline, mentating well currently  - Monitor per ICU protocol  - Delirium precautions    # CARDIAC  Afib in RVR, hemodynamically stable. Unclear if new or chronic   - Goal HR rate low 100s  - c/w home verapamil 240 mg daily, metoprolol succinate 25 mg daily  - labetalol 5 IV PRN for HR > 130  - CHADsVASC at least 5, would benefit from AC when stable/no planned procedures  - f/u trop trend  - needs baseline echo    HTN   - monitor BP and if she continues to be hypertensive, start home hydrochlorothiazide 25 mg daily    # PULM  No active pulmonary issues; will continue to monitor  - CTA 4/12 with R subsegmental PE, will AC for min. 3 months    # RENAL  No active renal issues; will need at least daily monitoring of Cr and TLS labs  - Strict I&Os while receiving fluids, monitor for oliguria and fluid overload    Hyperuricemia  - monitor w/ TLS labs. 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  - Leukophoresis indicated; will contact blood bank after shiley placed  - TLS labs (CMP, phos, uric acid, LDH) q8 and DIC labs (fibrinogen and d-dimer) q8 per heme   - LR @ 100/hr monitor for fluid overload; can give lasix prn to maintain euvolemia  - F/u G6PD, hepatitis panel, hep b core total  - Keep fibrinogen > 150, plt > 50  - Treat hyperuricemia as above    Anemia  - 2/2 acute leukemia  - maintain active T&S  - transfuse hgb > 7, s/p 1 uprbc 4/12  - receiving 1u prbc 4/12    # ID  No obvious infection but now is immunocompromised  - F/u UA  - Monitor off abx; broad spectrum if febrile    # ENDO  T2DM, supposed to be on metformin at home but nonadherent  - TORRIE, FS qac, qhs   - F/u a1c in AM    # VASC  C/f DVT LLE  - f/u duplex    # ETHICS  - Spoke with patient at length to determine her goals. She values quantity of life, and therefore would want all medical interventions including intubation and resuscitation. In terms of her suspected leukemia, she wants all available treatments including invasive procedures such as a shiley and chemotherapy if indicated.   - She designated her daughter Cheyenne Harrell [phone] as her health care proxy in the event that she is unable to make her own medical decisions.     DVT - SCD R leg for now, f/u duplex, CTA    Lines: 4/12 CHIOMA Mascorro (4/12- )   89F h/o HTN DM uterine ca s/p FRED and arthritis p/w generalied weakness and dyspnea x2wks found to have acute blast crisis requiring leukophoresis and afib w/ rvr, and PE.     # NEURO  AOx4 at baseline, mentating well currently    # CARDIAC  Afib in RVR, hemodynamically stable  - Goal HR rate low 110s  - c/w home verapamil 240 mg daily  - On metoprolol 25 ER qd, started lopressor 50mg BID  - can use labetalol 5 IV PRN if HR>120  - CHADsVASC at least 5, started hep gtt  - Echo done 4/13/22, nl LVSF & RVSF    HTN   - takes hydrochlorothiazide 25 mg daily at home    # PULM  - CTA 4/12 with R subsegmental PE  - hep gtt for now    # RENAL  Hyperuricemia  - monitor w/ TLS labs. 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 Dexamethasone 10mg q12h.  - Pending transfer to [facility] [address], accepted by Dr. Eriberto Young    Anemia  - 2/2 acute leukemia  - maintain active T&S  - s/p 1 uprbc 4/12  - Transfuse if Hgb < 7.0.  Transfuse for platelet count < 50k    # ID  No obvious infection but now is immunocompromised  - Monitor off abx; broad spectrum if febrile    # ENDO  T2DM, supposed to be on metformin at home but nonadherent  - Hyperglycemia - takes long acting 15u qAM at home, started premeal 5U TID  - TORRIE, FS qac, qhs   - F/u a1c in AM    # VASC  C/f DVT LLE  - f/u duplex    # ETHICS  - She values quantity of life, and therefore would want all medical interventions including intubation and resuscitation. In terms of her suspected leukemia, she wants all available treatments including invasive procedures such as a shiley and chemotherapy if indicated.   - She designated her daughter Cheyenne Harrell [phone] as her health care proxy in the event that she is unable to make her own medical decisions.     DVT ppx - on hep gtt. f/u duplex,

## 2022-04-13 NOTE — PROGRESS NOTE ADULT - ASSESSMENT
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     #Acute Leukemia:   On admission, WBC elevated at 231k, with 88% blasts. Patient was anemic with Hgb of 6.8 and thrombocytopenic with platelet count of 59k.  Peripheral smear was reviewed.   Patient underwent leukopheresis on 4/13/22, and was started on hydrea 1000mg q8h.   Her WBC has improved to 66k. Continue hydrea 1000mg q8h for now.     -HIV negative.   -G6PD, hepatitis panel- hepatitis B surface Ab/surface Ag/core antibody total and hepatitis C are pending  -TTE pending    -Flow cytometry was sent off by Dr. Chua for evaluation of acute leukemia.  - FLT3 and BCR-ABL was ordered.  - Pathologist Dr. Chua identified Antoni rods, thus will start ATRA 40mg q12h.   Due to concern for possible differentiation syndrome given the patient's leukocytosis and suspected myeloid malignancy, will start dexamethasone 10mg q12h.    - Will perform BMBx today at bedside.     - Check CBC w/ DIFF Daily   - Maintain active type and screen  - Check: TLS labs and DIC labs q8h- CMP, Phos, LDH, uric acid, fibrinogen, D-dimer  - Transfuse if Hgb < 7.0.  Transfuse for platelet count < 50k pending result of PML-rob.  - Goal fibrinogen is over 150. If fibrinogen is under 150, please notify our team.     #Hyperuricemia:  Uric acid was elevated at 12.1 on admission. She was given rasburicase 3mgx1 and started on allopurinol 300mg PO qdaily.  Uric acid improved to 6.4 today. Continue allopurinol 300mg PO qdaily.  Continue IVNS at 100cc/hr and monitor volume status. Can give lasix PRN to keep the patient euvolemic.     Please do not hesitate to page with questions.     Yuki Harden MD PGY4   [phone]  Hematology-Oncology Fellow   This patient is an 88yo lady with PMH of htn and DM who presents to the ED with complaint of three weeks of progressive generalized weakness, found to have acute leukemia.     #Acute Leukemia:   On admission, WBC elevated at 231k, with 88% blasts. Patient was anemic with Hgb of 6.8 and thrombocytopenic with platelet count of 59k.  Peripheral smear was reviewed.   Patient underwent leukopheresis on 4/13/22, and was started on hydrea 1000mg q8h.   Her WBC has improved to 66k. Continue hydrea 1000mg q8h for now.     -Flow cytometry was sent off by Dr. Chua for evaluation of acute leukemia.  - FLT3 and BCR-ABL was ordered.  - Pathologist Dr. Chua identified Antoni rods, thus will start ATRA (tretinoin) 40mg q12h.   Due to concern for possible differentiation syndrome given the patient's leukocytosis and suspected myeloid malignancy, will start dexamethasone 10mg q12h.  Please adjust glycemic control based on fingerstick glucose measurements.   - Will perform BMBx today at bedside.   - Continue to check CBC w/ DIFF Daily   - Maintain active type and screen  - Check: TLS labs and DIC labs q8h- CMP, Phos, LDH, uric acid, fibrinogen, D-dimer   - Transfuse if Hgb < 7.0.  Transfuse for platelet count < 50k pending result of PML-rob.  - Goal fibrinogen is over 150. If fibrinogen is under 150, please notify our team.     Optimization  -HIV negative.   -G6PD, hepatitis panel- hepatitis B surface Ab/surface Ag/core antibody total and hepatitis C are pending  -TTE pending  - has shiley catheter which was placed on 4/12/22     #Hyperuricemia:  Uric acid was elevated at 12.1 on admission. She was given rasburicase 3mgx1 and started on allopurinol 300mg PO qdaily.  Uric acid improved to 6.4 today. Continue allopurinol 300mg PO qdaily.  Continue IVNS at 100cc/hr and monitor volume status. Can give lasix PRN to keep the patient euvolemic.     #Pulmonary embolism:  Will discuss!    Please do not hesitate to page with questions.     Yuki Harden MD PGY4   [phone]  Hematology-Oncology Fellow   This patient is an 89yo lady with PMH of htn and DM who presents to the ED with complaint of three weeks of progressive generalized weakness, found to have acute leukemia.     #Acute Leukemia:   On admission, WBC elevated at 231k, with 88% blasts. Patient was anemic with Hgb of 6.8 and thrombocytopenic with platelet count of 59k.  Peripheral smear was reviewed, which found lymphocytes, blasts, no Antoni rods.   Patient underwent leukopheresis on 4/13/22, and was started on hydrea 1000mg q8h.   Her WBC has improved to 66k. Continue hydrea 1000mg q8h for now.     -Flow cytometry was sent off by Dr. Chua for evaluation of acute leukemia.  - FLT3 and BCR-ABL was ordered.  - Pathologist Dr. Chua identified Antoni rods, thus will start ATRA (tretinoin) 40mg q12h.   Due to concern for possible differentiation syndrome given the patient's leukocytosis and suspected myeloid malignancy, will start dexamethasone 10mg q12h.  Please adjust glycemic control based on fingerstick glucose measurements.   - BMBx performed today at bedside.   - Continue to check CBC w/ DIFF and coags daily   - Maintain active type and screen  - Check: TLS labs and DIC labs q8h- CMP, Phos, LDH, uric acid, fibrinogen, D-dimer  - Transfuse if Hgb < 7.0.  Transfuse for platelet count < 50k pending result of PML-rob.  - Goal fibrinogen is over 150. If fibrinogen is under 150, please notify our team.     Optimization  -HIV negative.   -G6PD, hepatitis panel- hepatitis B surface Ab/surface Ag/core antibody total and hepatitis C are pending  -TTE pending  - has shiley catheter which was placed on 4/12/22, removed on 4/13/22    #Hyperuricemia:  Uric acid was elevated at 12.1 on admission. She was given rasburicase 3mgx1 and started on allopurinol 300mg PO qdaily.  Uric acid improved to 6.4 today. Continue allopurinol 300mg PO qdaily.  Continue IVNS at 100cc/hr and monitor volume status. Can give lasix PRN to keep the patient euvolemic.     #Pulmonary embolism:  - Start heparin gtt for full dose anticoagulation.     Please do not hesitate to page with questions.     Yuki Harden MD PGY4   [phone]  Hematology-Oncology Fellow

## 2022-04-13 NOTE — CHART NOTE - NSCHARTNOTEFT_GEN_A_CORE
MICU Transfer Note    Transfer from: MICU  Transfer to:  ( x ) Medicine    (  ) Telemetry    (  ) RCU    (  ) Palliative    (  ) Stroke Unit    (  ) _______________  Accepting physician: Dr. Nicolas PECK COURSE:    89F w/ T2DM, HTN presents to the ED for palpitations x 3 days and weakness x 1 day. She felt very weak and lightheaded while standing today, feeling like she would pass out so this prompted her to come to the ED. Recently she also had a cough x 3 weeks. She was seen by PMD who felt this was likely a URI. Pt continued to have cough with weakness and HR in 140s. Pt denies any fever, chills, SOB, chest pain, or abd pain. She did not take her medications this morning because she was feeling unwell. She also does not take her metformin regularly.     In ED, pt afib rvr HR 160s, satting well on RA, BP hypertensive, RR 14. Labs significant for + with 88% blasts concerning for acute leukemia. Heme consulted in ED, plan for leukophoresis. MICU consulted and accepted patient for urgent leukophoresis.     Received CHIOMA mascorro and leukophoresis with improvement in counts. Flow cytometry showed APML. Bone marrow bx done on 4/13. patient improved symptoms and now ready for transfer to floors.           Vital Signs Last 24 Hrs  T(C): 36.5 (13 Apr 2022 08:00), Max: 37.6 (12 Apr 2022 18:00)  T(F): 97.7 (13 Apr 2022 08:00), Max: 99.6 (12 Apr 2022 18:00)  HR: 99 (13 Apr 2022 10:00) (67 - 152)  BP: 132/76 (13 Apr 2022 10:00) (115/61 - 172/75)  BP(mean): 76 (13 Apr 2022 09:00) (71 - 122)  RR: 14 (13 Apr 2022 10:00) (14 - 27)  SpO2: 93% (13 Apr 2022 10:00) (92% - 100%)  I&O's Summary    12 Apr 2022 07:01  -  13 Apr 2022 07:00  --------------------------------------------------------  IN: 2050 mL / OUT: 900 mL / NET: 1150 mL    13 Apr 2022 07:01  -  13 Apr 2022 13:21  --------------------------------------------------------  IN: 450 mL / OUT: 350 mL / NET: 100 mL          MEDICATIONS  (STANDING):  allopurinol 300 milliGRAM(s) Oral daily  chlorhexidine 4% Liquid 1 Application(s) Topical <User Schedule>  dexAMETHasone  IVPB 10 milliGRAM(s) IV Intermittent every 12 hours  dextrose 5%. 1000 milliLiter(s) (50 mL/Hr) IV Continuous <Continuous>  dextrose 5%. 1000 milliLiter(s) (100 mL/Hr) IV Continuous <Continuous>  dextrose 50% Injectable 25 Gram(s) IV Push once  dextrose 50% Injectable 12.5 Gram(s) IV Push once  dextrose 50% Injectable 25 Gram(s) IV Push once  dextrose Oral Gel 15 Gram(s) Oral once  glucagon  Injectable 1 milliGRAM(s) IntraMuscular once  guaiFENesin  milliGRAM(s) Oral every 12 hours  heparin   Injectable 5000 Unit(s) SubCutaneous once  hydroxyurea 1000 milliGRAM(s) Oral every 8 hours  insulin glargine Injectable (LANTUS) 15 Unit(s) SubCutaneous at bedtime  insulin lispro (ADMELOG) corrective regimen sliding scale   SubCutaneous three times a day before meals  insulin lispro Injectable (ADMELOG) 5 Unit(s) SubCutaneous three times a day before meals  lactated ringers. 1000 milliLiter(s) (150 mL/Hr) IV Continuous <Continuous>  lidocaine 2% (Preservative-free) Injectable 20 milliLiter(s) Local Injection once  metoprolol tartrate 50 milliGRAM(s) Oral two times a day  potassium chloride  10 mEq/100 mL IVPB 10 milliEquivalent(s) IV Intermittent every 1 hour  tretinoin 40 milliGRAM(s) Oral every 12 hours  verapamil  milliGRAM(s) Oral daily    MEDICATIONS  (PRN):  benzonatate 100 milliGRAM(s) Oral three times a day PRN Cough        LABS                                            7.9                   Neurophils% (auto):   21.5   (04-13 @ 09:53):    66.35)-----------(98           Lymphocytes% (auto):  10.8                                          22.9                   Eosinphils% (auto):   0.0      Manual%: Neutrophils x    ; Lymphocytes x    ; Eosinophils x    ; Bands%: x    ; Blasts x                                    138    |  103    |  16                  Calcium: 8.7   / iCa: x      (04-13 @ 09:53)    ----------------------------<  377       Magnesium: x                                3.7     |  18     |  0.78             Phosphorous: x        TPro  4.5    /  Alb  3.0    /  TBili  1.3    /  DBili  x      /  AST  29     /  ALT  16     /  AlkPhos  64     13 Apr 2022 09:53    ( 04-12 @ 13:45 )   PT: 16.4 sec;   INR: 1.41 ratio  aPTT: 26.1 sec      A/P:  89F h/o HTN DM uterine ca s/p FRED and arthritis p/w generalied weakness and dyspnea x2wks found to have acute blast crisis requiring leukophoresis and afib w/ rvr.     # NEURO  AOx4 at baseline, mentating well currently  - Monitor per ICU protocol  - Delirium precautions    # CARDIAC  Afib in RVR, hemodynamically stable. Unclear if new or chronic   - Goal HR rate low 100s  - c/w home verapamil 240 mg daily, metoprolol succinate 25 mg daily  - labetalol 5 IV PRN for HR > 130  - CHADsVASC at least 5, would benefit from AC when stable/no planned procedures  - f/u trop trend  - needs baseline echo    HTN   - monitor BP and if she continues to be hypertensive, start home hydrochlorothiazide 25 mg daily    # PULM  No active pulmonary issues; will continue to monitor  - CTA 4/12 with R subsegmental PE, will AC for min. 3 months    # RENAL  No active renal issues; will need at least daily monitoring of Cr and TLS labs  - Strict I&Os while receiving fluids, monitor for oliguria and fluid overload    Hyperuricemia  - monitor w/ TLS labs. 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  - Leukophoresis indicated; will contact blood bank after adali placed  - TLS labs (CMP, phos, uric acid, LDH) q8 and DIC labs (fibrinogen and d-dimer) q8 per heme   - LR @ 100/hr monitor for fluid overload; can give lasix prn to maintain euvolemia  - F/u G6PD, hepatitis panel, hep b core total  - Keep fibrinogen > 150, plt > 50  - Treat hyperuricemia as above    Anemia  - 2/2 acute leukemia  - maintain active T&S  - transfuse hgb > 7, s/p 1 uprbc 4/12  - receiving 1u prbc 4/12    # ID  No obvious infection but now is immunocompromised  - F/u UA  - Monitor off abx; broad spectrum if febrile    # ENDO  T2DM, supposed to be on metformin at home but nonadherent  - TORRIE, FS qac, qhs   - F/u a1c in AM    # VASC  C/f DVT LLE  - f/u duplex    # ETHICS  - Spoke with patient at length to determine her goals. She values quantity of life, and therefore would want all medical interventions including intubation and resuscitation. In terms of her suspected leukemia, she wants all available treatments including invasive procedures such as a shiley and chemotherapy if indicated.   - She designated her daughter Cheyenne Harrell [phone] as her health care proxy in the event that she is unable to make her own medical decisions.     DVT - SCD R leg for now, f/u duplex, CTA    Lines: 4/12 CHIOMA Mascorro (4/12- )    Follow up:  [ ] possible transfer to [facility] for hematology  [ ] hem recs  [ ] BM bx results  [ ] trend blood sugars due to being on decadron, restarted on insulin long acting today.   [ ] PE found - monitor for now given thrombocytopenia  [ ] duplex lower extremities.   [ ] trend TLS labs q8hrs  [ ] trend DIC labs q8hrs       Andi Weathers, PGY3 MICU Transfer Note    Transfer from: MICU  Transfer to:  ( x ) Medicine    (  ) Telemetry    (  ) RCU    (  ) Palliative    (  ) Stroke Unit    (  ) _______________  Accepting physician: Dr. Valenzuela   [id number]    Signed out to Dr. Valenzuela    COURSE:    89F w/ T2DM, HTN presents to the ED for palpitations x 3 days and weakness x 1 day. She felt very weak and lightheaded while standing today, feeling like she would pass out so this prompted her to come to the ED. Recently she also had a cough x 3 weeks. She was seen by PMD who felt this was likely a URI. Pt continued to have cough with weakness and HR in 140s. Pt denies any fever, chills, SOB, chest pain, or abd pain. She did not take her medications this morning because she was feeling unwell. She also does not take her metformin regularly.     In ED, pt afib rvr HR 160s, satting well on RA, BP hypertensive, RR 14. Labs significant for + with 88% blasts concerning for acute leukemia. Heme consulted in ED, plan for leukophoresis. In the MICU for urgent leukophoresis.     Received CHIOMA bro and leukophoresis with improvement in counts. Flow cytometry showed APML. Bone marrow bx done on 4/13. PE on CTa chest - started hep gtt. Patient improved symptoms and now ready for transfer to floors.     Pending transfer to [address] floor - accepted by Dr. Eriberto Young      Vital Signs Last 24 Hrs  T(C): 36.5 (13 Apr 2022 08:00), Max: 37.6 (12 Apr 2022 18:00)  T(F): 97.7 (13 Apr 2022 08:00), Max: 99.6 (12 Apr 2022 18:00)  HR: 99 (13 Apr 2022 10:00) (67 - 152)  BP: 132/76 (13 Apr 2022 10:00) (115/61 - 172/75)  BP(mean): 76 (13 Apr 2022 09:00) (71 - 122)  RR: 14 (13 Apr 2022 10:00) (14 - 27)  SpO2: 93% (13 Apr 2022 10:00) (92% - 100%)  I&O's Summary    12 Apr 2022 07:01  -  13 Apr 2022 07:00  --------------------------------------------------------  IN: 2050 mL / OUT: 900 mL / NET: 1150 mL    13 Apr 2022 07:01  -  13 Apr 2022 13:21  --------------------------------------------------------  IN: 450 mL / OUT: 350 mL / NET: 100 mL          MEDICATIONS  (STANDING):  allopurinol 300 milliGRAM(s) Oral daily  chlorhexidine 4% Liquid 1 Application(s) Topical <User Schedule>  dexAMETHasone  IVPB 10 milliGRAM(s) IV Intermittent every 12 hours  dextrose 5%. 1000 milliLiter(s) (50 mL/Hr) IV Continuous <Continuous>  dextrose 5%. 1000 milliLiter(s) (100 mL/Hr) IV Continuous <Continuous>  dextrose 50% Injectable 25 Gram(s) IV Push once  dextrose 50% Injectable 12.5 Gram(s) IV Push once  dextrose 50% Injectable 25 Gram(s) IV Push once  dextrose Oral Gel 15 Gram(s) Oral once  glucagon  Injectable 1 milliGRAM(s) IntraMuscular once  guaiFENesin  milliGRAM(s) Oral every 12 hours  heparin   Injectable 5000 Unit(s) SubCutaneous once  hydroxyurea 1000 milliGRAM(s) Oral every 8 hours  insulin glargine Injectable (LANTUS) 15 Unit(s) SubCutaneous at bedtime  insulin lispro (ADMELOG) corrective regimen sliding scale   SubCutaneous three times a day before meals  insulin lispro Injectable (ADMELOG) 5 Unit(s) SubCutaneous three times a day before meals  lactated ringers. 1000 milliLiter(s) (150 mL/Hr) IV Continuous <Continuous>  lidocaine 2% (Preservative-free) Injectable 20 milliLiter(s) Local Injection once  metoprolol tartrate 50 milliGRAM(s) Oral two times a day  potassium chloride  10 mEq/100 mL IVPB 10 milliEquivalent(s) IV Intermittent every 1 hour  tretinoin 40 milliGRAM(s) Oral every 12 hours  verapamil  milliGRAM(s) Oral daily    MEDICATIONS  (PRN):  benzonatate 100 milliGRAM(s) Oral three times a day PRN Cough        LABS                                            7.9                   Neurophils% (auto):   21.5   (04-13 @ 09:53):    66.35)-----------(98           Lymphocytes% (auto):  10.8                                          22.9                   Eosinphils% (auto):   0.0      Manual%: Neutrophils x    ; Lymphocytes x    ; Eosinophils x    ; Bands%: x    ; Blasts x                                    138    |  103    |  16                  Calcium: 8.7   / iCa: x      (04-13 @ 09:53)    ----------------------------<  377       Magnesium: x                                3.7     |  18     |  0.78             Phosphorous: x        TPro  4.5    /  Alb  3.0    /  TBili  1.3    /  DBili  x      /  AST  29     /  ALT  16     /  AlkPhos  64     13 Apr 2022 09:53    ( 04-12 @ 13:45 )   PT: 16.4 sec;   INR: 1.41 ratio  aPTT: 26.1 sec      A/P:  89F h/o HTN DM uterine ca s/p FRED and arthritis p/w generalied weakness and dyspnea x2wks found to have acute blast crisis requiring leukophoresis and afib w/ rvr.       Follow up:  [ ] Pending transfer to [address] Heme floor - accepted by Dr. Eriberto Young over there. [place] center number [phone]. WE called around 3PM, they said will call back, have not heard back yet  [ ] f/u hem recs  [ ] s/p BM bx 4/13, f/u results  [ ] trend blood sugars due to being on decadron, restarted on insulin long acting today (home long acting insulin 15u qAM)   [ ] PE found - started hep gtt, monitor APTT  [ ] trend TLS labs q8hrs  [ ] trend DIC labs q8hrs MICU Transfer Note    Transfer from: MICU  Transfer to:  ( x ) Medicine    (  ) Telemetry    (  ) RCU    (  ) Palliative    (  ) Stroke Unit    (  ) _______________  Accepting physician: Dr. Valenzuela   [id number]    Signed out to Dr. Valenzuela    COURSE:    89F w/ T2DM, HTN presents to the ED for palpitations x 3 days and weakness x 1 day. She felt very weak and lightheaded while standing today, feeling like she would pass out so this prompted her to come to the ED. Recently she also had a cough x 3 weeks. She was seen by PMD who felt this was likely a URI. Pt continued to have cough with weakness and HR in 140s. Pt denies any fever, chills, SOB, chest pain, or abd pain. She did not take her medications this morning because she was feeling unwell. She also does not take her metformin regularly.     In ED, pt afib rvr HR 160s, satting well on RA, BP hypertensive, RR 14. Labs significant for + with 88% blasts concerning for acute leukemia. Heme consulted in ED, plan for leukophoresis. In the MICU for urgent leukophoresis.     Received CHIOMA bro and leukophoresis with improvement in counts. Flow cytometry showed APML. Bone marrow bx done on 4/13. PE on CTa chest - started hep gtt. Patient improved symptoms and now ready for transfer to floors.     Pending transfer to [address] floor - accepted by Dr. Eriberto Young      Vital Signs Last 24 Hrs  T(C): 36.5 (13 Apr 2022 08:00), Max: 37.6 (12 Apr 2022 18:00)  T(F): 97.7 (13 Apr 2022 08:00), Max: 99.6 (12 Apr 2022 18:00)  HR: 99 (13 Apr 2022 10:00) (67 - 152)  BP: 132/76 (13 Apr 2022 10:00) (115/61 - 172/75)  BP(mean): 76 (13 Apr 2022 09:00) (71 - 122)  RR: 14 (13 Apr 2022 10:00) (14 - 27)  SpO2: 93% (13 Apr 2022 10:00) (92% - 100%)  I&O's Summary    12 Apr 2022 07:01  -  13 Apr 2022 07:00  --------------------------------------------------------  IN: 2050 mL / OUT: 900 mL / NET: 1150 mL    13 Apr 2022 07:01  -  13 Apr 2022 13:21  --------------------------------------------------------  IN: 450 mL / OUT: 350 mL / NET: 100 mL          MEDICATIONS  (STANDING):  allopurinol 300 milliGRAM(s) Oral daily  chlorhexidine 4% Liquid 1 Application(s) Topical <User Schedule>  dexAMETHasone  IVPB 10 milliGRAM(s) IV Intermittent every 12 hours  dextrose 5%. 1000 milliLiter(s) (50 mL/Hr) IV Continuous <Continuous>  dextrose 5%. 1000 milliLiter(s) (100 mL/Hr) IV Continuous <Continuous>  dextrose 50% Injectable 25 Gram(s) IV Push once  dextrose 50% Injectable 12.5 Gram(s) IV Push once  dextrose 50% Injectable 25 Gram(s) IV Push once  dextrose Oral Gel 15 Gram(s) Oral once  glucagon  Injectable 1 milliGRAM(s) IntraMuscular once  guaiFENesin  milliGRAM(s) Oral every 12 hours  heparin   Injectable 5000 Unit(s) SubCutaneous once  hydroxyurea 1000 milliGRAM(s) Oral every 8 hours  insulin glargine Injectable (LANTUS) 15 Unit(s) SubCutaneous at bedtime  insulin lispro (ADMELOG) corrective regimen sliding scale   SubCutaneous three times a day before meals  insulin lispro Injectable (ADMELOG) 5 Unit(s) SubCutaneous three times a day before meals  lactated ringers. 1000 milliLiter(s) (150 mL/Hr) IV Continuous <Continuous>  lidocaine 2% (Preservative-free) Injectable 20 milliLiter(s) Local Injection once  metoprolol tartrate 50 milliGRAM(s) Oral two times a day  potassium chloride  10 mEq/100 mL IVPB 10 milliEquivalent(s) IV Intermittent every 1 hour  tretinoin 40 milliGRAM(s) Oral every 12 hours  verapamil  milliGRAM(s) Oral daily    MEDICATIONS  (PRN):  benzonatate 100 milliGRAM(s) Oral three times a day PRN Cough        LABS                                            7.9                   Neurophils% (auto):   21.5   (04-13 @ 09:53):    66.35)-----------(98           Lymphocytes% (auto):  10.8                                          22.9                   Eosinphils% (auto):   0.0      Manual%: Neutrophils x    ; Lymphocytes x    ; Eosinophils x    ; Bands%: x    ; Blasts x                                    138    |  103    |  16                  Calcium: 8.7   / iCa: x      (04-13 @ 09:53)    ----------------------------<  377       Magnesium: x                                3.7     |  18     |  0.78             Phosphorous: x        TPro  4.5    /  Alb  3.0    /  TBili  1.3    /  DBili  x      /  AST  29     /  ALT  16     /  AlkPhos  64     13 Apr 2022 09:53    ( 04-12 @ 13:45 )   PT: 16.4 sec;   INR: 1.41 ratio  aPTT: 26.1 sec      A/P:  89F h/o HTN DM uterine ca s/p FRED and arthritis p/w generalied weakness and dyspnea x2wks found to have acute blast crisis requiring leukophoresis and afib w/ rvr.       Follow up:  [ ] Pending transfer to [address] Heme floor - accepted by Dr. Eriberto Young over there. [place] center number [phone]. We called around 3PM, they said will call back, have not heard back yet  [ ] f/u hem recs  [ ] s/p BM bx 4/13, f/u results  [ ] trend blood sugars due to being on decadron, restarted on insulin long acting today (home long acting insulin 15u qAM)   [ ] PE found - started hep gtt, monitor APTT  [ ] trend TLS labs q8hrs  [ ] trend DIC labs q8hrs

## 2022-04-13 NOTE — CONSULT NOTE ADULT - SUBJECTIVE AND OBJECTIVE BOX
HPI:  This patient is an 89yo lady with PMH of htn and DM who presents to the ED with complaint of weakness. The patient endorsed having progressive generalized weakness over the last 3 weeks. At baseline, she is ambulatory with a cane and cooks at home. She had difficulty getting up to go to the bathroom today, thus her granddaughter, Radha brought her to the ED. The patient also endorses having thick white sputum, dry cough and elevated heart rate. She reports hearing "noise in my head."  The patient denies any headache, vision changes, focal weakness or numbness. She also denies any chest pain, dyspnea, abdominal pain. She denies any bleeding or bruising. Patient reports she did not take any of her home medications today.     PAST MEDICAL & SURGICAL HISTORY:  H/O: HTN (hypertension)  Diabetes mellitus  Chronic atrial fibrillation    H/O: hysterectomy  indication: uterine cancer    FAMILY HISTORY: No pertinent family history of leukemia     Social History: The patient lives with her family. At baseline, she is ambulatory with a cane and cooks at home.     REVIEW OF SYSTEMS  CONSTITUTIONAL: No fever, no chills, + fatigue  EYES: No eye pain, no vision changes  ENMT:  No difficulty hearing, no throat pain  RESPIRATORY: + dry cough, + white sputum, No shortness of breath  CARDIOVASCULAR: No chest pain, + palpitations  GASTROINTESTINAL: No abdominal pain, no nausea, no vomiting, no diarrhea, no constipatio  GENITOURINARY: No dysuria, no hematuria  NEUROLOGICAL: No numbness , + "hearing a noise" , no headache  SKIN: No itching, no rashes,  HEME/LYMPH: No easy bruising, bleeding    >>> <<<>>> <<<  >>> <<<  Allergies- No Known Allergies  Intolerances    Medications  MEDICATIONS  (STANDING):  chlorhexidine 4% Liquid 1 Application(s) Topical <User Schedule>  lactated ringers. 1000 milliLiter(s) (100 mL/Hr) IV Continuous <Continuous>  metoprolol succinate ER 25 milliGRAM(s) Oral daily  potassium chloride  10 mEq/100 mL IVPB 10 milliEquivalent(s) IV Intermittent every 1 hour  rasburicase IVPB 3 milliGRAM(s) IV Intermittent once  verapamil  milliGRAM(s) Oral daily    MEDICATIONS  (PRN):      PHYSICAL EXAM:  GENERAL: NAD, well-groomed  HEAD:  Atraumatic, Normocephalic  EYES: EOMI, PERRLA, conjunctiva and sclera clear  ENMT: No oropharyngeal exudates, Moist mucous membranes  NECK: Supple, no cervical lymphadenopathy  NERVOUS SYSTEM:  A&O x 3, alert and conversant, moving all extremities spontaneously, follows commands  CHEST/LUNG: Clear to auscultation bilaterally; no rhonchi  HEART: tachycardic and irregular  ABDOMEN: Soft, Nontender, Nondistended  EXTREMITIES:  2+ radial Pulses, No cyanosis or edema  SKIN: warm, dry    LABS:                        6.8    231.30 )-----------( 59       ( 12 Apr 2022 13:45 )             20.8     04-12    139  |  100  |  16  ----------------------------<  343<H>  2.4<LL>   |  24  |  0.85    Ca    9.5      12 Apr 2022 13:45    TPro  7.3  /  Alb  3.5  /  TBili  0.8  /  DBili  x   /  AST  34<H>  /  ALT  17  /  AlkPhos  88  04-12    PT/INR - ( 12 Apr 2022 13:45 )   PT: 16.4 sec;   INR: 1.41 ratio         PTT - ( 12 Apr 2022 13:45 )  PTT:26.1 sec    
Patient is a 89y old  Female who presents with a chief complaint of Acute Blast Crisis (13 Apr 2022 07:42) Was called for cytoreduction. Consent was obtained from the patient over the phone after explaining the risk and benefits.   Patient tolerated the procedure well. No complications.    Vital Signs Last 24 Hrs  T(C): 36.5 (13 Apr 2022 08:00), Max: 37.6 (12 Apr 2022 18:00)  T(F): 97.7 (13 Apr 2022 08:00), Max: 99.6 (12 Apr 2022 18:00)  HR: 99 (13 Apr 2022 10:00) (67 - 160)  BP: 132/76 (13 Apr 2022 10:00) (115/61 - 172/75)  BP(mean): 76 (13 Apr 2022 09:00) (71 - 122)  RR: 14 (13 Apr 2022 10:00) (14 - 27)  SpO2: 93% (13 Apr 2022 10:00) (92% - 100%)  Allergies    No Known Allergies    Intolerances      PAST MEDICAL & SURGICAL HISTORY:  H/O: HTN (hypertension)    Diabetes mellitus    Chronic atrial fibrillation    H/O: hysterectomy  indication: uterine cancer                              7.9    66.35 )-----------( 98       ( 13 Apr 2022 09:53 )             22.9     PT/INR - ( 12 Apr 2022 13:45 )   PT: 16.4 sec;   INR: 1.41 ratio         PTT - ( 12 Apr 2022 13:45 )  PTT:26.1 sec    Lactate Dehydrogenase, Serum: 1172 U/L (04-13 @ 03:36)  Lactate Dehydrogenase, Serum: 1538 U/L (04-12 @ 23:20)  Lactate Dehydrogenase, Serum: 1726 U/L (04-12 @ 15:06)        04-13    138  |  103  |  x   ----------------------------<  377<H>  3.7   |  x   |  0.78    Ca    8.7      13 Apr 2022 09:53  Phos  2.0     04-13  Mg     1.60     04-12    TPro  x   /  Alb  3.0<L>  /  TBili  1.3<H>  /  DBili  x   /  AST  29  /  ALT  x   /  AlkPhos  64  04-13

## 2022-04-13 NOTE — PROGRESS NOTE ADULT - SUBJECTIVE AND OBJECTIVE BOX
Hematology Oncology Follow-up    INTERVAL HPI/OVERNIGHT EVENTS:  The patient underwent leukopheresis.       VITAL SIGNS:  T(F): 97.7 (04-13-22 @ 08:00)  HR: 99 (04-13-22 @ 10:00)  BP: 132/76 (04-13-22 @ 10:00)  RR: 14 (04-13-22 @ 10:00)  SpO2: 93% (04-13-22 @ 10:00)  Wt(kg): --    04-12-22 @ 07:01  -  04-13-22 @ 07:00  --------------------------------------------------------  IN: 2050 mL / OUT: 900 mL / NET: 1150 mL    04-13-22 @ 07:01  -  04-13-22 @ 11:04  --------------------------------------------------------  IN: 450 mL / OUT: 350 mL / NET: 100 mL      PHYSICAL EXAM:  GENERAL: NAD, well-groomed  HEAD:  Atraumatic, Normocephalic  EYES: EOMI, PERRLA, conjunctiva and sclera clear  ENMT: No oropharyngeal exudates, Moist mucous membranes  NECK: Supple, no cervical lymphadenopathy  NERVOUS SYSTEM:  alert and conversant, moving all extremities spontaneously   CHEST/LUNG: Clear to auscultation bilaterally; no rhonchi  HEART: Regular rate and rhythm; No murmurs  ABDOMEN: Soft, Nontender, Nondistended  EXTREMITIES:  2+ radial Pulses, No cyanosis or edema  SKIN: warm, dry    Medications  MEDICATIONS  (STANDING):  allopurinol 300 milliGRAM(s) Oral daily  chlorhexidine 4% Liquid 1 Application(s) Topical <User Schedule>  dexAMETHasone  IVPB 10 milliGRAM(s) IV Intermittent every 12 hours  dextrose 50% Injectable 25 Gram(s) IV Push once  dextrose 50% Injectable 12.5 Gram(s) IV Push once  dextrose 50% Injectable 25 Gram(s) IV Push once  dextrose Oral Gel 15 Gram(s) Oral once  glucagon  Injectable 1 milliGRAM(s) IntraMuscular once  guaiFENesin  milliGRAM(s) Oral every 12 hours  hydroxyurea 1000 milliGRAM(s) Oral every 8 hours  insulin lispro (ADMELOG) corrective regimen sliding scale   SubCutaneous every 6 hours  lactated ringers. 1000 milliLiter(s) (150 mL/Hr) IV Continuous <Continuous>  metoprolol tartrate 50 milliGRAM(s) Oral two times a day  potassium chloride  10 mEq/100 mL IVPB 10 milliEquivalent(s) IV Intermittent every 1 hour  tretinoin 40 milliGRAM(s) Oral every 12 hours  verapamil  milliGRAM(s) Oral daily    MEDICATIONS  (PRN):  benzonatate 100 milliGRAM(s) Oral three times a day PRN Cough      Allergies: No Known Allergies      LABS:                        7.9    66.35 )-----------( 98       ( 13 Apr 2022 09:53 )             22.9     04-13    138  |  103  |  x   ----------------------------<  377<H>  3.7   |  x   |  0.78    Ca    8.7      13 Apr 2022 09:53  Phos  2.0     04-13  Mg     1.60     04-12    TPro  x   /  Alb  3.0<L>  /  TBili  1.3<H>  /  DBili  x   /  AST  29  /  ALT  x   /  AlkPhos  64  04-13    PT/INR - ( 12 Apr 2022 13:45 )   PT: 16.4 sec;   INR: 1.41 ratio         PTT - ( 12 Apr 2022 13:45 )  PTT:26.1 sec Fibrinogen Assay: 274 mg/dL (04-13 @ 09:53)  Fibrinogen Assay: 267 mg/dL (04-13 @ 03:36)  Lactate Dehydrogenase, Serum: 1172 U/L (04-13 @ 03:36)  Fibrinogen Assay: 314 mg/dL (04-12 @ 23:21)  Lactate Dehydrogenase, Serum: 1538 U/L (04-12 @ 23:20)  Haptoglobin, Serum: <20 mg/dL (04-12 @ 15:06)  Lactate Dehydrogenase, Serum: 1726 U/L (04-12 @ 15:06)  Fibrinogen Assay: 378 mg/dL (04-12 @ 13:45)      RADIOLOGY & ADDITIONAL TESTS:  < from: CT Angio Chest PE Protocol w/ IV Cont (04.12.22 @ 17:46) >  PROCEDURE:  CT Angiography of the Chest.  Sagittal and coronal reformats were performed as well as 3D (MIP)   reconstructions.    FINDINGS:    LUNGS AND AIRWAYS: Patent central airways.  Patchy peripheral groundglass   opacities, with tree-in-bud nodules, and linear subsegmental atelectasis.   Mild bilateral lower lobe bronchiectasis.  PLEURA: No pleural effusion.  MEDIASTINUM AND LEONOR: There are few small mediastinal lymph nodes.  VESSELS: Acute subsegmental pulmonary artery embolus in the right upper   lobe (3:123). Aortic and coronary artery atherosclerosis.  HEART: Prominent right heart No pericardial effusion.  CHEST WALL AND LOWER NECK: There is a calcified nodule within the right   lobe of the thyroid gland.  VISUALIZED UPPER ABDOMEN: Within normal limits.  BONES: Degenerative change.    IMPRESSION:  Acute subsegmental pulmonary artery embolus in the right upper lobe.   Question right heart strain, echo recommended for complete evaluation.    Patchy bilateral upper lobe opacities may be infectious or inflammatory.    < end of copied text >   Hematology Oncology Follow-up    INTERVAL HPI/OVERNIGHT EVENTS:  The patient underwent leukopheresis. She reports feeling hungry and tired from not sleeping well last night.     VITAL SIGNS:  T(F): 97.7 (04-13-22 @ 08:00)  HR: 99 (04-13-22 @ 10:00)  BP: 132/76 (04-13-22 @ 10:00)  RR: 14 (04-13-22 @ 10:00)  SpO2: 93% (04-13-22 @ 10:00)  Wt(kg): --    04-12-22 @ 07:01  -  04-13-22 @ 07:00  --------------------------------------------------------  IN: 2050 mL / OUT: 900 mL / NET: 1150 mL    04-13-22 @ 07:01  -  04-13-22 @ 11:04  --------------------------------------------------------  IN: 450 mL / OUT: 350 mL / NET: 100 mL      PHYSICAL EXAM:  GENERAL: NAD, well-groomed  HEAD:  Atraumatic, Normocephalic  EYES: EOMI, PERRLA, conjunctiva and sclera clear  ENMT: No oropharyngeal exudates, Moist mucous membranes  NECK: Supple, no cervical lymphadenopathy  NERVOUS SYSTEM:  alert and conversant, moving all extremities spontaneously   CHEST/LUNG: Clear to auscultation bilaterally; no rhonchi  HEART: Regular rate and rhythm; No murmurs  ABDOMEN: Soft, Nontender, Nondistended  EXTREMITIES:  2+ radial Pulses, No cyanosis or edema  SKIN: warm, dry    Medications  MEDICATIONS  (STANDING):  allopurinol 300 milliGRAM(s) Oral daily  chlorhexidine 4% Liquid 1 Application(s) Topical <User Schedule>  dexAMETHasone  IVPB 10 milliGRAM(s) IV Intermittent every 12 hours  dextrose 50% Injectable 25 Gram(s) IV Push once  dextrose 50% Injectable 12.5 Gram(s) IV Push once  dextrose 50% Injectable 25 Gram(s) IV Push once  dextrose Oral Gel 15 Gram(s) Oral once  glucagon  Injectable 1 milliGRAM(s) IntraMuscular once  guaiFENesin  milliGRAM(s) Oral every 12 hours  hydroxyurea 1000 milliGRAM(s) Oral every 8 hours  insulin lispro (ADMELOG) corrective regimen sliding scale   SubCutaneous every 6 hours  lactated ringers. 1000 milliLiter(s) (150 mL/Hr) IV Continuous <Continuous>  metoprolol tartrate 50 milliGRAM(s) Oral two times a day  potassium chloride  10 mEq/100 mL IVPB 10 milliEquivalent(s) IV Intermittent every 1 hour  tretinoin 40 milliGRAM(s) Oral every 12 hours  verapamil  milliGRAM(s) Oral daily    MEDICATIONS  (PRN):  benzonatate 100 milliGRAM(s) Oral three times a day PRN Cough      Allergies: No Known Allergies      LABS:                        7.9    66.35 )-----------( 98       ( 13 Apr 2022 09:53 )             22.9     04-13    138  |  103  |  x   ----------------------------<  377<H>  3.7   |  x   |  0.78    Ca    8.7      13 Apr 2022 09:53  Phos  2.0     04-13  Mg     1.60     04-12    TPro  x   /  Alb  3.0<L>  /  TBili  1.3<H>  /  DBili  x   /  AST  29  /  ALT  x   /  AlkPhos  64  04-13    PT/INR - ( 12 Apr 2022 13:45 )   PT: 16.4 sec;   INR: 1.41 ratio         PTT - ( 12 Apr 2022 13:45 )  PTT:26.1 sec Fibrinogen Assay: 274 mg/dL (04-13 @ 09:53)  Fibrinogen Assay: 267 mg/dL (04-13 @ 03:36)  Lactate Dehydrogenase, Serum: 1172 U/L (04-13 @ 03:36)  Fibrinogen Assay: 314 mg/dL (04-12 @ 23:21)  Lactate Dehydrogenase, Serum: 1538 U/L (04-12 @ 23:20)  Haptoglobin, Serum: <20 mg/dL (04-12 @ 15:06)  Lactate Dehydrogenase, Serum: 1726 U/L (04-12 @ 15:06)  Fibrinogen Assay: 378 mg/dL (04-12 @ 13:45)      RADIOLOGY & ADDITIONAL TESTS:  < from: CT Angio Chest PE Protocol w/ IV Cont (04.12.22 @ 17:46) >  PROCEDURE:  CT Angiography of the Chest.  Sagittal and coronal reformats were performed as well as 3D (MIP)   reconstructions.    FINDINGS:    LUNGS AND AIRWAYS: Patent central airways.  Patchy peripheral groundglass   opacities, with tree-in-bud nodules, and linear subsegmental atelectasis.   Mild bilateral lower lobe bronchiectasis.  PLEURA: No pleural effusion.  MEDIASTINUM AND LEONOR: There are few small mediastinal lymph nodes.  VESSELS: Acute subsegmental pulmonary artery embolus in the right upper   lobe (3:123). Aortic and coronary artery atherosclerosis.  HEART: Prominent right heart No pericardial effusion.  CHEST WALL AND LOWER NECK: There is a calcified nodule within the right   lobe of the thyroid gland.  VISUALIZED UPPER ABDOMEN: Within normal limits.  BONES: Degenerative change.    IMPRESSION:  Acute subsegmental pulmonary artery embolus in the right upper lobe.   Question right heart strain, echo recommended for complete evaluation.    Patchy bilateral upper lobe opacities may be infectious or inflammatory.    < end of copied text >

## 2022-04-13 NOTE — PROGRESS NOTE ADULT - ATTENDING COMMENTS
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

## 2022-04-13 NOTE — DISCHARGE NOTE PROVIDER - NSDCMRMEDTOKEN_GEN_ALL_CORE_FT
allopurinol 300 mg oral tablet: 1 tab(s) orally once a day  dexamethasone 6 mg/25 mL-NaCl 0.9% intravenous solution: 41.67 milliliter(s) intravenous every 12 hours  heparin 100 units/mL-D5% intravenous solution: 13ml/hr    25,000U in dextrose 5% 250mL  hydroxyurea 500 mg oral capsule: 2 cap(s) orally every 8 hours  insulin glargine 100 units/mL subcutaneous solution: 15 unit(s) subcutaneous once a day (at bedtime)  insulin lispro 100 units/mL injectable solution: 5 unit(s) injectable 3 times a day (before meals)  insulin lispro 100 units/mL injectable solution: insulin sliding scale    2 Unit(s) if Glucose 151 - 200  4 Unit(s) if Glucose 201 - 250  6 Unit(s) if Glucose 251 - 300  8 Unit(s) if Glucose 301 - 350  10 Unit(s) if Glucose 351 - 400  12 Unit(s) if Glucose Greater Than 400  insulin lispro 100 units/mL injectable solution: 1 unit(s) injectable once a day (at bedtime) - 250  1 Unit(s) if Glucose 251 - 300  2 Unit(s) if Glucose 301 - 350  3 Unit(s) if Glucose 351 - 400  4 Unit(s) if Glucose Greater Than 400  metoprolol tartrate 50 mg oral tablet: 1 tab(s) orally 2 times a day  pantoprazole 40 mg oral granule, delayed release: 40 milligram(s) orally once a day    while on decadron  tretinoin 10 mg oral capsule: 4 cap(s) orally every 12 hours  verapamil 240 mg/12 hours oral tablet, extended release: 1 tab(s) orally once a day   allopurinol 300 mg oral tablet: 1 tab(s) orally once a day  dexamethasone 6 mg/25 mL-NaCl 0.9% intravenous solution: 41.67 milliliter(s) intravenous every 12 hours  heparin 100 units/mL-D5% intravenous solution: 13ml/hr    25,000U in dextrose 5% 250mL  insulin glargine 100 units/mL subcutaneous solution: 15 unit(s) subcutaneous once a day (at bedtime)  insulin lispro 100 units/mL injectable solution: 5 unit(s) injectable 3 times a day (before meals)  insulin lispro 100 units/mL injectable solution: insulin sliding scale    2 Unit(s) if Glucose 151 - 200  4 Unit(s) if Glucose 201 - 250  6 Unit(s) if Glucose 251 - 300  8 Unit(s) if Glucose 301 - 350  10 Unit(s) if Glucose 351 - 400  12 Unit(s) if Glucose Greater Than 400  insulin lispro 100 units/mL injectable solution: 1 unit(s) injectable once a day (at bedtime) - 250  1 Unit(s) if Glucose 251 - 300  2 Unit(s) if Glucose 301 - 350  3 Unit(s) if Glucose 351 - 400  4 Unit(s) if Glucose Greater Than 400  metoprolol tartrate 50 mg oral tablet: 1 tab(s) orally 2 times a day  pantoprazole 40 mg oral granule, delayed release: 40 milligram(s) orally once a day    while on decadron  posaconazole 100 mg oral delayed release tablet: 3 tab(s) orally once a day   venetoclax 100 mg oral tablet: 1 tab(s) orally once a day  verapamil 240 mg/12 hours oral tablet, extended release: 1 tab(s) orally once a day

## 2022-04-13 NOTE — PROGRESS NOTE ADULT - SUBJECTIVE AND OBJECTIVE BOX
Brian Mcclellan (Uri) PGY-3  Pager: NS) [phone]/ (LVR) [phone]    Patient is a 89y old  Female who presents with a chief complaint of Acute Blast Crisis (12 Apr 2022 17:36)      SUBJECTIVE / OVERNIGHT EVENTS:  No acute events over night. Denies any fevers/chills, headache, CP, SOB, abd pain, N/V/D, constipation, or leg swelling.       MEDICATIONS  (STANDING):  allopurinol 300 milliGRAM(s) Oral daily  chlorhexidine 4% Liquid 1 Application(s) Topical <User Schedule>  dextrose 50% Injectable 25 Gram(s) IV Push once  dextrose 50% Injectable 12.5 Gram(s) IV Push once  dextrose 50% Injectable 25 Gram(s) IV Push once  dextrose Oral Gel 15 Gram(s) Oral once  glucagon  Injectable 1 milliGRAM(s) IntraMuscular once  hydroxyurea 1000 milliGRAM(s) Oral every 8 hours  insulin lispro (ADMELOG) corrective regimen sliding scale   SubCutaneous three times a day before meals  insulin lispro (ADMELOG) corrective regimen sliding scale   SubCutaneous at bedtime  lactated ringers. 1000 milliLiter(s) (150 mL/Hr) IV Continuous <Continuous>  metoprolol tartrate 50 milliGRAM(s) Oral two times a day  potassium chloride  10 mEq/100 mL IVPB 10 milliEquivalent(s) IV Intermittent every 1 hour  verapamil  milliGRAM(s) Oral daily    MEDICATIONS  (PRN):  benzonatate 100 milliGRAM(s) Oral three times a day PRN Cough      REVIEW OF SYSTEMS:  Constitutional: [-] fevers, [ -] chills, [- ] weight loss, [- ] weight gain  HEENT: [ -] vision problems, [- ] eye pain, [ -] nasal congestion, [- ] rhinorrhea, [- ] sore throat, [- ] dysphagia  CV: [- ] chest pain, [- ] orthopnea, [- ] palpitations  Resp: [- ] cough, [- ] dyspnea, [- ] wheezing, [ -] hemoptysis  GI: [- ] nausea, [- ] vomiting, [- ] diarrhea, [ -] constipation, [- ] abdominal pain  : [- ] dysuria [- ] nocturia [- ] hematuria [ -] increased urinary frequency  Musculoskeletal: [- ] back pain [ -] myalgias [- ] arthralgias [- ] fracture  Skin: [- ] rash [ -] itch  Neurological: [ -] headache [- ] dizziness [- ] syncope [- ] weakness [- ] numbness  Psychiatric: [- ] anxiety [- ] depression  Endocrine: [- ] diabetes [ -] thyroid problem  Hematologic/Lymphatic: [- ] anemia [- ] bleeding problem  Allergic/Immunologic: [ -] itchy eyes [ -] nasal discharge [- ] hives [ -] angioedema  [ ] Unable to assess ROS because ________    OBJECTIVE:  Vital Signs Last 24 Hrs  T(C): 37.4 (13 Apr 2022 04:00), Max: 37.6 (12 Apr 2022 18:00)  T(F): 99.4 (13 Apr 2022 04:00), Max: 99.6 (12 Apr 2022 18:00)  HR: 108 (13 Apr 2022 06:00) (67 - 160)  BP: 136/69 (13 Apr 2022 06:00) (124/76 - 172/75)  BP(mean): 90 (13 Apr 2022 06:00) (90 - 122)  RR: 25 (13 Apr 2022 06:00) (15 - 27)  SpO2: 94% (13 Apr 2022 06:00) (94% - 100%)  PHYSICAL EXAM:  GENERAL: NAD, well-developed  HEAD:  Atraumatic, Normocephalic  EYES: conjunctiva and sclera clear  NECK: Supple, No JVD  CHEST/LUNG: Clear to auscultation bilaterally; No wheeze  HEART: Regular rate and rhythm; No murmurs, rubs, or gallops  ABDOMEN: Soft, Nontender, Nondistended; Bowel sounds present  EXTREMITIES:  No clubbing, cyanosis, or edema  PSYCH: AAOx3  NEUROLOGY: non-focal  SKIN: No rashes or lesions    CAPILLARY BLOOD GLUCOSE      POCT Blood Glucose.: 334 mg/dL (13 Apr 2022 05:47)  POCT Blood Glucose.: 351 mg/dL (12 Apr 2022 22:27)  POCT Blood Glucose.: 351 mg/dL (12 Apr 2022 22:26)  POCT Blood Glucose.: 359 mg/dL (12 Apr 2022 12:12)    I&O's Summary    12 Apr 2022 07:01  -  13 Apr 2022 07:00  --------------------------------------------------------  IN: 2050 mL / OUT: 900 mL / NET: 1150 mL              LABS:                        7.9    60.31 )-----------( 40       ( 13 Apr 2022 03:36 )             22.7     04-13    140  |  102  |  14  ----------------------------<  331<H>  3.0<L>   |  24  |  0.77    Ca    9.1      13 Apr 2022 03:36  Phos  2.0     04-13  Mg     1.60     04-12    TPro  5.7<L>  /  Alb  2.9<L>  /  TBili  0.6  /  DBili  x   /  AST  26  /  ALT  13  /  AlkPhos  60  04-13    PT/INR - ( 12 Apr 2022 13:45 )   PT: 16.4 sec;   INR: 1.41 ratio         PTT - ( 12 Apr 2022 13:45 )  PTT:26.1 sec              RADIOLOGY & ADDITIONAL TESTS:       Patient is a 89y old  Female who presents with a chief complaint of Acute Blast Crisis (12 Apr 2022 17:36)      SUBJECTIVE / OVERNIGHT EVENTS:  Received leukopahresis overnight, tolerated well. got 1u plts at 4AM, metoprolol changed to 50 BID. Reports coughing and itchy throat, otherwise no other complaints       MEDICATIONS  (STANDING):  allopurinol 300 milliGRAM(s) Oral daily  chlorhexidine 4% Liquid 1 Application(s) Topical <User Schedule>  dextrose 50% Injectable 25 Gram(s) IV Push once  dextrose 50% Injectable 12.5 Gram(s) IV Push once  dextrose 50% Injectable 25 Gram(s) IV Push once  dextrose Oral Gel 15 Gram(s) Oral once  glucagon  Injectable 1 milliGRAM(s) IntraMuscular once  hydroxyurea 1000 milliGRAM(s) Oral every 8 hours  insulin lispro (ADMELOG) corrective regimen sliding scale   SubCutaneous three times a day before meals  insulin lispro (ADMELOG) corrective regimen sliding scale   SubCutaneous at bedtime  lactated ringers. 1000 milliLiter(s) (150 mL/Hr) IV Continuous <Continuous>  metoprolol tartrate 50 milliGRAM(s) Oral two times a day  potassium chloride  10 mEq/100 mL IVPB 10 milliEquivalent(s) IV Intermittent every 1 hour  verapamil  milliGRAM(s) Oral daily    MEDICATIONS  (PRN):  benzonatate 100 milliGRAM(s) Oral three times a day PRN Cough      OBJECTIVE:  Vital Signs Last 24 Hrs  T(C): 37.4 (13 Apr 2022 04:00), Max: 37.6 (12 Apr 2022 18:00)  T(F): 99.4 (13 Apr 2022 04:00), Max: 99.6 (12 Apr 2022 18:00)  HR: 108 (13 Apr 2022 06:00) (67 - 160)  BP: 136/69 (13 Apr 2022 06:00) (124/76 - 172/75)  BP(mean): 90 (13 Apr 2022 06:00) (90 - 122)  RR: 25 (13 Apr 2022 06:00) (15 - 27)  SpO2: 94% (13 Apr 2022 06:00) (94% - 100%)    PHYSICAL EXAM:  GENERAL: no acute distress, appears tired  NECK: Supple  CHEST/LUNG: Clear to auscultation bilaterally; No wheeze  HEART: irregular rate and rhythm  ABDOMEN: Soft, Nontender, Nondistended; Bowel sounds present  EXTREMITIES:  No clubbing, cyanosis, or edema  PSYCH: AAOx3      CAPILLARY BLOOD GLUCOSE      POCT Blood Glucose.: 334 mg/dL (13 Apr 2022 05:47)  POCT Blood Glucose.: 351 mg/dL (12 Apr 2022 22:27)  POCT Blood Glucose.: 351 mg/dL (12 Apr 2022 22:26)  POCT Blood Glucose.: 359 mg/dL (12 Apr 2022 12:12)    I&O's Summary    12 Apr 2022 07:01  -  13 Apr 2022 07:00  --------------------------------------------------------  IN: 2050 mL / OUT: 900 mL / NET: 1150 mL              LABS:                        7.9    60.31 )-----------( 40       ( 13 Apr 2022 03:36 )             22.7     04-13    140  |  102  |  14  ----------------------------<  331<H>  3.0<L>   |  24  |  0.77    Ca    9.1      13 Apr 2022 03:36  Phos  2.0     04-13  Mg     1.60     04-12    TPro  5.7<L>  /  Alb  2.9<L>  /  TBili  0.6  /  DBili  x   /  AST  26  /  ALT  13  /  AlkPhos  60  04-13    PT/INR - ( 12 Apr 2022 13:45 )   PT: 16.4 sec;   INR: 1.41 ratio         PTT - ( 12 Apr 2022 13:45 )  PTT:26.1 sec              RADIOLOGY & ADDITIONAL TESTS:

## 2022-04-13 NOTE — CHART NOTE - NSCHARTNOTEFT_GEN_A_CORE
MAR Accept Note  Transfer to:    Accepting Attending Physician:    Assigned Room:      Labs and data reviewed.   No findings precluding transfer of service.       HPI/MICU COURSE:   Please refer to MICU transfer note for full details. Briefly, this is a 89F w/ T2DM, HTN presents to the ED for palpitations x 3 days and weakness x 1 day. She felt very weak and lightheaded while standing today, feeling like she would pass out so this prompted her to come to the ED. Recently she also had a cough x 3 weeks. She was seen by PMD who felt this was likely a URI. Pt continued to have cough with weakness and HR in 140s. Pt denies any fever, chills, SOB, chest pain, or abd pain. She did not take her medications this morning because she was feeling unwell. She also does not take her metformin regularly.     In ED, pt afib rvr HR 160s, satting well on RA, BP hypertensive, RR 14. Labs significant for + with 88% blasts concerning for acute leukemia. Heme consulted in ED, plan for leukophoresis. MICU consulted and accepted patient for urgent leukophoresis.     Received CHIOMA bro and leukophoresis with improvement in counts. Flow cytometry showed APML. Bone marrow bx done on 4/13. patient improved symptoms and now ready for transfer to floors.               FOR FOLLOW-UP:  [ ] possible transfer to [facility] for hematology  [ ] hem recs  [ ] BM bx results  [ ] trend blood sugars due to being on decadron, restarted on insulin long acting today.   [ ] PE found - monitor for now given thrombocytopenia  [ ] duplex lower extremities.   [ ] trend TLS labs q8hrs  [ ] trend DIC labs q8hrs

## 2022-04-13 NOTE — DISCHARGE NOTE PROVIDER - HOSPITAL COURSE
89F w/ T2DM, HTN presents to the ED for palpitations x 3 days and weakness x 1 day. She felt very weak and lightheaded while standing today, feeling like she would pass out so this prompted her to come to the ED. Recently she also had a cough x 3 weeks. She was seen by PMD who felt this was likely a URI. Pt continued to have cough with weakness and HR in 140s. Pt denies any fever, chills, SOB, chest pain, or abd pain. She did not take her medications this morning because she was feeling unwell. She also does not take her metformin regularly.     In ED, pt afib rvr HR 160s, satting well on RA, BP hypertensive, RR 14. Labs significant for + with 88% blasts concerning for acute leukemia. Heme consulted in ED, plan for leukophoresis. MICU consulted and accepted patient for urgent leukophoresis.     Received RIJ shiley and leukophoresis with improvement in counts. Flow cytometry showed APML. Bone marrow bx done on 4/13. patient improved symptoms and now ready for transfer to floors.    heparin drip started. RIJ shiley removed. patient with planned transfer to [facility].      89F w/ T2DM, HTN presents to the ED for palpitations x 3 days and weakness x 1 day. She felt very weak and lightheaded while standing today, feeling like she would pass out so this prompted her to come to the ED. Recently she also had a cough x 3 weeks. She was seen by PMD who felt this was likely a URI. Pt continued to have cough with weakness and HR in 140s. Pt denies any fever, chills, SOB, chest pain, or abd pain. She did not take her medications this morning because she was feeling unwell. She also does not take her metformin regularly.     In ED, pt in a-fib w/ RVR HR 160s, satting well on RA, BP hypertensive, RR 14. Labs significant for + with 88% blasts concerning for acute leukemia. Heme consulted in ED, patient transferred to MICU for urgent leukophoresis. Received RIJ shiley and leukophoresis with improvement in counts. Flow cytometry showed APML. Bone marrow bx done on 4/13. Symptoms improved, transferred to medicine floor for further management.    Heparin drip started. RIJ shiley removed.    Case discussed with  ___ on 4/14. Patient is medically stable and optimized for discharge as per attending. Discharge plan reviewed with patient and/or family. 89F w/ T2DM, HTN presents to the ED for palpitations x 3 days and weakness x 1 day. She felt very weak and lightheaded while standing today, feeling like she would pass out so this prompted her to come to the ED. Recently she also had a cough x 3 weeks. She was seen by PMD who felt this was likely a URI. Pt continued to have cough with weakness and HR in 140s. Pt denies any fever, chills, SOB, chest pain, or abd pain. She did not take her medications this morning because she was feeling unwell. She also does not take her metformin regularly.     In ED, pt in a-fib w/ RVR HR 160s, satting well on RA, BP hypertensive, RR 14. Labs significant for + with 88% blasts concerning for acute leukemia. Heme consulted in ED, patient transferred to MICU for urgent leukophoresis. Received RIJ shiley and leukophoresis with improvement in counts. Flow cytometry showed APML. Bone marrow bx done on 4/13. Symptoms improved, transferred to medicine floor for further management.    Heparin drip started. RIJ shiley removed.    Afib in RVR  - hemodynamically stable; unclear if new or chronic   - c/w home verapamil 240 mg daily, metoprolol succinate 25 mg daily  - labetalol 5 IV PRN for HR > 130  - CHADsVASC at least 5, would benefit from AC when stable/no planned procedures    HTN   - monitor BP and if she continues to be hypertensive, start home hydrochlorothiazide 25mg daily    PE  - CTA 4/12 with R subsegmental PE  - c/w heparin gtt    Hyperuricemia  - monitor w/ TLS labs  - s/p rasburicase 3 mg x 1 per heme  - c/w hydroxyurea 1000 mg q8 hrs per heme  - c/w allopurinol 300 mg daily started 4/13    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 Renteria on 4/14. Patient is medically stable and optimized for discharge as per attending. Discharge plan reviewed with patient. 89F w/ T2DM, HTN presents to the ED for palpitations x 3 days and weakness x 1 day. She felt very weak and lightheaded while standing today, feeling like she would pass out so this prompted her to come to the ED. Recently she also had a cough x 3 weeks. She was seen by PMD who felt this was likely a URI. Pt continued to have cough with weakness and HR in 140s. Pt denies any fever, chills, SOB, chest pain, or abd pain. She did not take her medications this morning because she was feeling unwell. She also does not take her metformin regularly.     In ED, pt in a-fib w/ RVR HR 160s, satting well on RA, BP hypertensive, RR 14. Labs significant for + with 88% blasts concerning for acute leukemia. Heme consulted in ED, patient transferred to MICU for urgent leukophoresis. Received RIJ shiley and leukophoresis with improvement in counts. Flow cytometry showed APML. Bone marrow bx done on 4/13. Symptoms improved, transferred to medicine floor for further management.    Heparin drip started. RIJ shiley removed.    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  - s/p leukophoresis  - monitor TLS labs (CMP, phos, uric acid, LDH) q8 and DIC labs (fibrinogen and d-dimer) q8 per heme   - f/u G6PD, hepatitis panel, hep b core total  - transfer to [facility] for further treatment     Acute respiratory failure with hypoxia   - Likely d/t acute PE and pulmonary edema   - CXR 4/14 with pulmonary edema and bilateral pleural effusions  - TTE w/ grossly normal LV and RV systolic function   - Anticoagulation as below   - s/p Lasix 40mg IVP w/ improvement, monitor volume status and re-dose Lasix prn   - Hold IV fluids   - Supplemental O2, currently on 3L nc.    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 Renteria on 4/14. Patient is medically stable and optimized for transfer to [facility] as per attending. Discharge plan reviewed with patient.

## 2022-04-13 NOTE — CONSULT NOTE ADULT - ASSESSMENT
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     #Acute Leukemia:   WBC elevated at 231k, with 88% blasts.   Patient is anemic with Hgb of 6.8 and thrombocytopenic with platelet count of 59k.  Peripheral smear was reviewed which found large quantity of blast cells and lymphocytes, no Antoni rods. She meets criteria for acute leukemia and requires urgent inpatient treatment.  Plan of care was discussed with MICU fellow and blood bank. The patient was accepted to MICU, and is planned to undergo leukopheresis.   Flow cytometry was sent off by Dr. Chua for evaluation of acute leukemia.   Give hydrea 1000mg now and continue q8h.  Uric acid is elevated at 12.1. Give rasburicase 3mg now.   Start IVNS at 100cc/hr and monitor volume status. Can give lasix PRN to keep the patient euvolemic.   Start Allopurinol 300mg daily now   Order coags, G6PD, hepatitis panel- hepatitis B surface Ab/surface Ag/core antibody total and hepatitis C, HIV   - Please order TTE  - Check CBC w/ DIFF Daily   - Maintain active type and screen  - Check: TLS labs and DIC labs q8h- CMP, Phos, LDH, uric acid, fibrinogen, D-dimer  - Transfuse if Hgb < 7.0.  Transfuse for platelet count < 50k pending result of PML-rob.  - Goal fibrinogen is over 150. If fibrinogen is under 150, please notify our team.     Plan of care was explained in detail with the patient and her granddaughter Radha at bedside.    Please do not hesitate to page with questions.     Ykui Harden MD PGY4   [phone]  Hematology-Oncology Fellow  
Patient is a 89y old  Female who presents with a chief complaint of Acute Blast Crisis (13 Apr 2022 07:42) Was called for cytoreduction. Consent was obtained from the patient over the phone after explaining the risk and benefits.   Patient tolerated the procedure well. No complications.

## 2022-04-14 PROBLEM — Z86.79 PERSONAL HISTORY OF OTHER DISEASES OF THE CIRCULATORY SYSTEM: Chronic | Status: ACTIVE | Noted: 2022-01-01

## 2022-04-14 PROBLEM — E11.9 TYPE 2 DIABETES MELLITUS WITHOUT COMPLICATIONS: Chronic | Status: ACTIVE | Noted: 2022-01-01

## 2022-04-14 PROBLEM — I48.20 CHRONIC ATRIAL FIBRILLATION, UNSPECIFIED: Chronic | Status: ACTIVE | Noted: 2022-01-01

## 2022-04-14 NOTE — H&P ADULT - HISTORY OF PRESENT ILLNESS
89F w/ T2DM, HTN who initially presented to the [facility] ED for palpitations x 3 days and weakness x 1 day. She felt very weak and lightheaded while standing today, feeling like she would pass out so this prompted her to come to the ED. Recently she also had a cough x 3 weeks. She was seen by PMD who felt this was likely a URI. Pt continued to have cough with weakness and HR in 140s. Pt denies any fever, chills, SOB, chest pain, or and pain. She did not take her medications this morning because she was feeling unwell. She also does not take her metformin regularly.      In [facility] ED, pt in a-fib w/ RVR HR 160s, saturating well on RA, BP hypertensive, RR 14. Labs significant for + with 88% blasts concerning for acute leukemia. Heme consulted in ED, patient transferred to MICU for urgent leukaphereses. Received RIJ shiley and leukapheresis with improvement in counts. Flow cytometry showed AML. Bone marrow bx done on 4/13. Symptoms improved, transferred to medicine floor for further management. Now transferred to [facility] for further management

## 2022-04-14 NOTE — PROGRESS NOTE ADULT - PROBLEM SELECTOR PLAN 4
New onset Afib w/ RVR  HR improved   Continue w/ Metoprolol and Verapamil   On anticoagulation w/ heparin gtt   TTE w/ grossly normal left and right ventricular systolic function, mild MR

## 2022-04-14 NOTE — H&P ADULT - NSHPSOCIALHISTORY_GEN_ALL_CORE
Lives with daughter Cheyenne and grand daughter. Has 4 children total. Patient denied ETOH or Tobacco use past/present. Patient stated that if she was unable to make decisions for herself, she would want her daughter Cheyenne Griffith to make decisions for

## 2022-04-14 NOTE — DISCHARGE NOTE PROVIDER - NSDCMRMEDTOKEN_GEN_ALL_CORE_FT
allopurinol 300 mg oral tablet: 1 tab(s) orally once a day  dexamethasone 6 mg/25 mL-NaCl 0.9% intravenous solution: 41.67 milliliter(s) intravenous every 12 hours  heparin 100 units/mL-D5% intravenous solution: 13ml/hr    25,000U in dextrose 5% 250mL  hydroxyurea 500 mg oral capsule: 2 cap(s) orally every 8 hours  insulin glargine 100 units/mL subcutaneous solution: 15 unit(s) subcutaneous once a day (at bedtime)  insulin lispro 100 units/mL injectable solution: 5 unit(s) injectable 3 times a day (before meals)  insulin lispro 100 units/mL injectable solution: insulin sliding scale    2 Unit(s) if Glucose 151 - 200  4 Unit(s) if Glucose 201 - 250  6 Unit(s) if Glucose 251 - 300  8 Unit(s) if Glucose 301 - 350  10 Unit(s) if Glucose 351 - 400  12 Unit(s) if Glucose Greater Than 400  insulin lispro 100 units/mL injectable solution: 1 unit(s) injectable once a day (at bedtime) - 250  1 Unit(s) if Glucose 251 - 300  2 Unit(s) if Glucose 301 - 350  3 Unit(s) if Glucose 351 - 400  4 Unit(s) if Glucose Greater Than 400  metoprolol tartrate 50 mg oral tablet: 1 tab(s) orally 2 times a day  pantoprazole 40 mg oral granule, delayed release: 40 milligram(s) orally once a day    while on decadron  tretinoin 10 mg oral capsule: 4 cap(s) orally every 12 hours  verapamil 240 mg/12 hours oral tablet, extended release: 1 tab(s) orally once a day   allopurinol 300 mg oral tablet: 1 tab(s) orally once a day  dexamethasone 6 mg/25 mL-NaCl 0.9% intravenous solution: 41.67 milliliter(s) intravenous every 12 hours  heparin 100 units/mL-D5% intravenous solution: 13ml/hr    25,000U in dextrose 5% 250mL  hydroxyurea 500 mg oral capsule: 2 cap(s) orally every 8 hours  insulin glargine 100 units/mL subcutaneous solution: 15 unit(s) subcutaneous once a day (at bedtime)  insulin lispro 100 units/mL injectable solution: 5 unit(s) injectable 3 times a day (before meals)  insulin lispro 100 units/mL injectable solution: insulin sliding scale    2 Unit(s) if Glucose 151 - 200  4 Unit(s) if Glucose 201 - 250  6 Unit(s) if Glucose 251 - 300  8 Unit(s) if Glucose 301 - 350  10 Unit(s) if Glucose 351 - 400  12 Unit(s) if Glucose Greater Than 400  insulin lispro 100 units/mL injectable solution: 1 unit(s) injectable once a day (at bedtime) - 250  1 Unit(s) if Glucose 251 - 300  2 Unit(s) if Glucose 301 - 350  3 Unit(s) if Glucose 351 - 400  4 Unit(s) if Glucose Greater Than 400  metoprolol tartrate 50 mg oral tablet: 1 tab(s) orally 2 times a day  pantoprazole 40 mg oral granule, delayed release: 40 milligram(s) orally once a day    while on decadron  posaconazole 100 mg oral delayed release tablet: 3 tab(s) orally once a day   tretinoin 10 mg oral capsule: 4 cap(s) orally every 12 hours  venetoclax 50 mg oral tablet: 2 tab(s) orally once a day   verapamil 240 mg/12 hours oral tablet, extended release: 1 tab(s) orally once a day   allopurinol 300 mg oral tablet: 1 tab(s) orally once a day  dexamethasone 6 mg/25 mL-NaCl 0.9% intravenous solution: 41.67 milliliter(s) intravenous every 12 hours  heparin 100 units/mL-D5% intravenous solution: 13ml/hr    25,000U in dextrose 5% 250mL  insulin glargine 100 units/mL subcutaneous solution: 15 unit(s) subcutaneous once a day (at bedtime)  insulin lispro 100 units/mL injectable solution: 5 unit(s) injectable 3 times a day (before meals)  insulin lispro 100 units/mL injectable solution: insulin sliding scale    2 Unit(s) if Glucose 151 - 200  4 Unit(s) if Glucose 201 - 250  6 Unit(s) if Glucose 251 - 300  8 Unit(s) if Glucose 301 - 350  10 Unit(s) if Glucose 351 - 400  12 Unit(s) if Glucose Greater Than 400  insulin lispro 100 units/mL injectable solution: 1 unit(s) injectable once a day (at bedtime) - 250  1 Unit(s) if Glucose 251 - 300  2 Unit(s) if Glucose 301 - 350  3 Unit(s) if Glucose 351 - 400  4 Unit(s) if Glucose Greater Than 400  metoprolol tartrate 50 mg oral tablet: 1 tab(s) orally 2 times a day  pantoprazole 40 mg oral granule, delayed release: 40 milligram(s) orally once a day    while on decadron  posaconazole 100 mg oral delayed release tablet: 3 tab(s) orally once a day   venetoclax 100 mg oral tablet: 1 tab(s) orally once a day  verapamil 240 mg/12 hours oral tablet, extended release: 1 tab(s) orally once a day

## 2022-04-14 NOTE — DISCHARGE NOTE PROVIDER - HOSPITAL COURSE
89F w/ T2DM, HTN who initially presented to the [facility] ED for palpitations x 3 days and weakness x 1 day. She felt very weak and lightheaded while standing today, feeling like she would pass out so this prompted her to come to the ED. Recently she also had a cough x 3 weeks. She was seen by PMD who felt this was likely a URI. Pt continued to have cough with weakness and HR in 140s. Pt denies any fever, chills, SOB, chest pain, or and pain. She did not take her medications this morning because she was feeling unwell. She also does not take her metformin regularly.      In [facility] ED, pt in a-fib w/ RVR HR 160s, saturating well on RA, BP hypertensive, RR 14. Labs significant for + with 88% blasts concerning for acute leukemia. Heme consulted in ED, patient transferred to MICU for urgent leukaphereses. Received RIJ shiley and leukapheresis with improvement in counts. Flow cytometry showed AML. Bone marrow bx done on 4/13. Symptoms improved, transferred to medicine floor for further management. Now transferred to [facility] for further management 89F w/ T2DM, HTN who initially presented to the [facility] ED for palpitations x 3 days and weakness x 1 day. She felt very weak and lightheaded while standing today, feeling like she would pass out so this prompted her to come to the ED. Recently she also had a cough x 3 weeks. She was seen by PMD who felt this was likely a URI. Pt continued to have cough with weakness and HR in 140s. Pt denies any fever, chills, SOB, chest pain, or and pain. She did not take her medications this morning because she was feeling unwell. She also does not take her metformin regularly.    In [facility] ED, pt in a-fib w/ RVR HR 160s, saturating well on RA, BP hypertensive, RR 14. Labs significant for + with 88% blasts concerning for acute leukemia. Heme consulted in ED, patient transferred to MICU for urgent leukaphereses. Received RIJ shiley and leukapheresis with improvement in counts. Flow cytometry showed AML. Bone marrow bx done on 4/13. Symptoms improved, transferred to medicine floor for further management. Now transferred to [facility] for further management.  During the hospitalization a PICC line was placed, continued on Heparin drip with APTT/plateelts check Q 6 hrs. Patient received Dacogen/Venetocalx,

## 2022-04-14 NOTE — PROGRESS NOTE ADULT - PROBLEM SELECTOR PROBLEM 3
[facility]  [address]  [place] [address]  Phone: [phone]                   Jin Ngo        [address] [address]                            [facility] Sched[facility]   [address] [place] [address] [phone]    Date 7/30/20    Dear  Jin Ngo    An order for the following procedure(s) colonoscopy was placed for you by .     Please call the number listed to schedule this procedure or cancel the order.    If you have already scheduled this appointment, please disregard this letter.      Sincerely,    [facility]  [phone]     Acute pulmonary embolism

## 2022-04-14 NOTE — PROGRESS NOTE ADULT - SUBJECTIVE AND OBJECTIVE BOX
PROGRESS NOTE:     Patient is a 89y old  Female who presents with a chief complaint of Acute Blast Crisis (14 Apr 2022 10:34)      SUBJECTIVE / OVERNIGHT EVENTS: Patient says she feels better this AM. Denies any pain. Denies any shortness of breath at rest.     ADDITIONAL REVIEW OF SYSTEMS:    MEDICATIONS  (STANDING):  allopurinol 300 milliGRAM(s) Oral daily  chlorhexidine 2% Cloths 1 Application(s) Topical daily  dextrose 5%. 1000 milliLiter(s) (50 mL/Hr) IV Continuous <Continuous>  dextrose 5%. 1000 milliLiter(s) (100 mL/Hr) IV Continuous <Continuous>  dextrose 50% Injectable 25 Gram(s) IV Push once  dextrose 50% Injectable 12.5 Gram(s) IV Push once  dextrose 50% Injectable 25 Gram(s) IV Push once  dextrose Oral Gel 15 Gram(s) Oral once  glucagon  Injectable 1 milliGRAM(s) IntraMuscular once  heparin   Injectable 5000 Unit(s) SubCutaneous once  heparin  Infusion.  Unit(s)/Hr (13 mL/Hr) IV Continuous <Continuous>  hydroxyurea 1000 milliGRAM(s) Oral every 8 hours  insulin glargine Injectable (LANTUS) 15 Unit(s) SubCutaneous at bedtime  insulin lispro (ADMELOG) corrective regimen sliding scale   SubCutaneous three times a day before meals  insulin lispro (ADMELOG) corrective regimen sliding scale   SubCutaneous at bedtime  insulin lispro Injectable (ADMELOG) 5 Unit(s) SubCutaneous three times a day before meals  lidocaine 2% (Preservative-free) Injectable 20 milliLiter(s) Local Injection once  metoprolol tartrate 50 milliGRAM(s) Oral two times a day  pantoprazole   Suspension 40 milliGRAM(s) Oral daily  verapamil  milliGRAM(s) Oral daily    MEDICATIONS  (PRN):  benzonatate 100 milliGRAM(s) Oral three times a day PRN Cough  heparin   Injectable 6000 Unit(s) IV Push every 6 hours PRN For aPTT less than 40  heparin   Injectable 3000 Unit(s) IV Push every 6 hours PRN For aPTT between 40 - 57      CAPILLARY BLOOD GLUCOSE      POCT Blood Glucose.: 407 mg/dL (14 Apr 2022 08:20)  POCT Blood Glucose.: 400 mg/dL (14 Apr 2022 08:19)  POCT Blood Glucose.: 299 mg/dL (13 Apr 2022 21:36)  POCT Blood Glucose.: 380 mg/dL (13 Apr 2022 17:38)    I&O's Summary    13 Apr 2022 07:01  -  14 Apr 2022 07:00  --------------------------------------------------------  IN: 1889 mL / OUT: 1650 mL / NET: 239 mL        PHYSICAL EXAM:  Vital Signs Last 24 Hrs  T(C): 36.9 (14 Apr 2022 12:26), Max: 37.3 (13 Apr 2022 16:00)  T(F): 98.4 (14 Apr 2022 12:26), Max: 99.1 (13 Apr 2022 16:00)  HR: 85 (14 Apr 2022 12:26) (76 - 152)  BP: 122/57 (14 Apr 2022 12:26) (103/63 - 183/112)  BP(mean): 93 (13 Apr 2022 23:00) (91 - 117)  RR: 18 (14 Apr 2022 12:26) (15 - 24)  SpO2: 97% (14 Apr 2022 12:26) (90% - 100%)    CONSTITUTIONAL: NAD  RESPIRATORY: Normal respiratory effort; coarse breath sounds   CARDIOVASCULAR: Irregular rate and rhythm, normal S1 and S2, no murmur/rub/gallop; No lower extremity edema; Peripheral pulses are 2+ bilaterally  ABDOMEN: Nontender to palpation, normoactive bowel sounds, no rebound/guarding; No hepatosplenomegaly  MUSCLOSKELETAL: no clubbing or cyanosis of digits; no joint swelling or tenderness to palpation  PSYCH: A+O to person, place, and time; affect appropriate    LABS:                        7.8    54.84 )-----------( 86       ( 14 Apr 2022 03:13 )             21.5     04-14    139  |  100  |  18  ----------------------------<  293  3.5   |  23  |  0.73    Ca    8.3        Phos  5.9  Mg     1.2      PT/INR - ( 14 Apr 2022 05:34 )   PT: 20.9 sec;   INR: 1.79 ratio         PTT - ( 14 Apr 2022 05:34 )  PTT:72.3 sec          Culture - Blood (collected 12 Apr 2022 18:28)  Source: .Blood Blood-Peripheral  Preliminary Report (13 Apr 2022 19:01):    No growth to date.    Culture - Blood (collected 12 Apr 2022 18:28)  Source: .Blood Blood-Peripheral  Preliminary Report (13 Apr 2022 19:01):    No growth to date.        RADIOLOGY & ADDITIONAL TESTS:  Results Reviewed:   Imaging Personally Reviewed:  Electrocardiogram Personally Reviewed:    COORDINATION OF CARE:  Care Discussed with Consultants/Other Providers [Y/N]:  Prior or Outpatient Records Reviewed [Y/N]:   PROGRESS NOTE:     Patient is a 89y old  Female who presents with a chief complaint of Acute Blast Crisis (14 Apr 2022 10:34)      SUBJECTIVE / OVERNIGHT EVENTS: Patient seen this AM at 10:00 am prior to transfer. She says she feels better this AM. Denies any pain. Shortness of breath improved.     ADDITIONAL REVIEW OF SYSTEMS:    MEDICATIONS  (STANDING):  allopurinol 300 milliGRAM(s) Oral daily  chlorhexidine 2% Cloths 1 Application(s) Topical daily  dextrose 5%. 1000 milliLiter(s) (50 mL/Hr) IV Continuous <Continuous>  dextrose 5%. 1000 milliLiter(s) (100 mL/Hr) IV Continuous <Continuous>  dextrose 50% Injectable 25 Gram(s) IV Push once  dextrose 50% Injectable 12.5 Gram(s) IV Push once  dextrose 50% Injectable 25 Gram(s) IV Push once  dextrose Oral Gel 15 Gram(s) Oral once  glucagon  Injectable 1 milliGRAM(s) IntraMuscular once  heparin   Injectable 5000 Unit(s) SubCutaneous once  heparin  Infusion.  Unit(s)/Hr (13 mL/Hr) IV Continuous <Continuous>  hydroxyurea 1000 milliGRAM(s) Oral every 8 hours  insulin glargine Injectable (LANTUS) 15 Unit(s) SubCutaneous at bedtime  insulin lispro (ADMELOG) corrective regimen sliding scale   SubCutaneous three times a day before meals  insulin lispro (ADMELOG) corrective regimen sliding scale   SubCutaneous at bedtime  insulin lispro Injectable (ADMELOG) 5 Unit(s) SubCutaneous three times a day before meals  lidocaine 2% (Preservative-free) Injectable 20 milliLiter(s) Local Injection once  metoprolol tartrate 50 milliGRAM(s) Oral two times a day  pantoprazole   Suspension 40 milliGRAM(s) Oral daily  verapamil  milliGRAM(s) Oral daily    MEDICATIONS  (PRN):  benzonatate 100 milliGRAM(s) Oral three times a day PRN Cough  heparin   Injectable 6000 Unit(s) IV Push every 6 hours PRN For aPTT less than 40  heparin   Injectable 3000 Unit(s) IV Push every 6 hours PRN For aPTT between 40 - 57      CAPILLARY BLOOD GLUCOSE      POCT Blood Glucose.: 407 mg/dL (14 Apr 2022 08:20)  POCT Blood Glucose.: 400 mg/dL (14 Apr 2022 08:19)  POCT Blood Glucose.: 299 mg/dL (13 Apr 2022 21:36)  POCT Blood Glucose.: 380 mg/dL (13 Apr 2022 17:38)    I&O's Summary    13 Apr 2022 07:01  -  14 Apr 2022 07:00  --------------------------------------------------------  IN: 1889 mL / OUT: 1650 mL / NET: 239 mL        PHYSICAL EXAM:  Vital Signs Last 24 Hrs  T(C): 36.9 (14 Apr 2022 12:26), Max: 37.3 (13 Apr 2022 16:00)  T(F): 98.4 (14 Apr 2022 12:26), Max: 99.1 (13 Apr 2022 16:00)  HR: 85 (14 Apr 2022 12:26) (76 - 152)  BP: 122/57 (14 Apr 2022 12:26) (103/63 - 183/112)  BP(mean): 93 (13 Apr 2022 23:00) (91 - 117)  RR: 18 (14 Apr 2022 12:26) (15 - 24)  SpO2: 97% (14 Apr 2022 12:26) (90% - 100%)    CONSTITUTIONAL: NAD  RESPIRATORY: Normal respiratory effort; coarse breath sounds   CARDIOVASCULAR: Irregular rate and rhythm, normal S1 and S2, no murmur/rub/gallop; No lower extremity edema; Peripheral pulses are 2+ bilaterally  ABDOMEN: Nontender to palpation, normoactive bowel sounds, no rebound/guarding; No hepatosplenomegaly  MUSCLOSKELETAL: no clubbing or cyanosis of digits; no joint swelling or tenderness to palpation  PSYCH: A+O to person, place, and time; affect appropriate    LABS:                        7.8    54.84 )-----------( 86       ( 14 Apr 2022 03:13 )             21.5     04-14    139  |  100  |  18  ----------------------------<  293  3.5   |  23  |  0.73    Ca    8.3        Phos  5.9  Mg     1.2      PT/INR - ( 14 Apr 2022 05:34 )   PT: 20.9 sec;   INR: 1.79 ratio         PTT - ( 14 Apr 2022 05:34 )  PTT:72.3 sec          Culture - Blood (collected 12 Apr 2022 18:28)  Source: .Blood Blood-Peripheral  Preliminary Report (13 Apr 2022 19:01):    No growth to date.    Culture - Blood (collected 12 Apr 2022 18:28)  Source: .Blood Blood-Peripheral  Preliminary Report (13 Apr 2022 19:01):    No growth to date.        RADIOLOGY & ADDITIONAL TESTS:  Results Reviewed:   Imaging Personally Reviewed:  Electrocardiogram Personally Reviewed:    COORDINATION OF CARE:  Care Discussed with Consultants/Other Providers [Y/N]:  Prior or Outpatient Records Reviewed [Y/N]:

## 2022-04-14 NOTE — PROGRESS NOTE ADULT - SUBJECTIVE AND OBJECTIVE BOX
INTERVAL HPI/OVERNIGHT EVENTS:  Patient S&E at bedside. Overnight, patient desatted on NC to the 80's and was placed on NRB.     VITAL SIGNS:  T(F): 98.1 (04-14-22 @ 04:50)  HR: 91 (04-14-22 @ 07:31)  BP: 103/63 (04-14-22 @ 07:31)  RR: 20 (04-14-22 @ 07:31)  SpO2: 100% (04-14-22 @ 07:31)  Wt(kg): --    PHYSICAL EXAM:    Constitutional: NAD  Eyes: EOMI, sclera non-icteric  Neck: supple  Respiratory: CTAB, no wheezes or crackles   Cardiovascular: RRR  Gastrointestinal: soft, NTND, + BS  Extremities: no cyanosis, clubbing or edema   Neurological: awake and alert      MEDICATIONS  (STANDING):  allopurinol 300 milliGRAM(s) Oral daily  chlorhexidine 2% Cloths 1 Application(s) Topical daily  dexAMETHasone  IVPB 10 milliGRAM(s) IV Intermittent every 12 hours  dextrose 5%. 1000 milliLiter(s) (50 mL/Hr) IV Continuous <Continuous>  dextrose 5%. 1000 milliLiter(s) (100 mL/Hr) IV Continuous <Continuous>  dextrose 50% Injectable 25 Gram(s) IV Push once  dextrose 50% Injectable 12.5 Gram(s) IV Push once  dextrose 50% Injectable 25 Gram(s) IV Push once  dextrose Oral Gel 15 Gram(s) Oral once  glucagon  Injectable 1 milliGRAM(s) IntraMuscular once  heparin   Injectable 5000 Unit(s) SubCutaneous once  heparin  Infusion.  Unit(s)/Hr (13 mL/Hr) IV Continuous <Continuous>  hydroxyurea 1000 milliGRAM(s) Oral every 8 hours  insulin glargine Injectable (LANTUS) 15 Unit(s) SubCutaneous at bedtime  insulin lispro (ADMELOG) corrective regimen sliding scale   SubCutaneous three times a day before meals  insulin lispro (ADMELOG) corrective regimen sliding scale   SubCutaneous at bedtime  insulin lispro Injectable (ADMELOG) 5 Unit(s) SubCutaneous three times a day before meals  lidocaine 2% (Preservative-free) Injectable 20 milliLiter(s) Local Injection once  metoprolol tartrate 50 milliGRAM(s) Oral two times a day  pantoprazole   Suspension 40 milliGRAM(s) Oral daily  tretinoin 40 milliGRAM(s) Oral every 12 hours  verapamil  milliGRAM(s) Oral daily    MEDICATIONS  (PRN):  benzonatate 100 milliGRAM(s) Oral three times a day PRN Cough  heparin   Injectable 6000 Unit(s) IV Push every 6 hours PRN For aPTT less than 40  heparin   Injectable 3000 Unit(s) IV Push every 6 hours PRN For aPTT between 40 - 57      Allergies    No Known Allergies    Intolerances        LABS:                        7.8    54.84 )-----------( 86       ( 14 Apr 2022 03:13 )             21.5     04-14    139  |  100  |  18  ----------------------------<  293<H>  3.5   |  23  |  0.73    Ca    8.3<L>      14 Apr 2022 03:13  Phos  5.9     04-14  Mg     1.20     04-14    TPro  6.4  /  Alb  3.4  /  TBili  1.4<H>  /  DBili  x   /  AST  40<H>  /  ALT  25  /  AlkPhos  64  04-14    PT/INR - ( 14 Apr 2022 05:34 )   PT: 20.9 sec;   INR: 1.79 ratio         PTT - ( 14 Apr 2022 05:34 )  PTT:72.3 sec      RADIOLOGY & ADDITIONAL TESTS:  Studies reviewed.   INTERVAL HPI/OVERNIGHT EVENTS:  Patient S&E at bedside. Overnight, patient desatted on NC to the 80's and was placed on NRB, now on 3L.    VITAL SIGNS:  T(F): 98.1 (04-14-22 @ 04:50)  HR: 91 (04-14-22 @ 07:31)  BP: 103/63 (04-14-22 @ 07:31)  RR: 20 (04-14-22 @ 07:31)  SpO2: 100% (04-14-22 @ 07:31)  Wt(kg): --    PHYSICAL EXAM:    Constitutional: NAD  Eyes: EOMI, sclera non-icteric  Neck: supple  Respiratory: 3L NC. Coarse breath sounds  Cardiovascular: RRR  Gastrointestinal: soft, NTND, + BS  Extremities: no cyanosis, clubbing or edema   Neurological: awake and alert      MEDICATIONS  (STANDING):  allopurinol 300 milliGRAM(s) Oral daily  chlorhexidine 2% Cloths 1 Application(s) Topical daily  dexAMETHasone  IVPB 10 milliGRAM(s) IV Intermittent every 12 hours  dextrose 5%. 1000 milliLiter(s) (50 mL/Hr) IV Continuous <Continuous>  dextrose 5%. 1000 milliLiter(s) (100 mL/Hr) IV Continuous <Continuous>  dextrose 50% Injectable 25 Gram(s) IV Push once  dextrose 50% Injectable 12.5 Gram(s) IV Push once  dextrose 50% Injectable 25 Gram(s) IV Push once  dextrose Oral Gel 15 Gram(s) Oral once  glucagon  Injectable 1 milliGRAM(s) IntraMuscular once  heparin   Injectable 5000 Unit(s) SubCutaneous once  heparin  Infusion.  Unit(s)/Hr (13 mL/Hr) IV Continuous <Continuous>  hydroxyurea 1000 milliGRAM(s) Oral every 8 hours  insulin glargine Injectable (LANTUS) 15 Unit(s) SubCutaneous at bedtime  insulin lispro (ADMELOG) corrective regimen sliding scale   SubCutaneous three times a day before meals  insulin lispro (ADMELOG) corrective regimen sliding scale   SubCutaneous at bedtime  insulin lispro Injectable (ADMELOG) 5 Unit(s) SubCutaneous three times a day before meals  lidocaine 2% (Preservative-free) Injectable 20 milliLiter(s) Local Injection once  metoprolol tartrate 50 milliGRAM(s) Oral two times a day  pantoprazole   Suspension 40 milliGRAM(s) Oral daily  tretinoin 40 milliGRAM(s) Oral every 12 hours  verapamil  milliGRAM(s) Oral daily    MEDICATIONS  (PRN):  benzonatate 100 milliGRAM(s) Oral three times a day PRN Cough  heparin   Injectable 6000 Unit(s) IV Push every 6 hours PRN For aPTT less than 40  heparin   Injectable 3000 Unit(s) IV Push every 6 hours PRN For aPTT between 40 - 57      Allergies    No Known Allergies    Intolerances        LABS:                        7.8    54.84 )-----------( 86       ( 14 Apr 2022 03:13 )             21.5     04-14    139  |  100  |  18  ----------------------------<  293<H>  3.5   |  23  |  0.73    Ca    8.3<L>      14 Apr 2022 03:13  Phos  5.9     04-14  Mg     1.20     04-14    TPro  6.4  /  Alb  3.4  /  TBili  1.4<H>  /  DBili  x   /  AST  40<H>  /  ALT  25  /  AlkPhos  64  04-14    PT/INR - ( 14 Apr 2022 05:34 )   PT: 20.9 sec;   INR: 1.79 ratio         PTT - ( 14 Apr 2022 05:34 )  PTT:72.3 sec      RADIOLOGY & ADDITIONAL TESTS:  Studies reviewed.

## 2022-04-14 NOTE — H&P ADULT - NSHPPHYSICALEXAM_GEN_ALL_CORE
GENERAL: NAD, well-developed  HEAD: Atraumatic, Normocephalic  EYES: EOMI, PERRLA, conjunctiva and sclera clear  NECK: Supple, No JVD  CHEST/LUNG: Coarse breath sounds. Currently on NC   HEART: Regular rate and rhythm  ABDOMEN: Soft, Nontender, Nondistended; Bowel sounds present  EXTREMITIES:  2+ dP pulses b/l, No clubbing, cyanosis, or edema  PSYCH: reactive affect  NEUROLOGY: AAOx3, non-focal      Vital Signs Last 24 Hrs  T(C): 36.9 (14 Apr 2022 12:26), Max: 37.3 (13 Apr 2022 16:00)  T(F): 98.4 (14 Apr 2022 12:26), Max: 99.1 (13 Apr 2022 16:00)  HR: 85 (14 Apr 2022 12:26) (76 - 152)  BP: 122/57 (14 Apr 2022 12:26) (103/63 - 183/112)  BP(mean): 93 (13 Apr 2022 23:00) (91 - 117)  RR: 18 (14 Apr 2022 12:26) (15 - 24)  SpO2: 97% (14 Apr 2022 12:26) (90% - 100%)

## 2022-04-14 NOTE — PATIENT PROFILE ADULT - FALL HARM RISK - HARM RISK INTERVENTIONS
Assistance with ambulation/Assistance OOB with selected safe patient handling equipment/Communicate Risk of Fall with Harm to all staff/Discuss with provider need for PT consult/Monitor gait and stability/Provide patient with walking aids - walker, cane, crutches/Reinforce activity limits and safety measures with patient and family/Sit up slowly, dangle for a short time, stand at bedside before walking/Tailored Fall Risk Interventions/Visual Cue: Yellow wristband and red socks/Bed in lowest position, wheels locked, appropriate side rails in place/Call bell, personal items and telephone in reach/Instruct patient to call for assistance before getting out of bed or chair/Non-slip footwear when patient is out of bed/[place] to call system/Physically safe environment - no spills, clutter or unnecessary equipment/Purposeful Proactive Rounding/Room/bathroom lighting operational, light cord in reach

## 2022-04-14 NOTE — PROVIDER CONTACT NOTE (OTHER) - ACTION/TREATMENT ORDERED:
Provider seen and evaluated patient. Reattached to O2 3LPM via nasal canula. O2 sat 97%. Lasix given as ordered.

## 2022-04-14 NOTE — PROGRESS NOTE ADULT - PROBLEM SELECTOR PLAN 6
Uncontrolled DM2 d/t medication non-compliance, also w/ steroid induced hyperglycemia   Hgb A1c 9.0  No longer on Decadron (last dose this AM)    Continue insulin sliding scale   On Lantus 15 units qhs and Admelog 5 units TID premeal, will need to up-titrate    Monitor fingersticks

## 2022-04-14 NOTE — DISCHARGE NOTE NURSING/CASE MANAGEMENT/SOCIAL WORK - NSDCPEFALRISK_GEN_ALL_CORE
For information on Fall & Injury Prevention, visit: [email or web address] OR  [email or web address] OR  https://www.cdc.gov/steadi/patient.html

## 2022-04-14 NOTE — PROGRESS NOTE ADULT - PROBLEM SELECTOR PLAN 5
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

## 2022-04-14 NOTE — H&P ADULT - NSHPREVIEWOFSYSTEMS_GEN_ALL_CORE
Constitutional: denies fevers, chills, night sweats, weight loss  HEENT: denies visual changes, hearing changes, rhinitis, odynophagia, or dysphagia  Cardiovascular: denies palpitations, chest pain, edema  Respiratory: stable SOB  Gastrointestinal: denies N/V/D, abdominal pain, hematochezia, melena  : denies dysuria, hematuria  MSK: +generalized weeakness  Neuro: no numbness or tingling  Skin: denies new rashes or masses Constitutional: denies fevers, chills, night sweats, weight loss  HEENT: denies visual changes, hearing changes, rhinitis, odynophagia, or dysphagia  Cardiovascular: denies palpitations, chest pain, edema  Respiratory: stable SOB  Gastrointestinal: denies N/V/D, abdominal pain, hematochezia, melena  : denies dysuria, hematuria  MSK: +generalized weakness  Neuro: no numbness or tingling  Skin: denies new rashes or masses

## 2022-04-14 NOTE — H&P ADULT - ASSESSMENT
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          AML   On admission, WBC elevated at 231k, with 88% blasts. Patient was anemic with Hgb of 6.8 and thrombocytopenic with platelet count of 59k. Peripheral smear was reviewed at [facility], which found lymphocytes, blasts, no Antoni rods.    -s/p leukopheresis on 4/13/22, and was started on hydrea 1000mg q8h.    -Continue hydrea 1000mg q8h for now.    -Peripheral Flow cytometry confirming CD33+, MPO+, CD34- AML. FLT3 and BCR-ABL pending   -Pathologist Dr. Chua identified Antoni rods, thus ATRA (tretinoin) was started on 4/13 40mg q12h. Due to concern for possible differentiation syndrome given the patient's leukocytosis and suspected myeloid malignancy, started dexamethasone 10mg q12h on 4/13. FISH now confirming negative PML-LOREN on 4/14. Dexamethasone and ATRA discontinued 4/14/22   - s/p BMBx on 4/13. Foundation sent   -Continue allopurinol 300mg PO qdaily.   - Maintain active type and screen   - Check: TLS labs and DIC labs q12h- CMP, Phos, LDH, uric acid   - Transfuse if Hgb < 7.0.  Transfuse for platelet count < 50k while on AC   - HIV negative.    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 Checking hep B surface Ag Ab and Hep B PCR   -TTE obtained 4/13 with EF of 65%; no gross ventricular abnormalities   -Plan is to start Dacogen and Venetoclax, patient consented in presence of her son Malu (bedside) and daughter Cheyenne (via phone) on 4/14/22. Plan is for dacogen on 4/15. Will not start Venetoclax until WBC <25K          Pulmonary Embolism   - CTA chest showing acute subsegmental pulmonary artery embolus in the right upper lobe.   - On heparin gtt for AC, can switch to Lovenox 1mg/kg BID    - Transfuse PLT as above to maintain >50k while on AC       Hyperphosphatemia   -Phos noted to be 5.9 on AM labs on 4/14   -Recommend STAT repeat   -If phos remains elevated, can start Renvela 800mg TID       Afib in RVR   - hemodynamically stable; patient denied history of prior afib   - c/w home verapamil 240 mg daily, metoprolol succinate 25 mg daily   - [id number] of 5, will need long term AC; currently on AC for PE      T2DM   - prescribed  metformin at home but nonadherent   - AILYN BROWNLEE qac, qhs    - A1c 9.0%          ETHICS   - Patient values quantity of life, and therefore would want all medical interventions including intubation and resuscitation. In terms of her suspected leukemia, she wants all available treatments including invasive procedures such as a shiley and chemotherapy if indicated.    - She designated her daughter Cheyenne Griffith [phone] as her health care proxy in the event that she is unable to make her own medical decisions.

## 2022-04-14 NOTE — PROGRESS NOTE ADULT - ASSESSMENT
89F h/o HTN, DM, uterine ca s/p FRED, and arthritis p/w generalized weakness and dyspnea x 2 wks found to have acute blast crisis requiring leukophoresis, afib w/ RVR, and PE.

## 2022-04-14 NOTE — PROVIDER CONTACT NOTE (OTHER) - ASSESSMENT
Patient restless, anxious and short of breath. Lungs coarse. BP elevated and HR elevated. O2 sat  90% in 2LPM nasal canula.

## 2022-04-14 NOTE — DISCHARGE NOTE NURSING/CASE MANAGEMENT/SOCIAL WORK - PATIENT PORTAL LINK FT
You can access the FollowMyHealth Patient Portal offered by [facility] by registering at the following website: [email or web address]. By joining [facility]’s FollowMyHealth portal, you will also be able to view your health information using other applications (apps) compatible with our system.

## 2022-04-14 NOTE — H&P ADULT - ATTENDING COMMENTS
88 yo female with MHx sig for T2DM, HTN  here with newly diagnosed AML. She initially presented to PMD with severe weakness, unable to walk, WBC count > 200k. 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 Initial presentation complicated by tumor lysis syndrome, now s/p leukapheresis, rasburicase.  [facility] sent -- PENDING.  Plan for Decitabine + venetoclax (however, would hold venetoclax until WBC < 25k)  Continue on hydrea 1 g q8 hrs for now until WBC < 25k, likely DC on Friday or saturday  Day 1 planned for 4/15 with decitabine alone until WBC < 25k    - TTE with normal LVEF, ? new Afib with episodes of RVR, now on verapamil, follow-up AC when plts consistently > 50k with treatment of AML  - Would increase IVF to 75 cc/hr given TLS  - Shiley for leukapheresis can be removed tomorrow if WBC continue to trend in downward direction  - Continue hydrea and monitor cbc with diff, LDH, uric acid twice daily  - Coags once a day  - Thrombocytopenia: Transfuse to keep Plt >10k or > 15k if febrile or > 50k if bleeding  - Anemia: Transfuse to maintain Hb > 7.0   - Neutropenic ppx: when ANC < 1000 start levaquin/ posa  - Maintain good oral hygiene, OOB to chair  - PT eval 89 yo female with MHx sig for T2DM, HTN  here with newly diagnosed AML. She initially presented to PMD with severe weakness, unable to walk, WBC count > 200k. 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 Initial presentation complicated by tumor lysis syndrome, now s/p leukapheresis, rasburicase.  [facility] sent -- PENDING.  Plan for Decitabine + venetoclax (however, would hold venetoclax until WBC < 25k)  Continue on hydrea 1 g q8 hrs for now until WBC < 25k, likely DC on Friday or saturday  Day 1 planned for 4/15 with decitabine alone until WBC < 25k    - CT chest w contrast 4/12/22: Acute subsegmental pulmonary artery embolus in the right upper lobe. ?right heart strain, patchy bilateral upper lobe opacities may be infectious or inflammatory.  - VTE / PE: Likely exacerbated by severely elevated WBC, now on heparin drip, monitor coags / PTT closely, plts now > 80k, would hold heparin drip / AC if plts go < 40k, would check venous dopplers, if clot present would place IVC filter given that she will need to go off heparin with treatment-related thrombocytopenia  - Infectious workup: No current infectious sx, while at [facility] on 4/12/22 Blood Cx, RVP, UA were negative  - TTE with normal LVEF, ? new Afib with episodes of RVR, now on verapamil, follow-up oral DOAC when plts consistently > 50k with treatment of AML  - TLS: Would increase IVF to 75 cc/hr given TLS, s/p rasburicase on 4/12/22  - Shiley for leukapheresis can be removed tomorrow if WBC continue to trend in downward direction  - Continue hydrea and monitor cbc with diff, LDH, uric acid twice daily  - Thrombocytopenia: Transfuse to keep Plt >10k or > 15k if febrile or > 50k if bleeding  - Anemia: Transfuse to maintain Hb > 7.0   - Neutropenic ppx: when ANC < 1000 start levaquin/ posa  - Maintain good oral hygiene, OOB to chair  - PT eval

## 2022-04-14 NOTE — CHART NOTE - NSCHARTNOTEFT_GEN_A_CORE
89F h/o HTN DM uterine ca s/p FRED and arthritis p/w generalied weakness and dyspnea x2wks found to have acute blast crisis requiring leukophoresis, afib w/ rvr. PE started on hep gtt. Patient s/p MICU now transferred to medicine floor.     Notified by RN that pt desatted on 2L NC to low 80s and was placed on NRB satting at 100%. Pt seen and assessed at bedside. Pt states the SOB has improved on the NRB. Of note, when pt was in MICU, pt received IV Lasix for fluid overload. Pt currently not on lasix. Denies any fever, chills, chest pain, abdominal pain, N/V/D, dysuria, melena, hematochezia. Pt was deescalated to 3L NC and now satting at 97%. SBP initally in 180s but now 145/77. Temp 98.1. HR elevated to 125.     T(C): 36.7 (04-14-22 @ 04:50), Max: 37.3 (04-13-22 @ 16:00)  HR: 127 (04-14-22 @ 05:05) (89 - 152)  BP: 145/77 (04-14-22 @ 05:05) (115/61 - 183/112)  RR: 22 (04-14-22 @ 05:05) (14 - 25)  SpO2: 97% (04-14-22 @ 05:05) (86% - 100%)    Constitutional: NAD, well-developed, well-nourished  Respiratory: Crackles heard bilaterally   Cardiovascular: regular rate and rhythm, S1 and S2, no murmurs, rubs or gallops, no edema, 2+ peripheral pulses  Gastrointestinal: soft, nontender, nondistended, +bowel sounds, no hernia  Psychiatric: A&Ox3, appropriate mood, affect    Plan:  IV Lasix 40mg x1   Will get CXR  Will give PO Metoprolol 50mg now  Will continue to monitor

## 2022-04-14 NOTE — PROGRESS NOTE ADULT - PROBLEM SELECTOR PLAN 2
Likely d/t acute PE and pulmonary edema   CXR 4/14 with pulmonary edema and bilateral pleural effusions  TTE w/ grossly normal LV and RV systolic function   Anticoagulation as below   s/p Lasix 40mg IVP this AM, monitor volume status and re-dose Lasix prn   Hold IV fluids   Supplemental O2, currently on 3L nc

## 2022-04-14 NOTE — PROGRESS NOTE ADULT - PROBLEM SELECTOR PLAN 3
Acute subsegmental pulmonary artery embolus in the right upper lobe  Continue w/ heparin gtt, monitor PTT  No evidence of R heart strain on TTE

## 2022-04-14 NOTE — PATIENT PROFILE ADULT - FALL HARM RISK - HARM RISK INTERVENTIONS
Assistance with ambulation/Assistance OOB with selected safe patient handling equipment/Communicate Risk of Fall with Harm to all staff/Discuss with provider need for PT consult/Monitor gait and stability/Provide patient with walking aids - walker, cane, crutches/Reinforce activity limits and safety measures with patient and family/Sit up slowly, dangle for a short time, stand at bedside before walking/Tailored Fall Risk Interventions/Visual Cue: Yellow wristband and red socks/Bed in lowest position, wheels locked, appropriate side rails in place/Call bell, personal items and telephone in reach/Instruct patient to call for assistance before getting out of bed or chair/Non-slip footwear when patient is out of bed/[place] to call system/Physically safe environment - no spills, clutter or unnecessary equipment/Purposeful Proactive Rounding/Room/bathroom lighting operational, light cord in reach

## 2022-04-14 NOTE — PROGRESS NOTE ADULT - PROBLEM SELECTOR PLAN 1
-On admission, WBC elevated at 231k, with 88% blasts.  -s/p leukopheresis on 4/13/22, and was started on hydrea 1000mg q8h  -Peripheral Flow cytometry confirming CD33+, MPO+, CD34- AML   -s/p BMBx on 4/13  -Continue allopurinol 300mg PO qdaily  -monitor TLS labs   -Transferring to [facility] for further management

## 2022-04-14 NOTE — H&P ADULT - NSHPLABSRESULTS_GEN_ALL_CORE
Personally reviewed labs.   Personally reviewed imaging.   Personally reviewed EKG.                           7.8    54.84 )-----------( 86       ( 14 Apr 2022 03:13 )             21.5       04-14    121<L>  |  84<L>  |  20  ----------------------------<  345<H>  3.4<L>   |  16<L>  |  0.81    Ca    7.4<L>      14 Apr 2022 10:19  Phos  7.3     04-14  Mg     8.90     04-14    TPro  5.5<L>  /  Alb  2.9<L>  /  TBili  1.2  /  DBili  x   /  AST  37<H>  /  ALT  19  /  AlkPhos  57  04-14      LIVER FUNCTIONS - ( 14 Apr 2022 10:19 )  Alb: 2.9 g/dL / Pro: 5.5 g/dL / ALK PHOS: 57 U/L / ALT: 19 U/L / AST: 37 U/L / GGT: x             PT/INR - ( 14 Apr 2022 05:34 )   PT: 20.9 sec;   INR: 1.79 ratio         PTT - ( 14 Apr 2022 05:34 )  PTT:72.3 sec    PERIPHERAL FLOW   Peripheral Flow showed myeloblasts (82% of cells), positive for myeloperoxidase, HLA-DR, CD38, CD33, CD13, CD15, CD64, CD11b, partial/heterogenous CD14, MPO; negative for CD7, CD2, MPO, , CD34, Tdt, CD19. This immunophenotype is consistent with acute myeloid leukemia. A proportion of the blasts show twisted nuclei/binucleation with some cytoplasmic granules and Antoni rods.  PML-LOREN FISH was normal.       < from: Transthoracic Echocardiogram (04.13.22 @ 14:05) >    Patient name: MARIAELENA BAER  YOB: 1932   Age: 89 (F)   MR#: [id number]  Study Date: 4/13/2022Ejection Fraction (Modified Mcclellan Rule): 65 %CONCLUSIONS:  1. Mitral annular calcification, otherwise normal mitral  valve. Mild mitral regurgitation.  2. Normal left ventricular internal dimensions and wall  thicknesses.  3. Endocardium not well visualized; grossly normal left  ventricular systolic function.  4. The right ventricle is not well visualized; grossly  normal right ventricular systolic function.    < end of copied text >         CTA CHEST 4/12     IMPRESSION: Acute subsegmental pulmonary artery embolus in the right upper lobe. Question right heart strain, echo recommended for complete evaluation. Patchy bilateral upper lobe opacities may be infectious or inflammatory.

## 2022-04-14 NOTE — PROGRESS NOTE ADULT - ASSESSMENT
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     #AML  -On admission, WBC elevated at 231k, with 88% blasts. Patient was anemic with Hgb of 6.8 and thrombocytopenic with platelet count of 59k.  -Peripheral smear was reviewed, which found lymphocytes, blasts, no Antoni rods.   -s/p leukopheresis on 4/13/22, and was started on hydrea 1000mg q8h.   -Continue hydrea 1000mg q8h for now.   -Peripheral Flow cytometry confirming CD33+, MPO+, CD34- AML  - FLT3 and BCR-ABL pending  - Pathologist Dr. Chua identified Antoni rods, thus ATRA (tretinoin) was started on 4/13 40mg q12h. Due to concern for possible differentiation syndrome given the patient's leukocytosis and suspected myeloid malignancy, started dexamethasone 10mg q12h on 4/13. FISH now confirming negative PML-LOREN on 4/14.   - s/p BMBx on 4/13. [facility] sent  - Continue to check CBC w/ DIFF and coags daily   -Continue allopurinol 300mg PO qdaily.  -Continue IVNS at 100cc/hr and monitor volume status. Can give lasix PRN to keep the patient euvolemic.   - Maintain active type and screen  - Check: TLS labs and DIC labs q8h- CMP, Phos, LDH, uric acid, fibrinogen, D-dimer d  - Transfuse if Hgb < 7.0.  Transfuse for platelet count < 50k while on AC  - Goal fibrinogen is over 150. If fibrinogen is under 150, please notify our team.   -HIV negative.   -G6PD pending  -hepatitis panel showing Hep B core Ab positive. Checking hep B surface Ag Ab and Hep B PCR  -TTE pending      #Pulmonary embolism:  - Continue heparin gtt for full dose anticoagulation.   - Transfuse PLT as above to maintain >50k while on AC    #Hyperphosphatemia  -Phos noted to be 5.9 on AM labs on 4/14  -Recommend STAT repeat  -If phos remains elevated, can start Renvela 800mg TID    **Patient accepted for transfer to [facility] by Dr. Eriberto Sneed MD  Hematology/Oncology Fellow, PGY-5  Pager: [phone]  After 5pm and on weekends please page on-call fellow   This patient is an 88yo lady with PMH of htn and DM who presents to the ED with complaint of three weeks of progressive generalized weakness, found to have acute leukemia.     #AML  -On admission, WBC elevated at 231k, with 88% blasts. Patient was anemic with Hgb of 6.8 and thrombocytopenic with platelet count of 59k.  -Peripheral smear was reviewed, which found lymphocytes, blasts, no Antoni rods.   -s/p leukopheresis on 4/13/22, and was started on hydrea 1000mg q8h.   -Continue hydrea 1000mg q8h for now.   -Peripheral Flow cytometry confirming CD33+, MPO+, CD34- AML  - FLT3 and BCR-ABL pending  - Pathologist Dr. Chua identified Antoni rods, thus ATRA (tretinoin) was started on 4/13 40mg q12h. Due to concern for possible differentiation syndrome given the patient's leukocytosis and suspected myeloid malignancy, started dexamethasone 10mg q12h on 4/13. FISH now confirming negative PML-LOREN on 4/14. We will discontinue dexamethasone and ATRA today  - s/p BMBx on 4/13. [facility] sent  - Continue to check CBC w/ DIFF and coags daily   -Continue allopurinol 300mg PO qdaily.  -Continue IVNS at 100cc/hr and monitor volume status. Can give lasix PRN to keep the patient euvolemic.   - Maintain active type and screen  - Check: TLS labs and DIC labs q8h- CMP, Phos, LDH, uric acid, fibrinogen, D-dimer d  - Transfuse if Hgb < 7.0.  Transfuse for platelet count < 50k while on AC  - Goal fibrinogen is over 150. If fibrinogen is under 150, please notify our team.   -HIV negative.   -G6PD pending  -hepatitis panel showing Hep B core Ab positive. Checking hep B surface Ag Ab and Hep B PCR  -TTE pending      #Pulmonary embolism:  - Continue heparin gtt for full dose anticoagulation.   - Transfuse PLT as above to maintain >50k while on AC    #Hyperphosphatemia  -Phos noted to be 5.9 on AM labs on 4/14  -Recommend STAT repeat  -If phos remains elevated, can start Renvela 800mg TID    **Patient accepted for transfer to [facility] by Dr. Eriberto Sneed MD  Hematology/Oncology Fellow, PGY-5  Pager: [phone]  After 5pm and on weekends please page on-call fellow

## 2022-04-15 NOTE — CHART NOTE - NSCHARTNOTEFT_GEN_A_CORE
Medicine PA Episodic Note    Patient is a 89y old  Female who presents with a chief complaint of AML (14 Apr 2022 18:11)    Notified by RN of pt. having one episode of emesis w/ small streaks of blood. Pt. seen and examined at bedside, resting, AOx3, in NAD, c/o gas pain. Pt. states she intermittently gets gas pain at home and takes baking soda for it. Pt. states the Maalox and simethicone she received earlier haven't given her relief. Pt. states she induced a gag reflex and intentionally vomited to see if it would assist with alleviating the gas pain, however is still experiencing it. Pt. pointing to her upper epigastric area. Additionally states he last BM was 1 day ago and she is passing gas. Pt. denies lightheadedness, dizziness, HA, blurry vision, vision changes, CP, palpitations, difficulty breathing, nausea.     Vital Signs Last 24 Hrs  T(C): 37.1 (15 Apr 2022 04:09), Max: 37.1 (15 Apr 2022 04:09)  T(F): 98.8 (15 Apr 2022 04:09), Max: 98.8 (15 Apr 2022 04:09)  HR: 100 (15 Apr 2022 01:23) (61 - 152)  BP: 148/85 (15 Apr 2022 04:09) (103/63 - 183/112)  BP(mean): --  RR: 18 (15 Apr 2022 04:09) (18 - 24)  SpO2: 99% (15 Apr 2022 04:09) (90% - 100%)      Labs:                          7.0    19.83 )-----------( 50       ( 15 Apr 2022 01:22 )             20.1     04-14    138  |  98  |  27<H>  ----------------------------<  337<H>  3.4<L>   |  23  |  0.86    Ca    7.9<L>      14 Apr 2022 15:09  Phos  6.8     04-14  Mg     1.6     04-14    TPro  6.4  /  Alb  3.2<L>  /  TBili  1.3<H>  /  DBili  x   /  AST  45<H>  /  ALT  25  /  AlkPhos  65  04-14        Radiology:  PENDING    Physical Exam:  General: WN/WD NAD  Neurology: A&Ox3, nonfocal, CARLISLE x 4  Head:  Normocephalic, atraumatic  Respiratory: CTA B/L  CV: RRR, S1S2, no murmur  Abdominal: Soft, NT, ND no palpable mass, + BS in all 4 quadrants  MSK: No edema, + peripheral pulses, FROM all 4 extremity    Assessment & Plan:  HPI:  89F w/ T2DM, HTN who initially presented to the [facility] ED for palpitations x 3 days and weakness x 1 day. She felt very weak and lightheaded while standing today, feeling like she would pass out so this prompted her to come to the ED. Recently she also had a cough x 3 weeks. She was seen by PMD who felt this was likely a URI. Pt continued to have cough with weakness and HR in 140s. Pt denies any fever, chills, SOB, chest pain, or and pain. She did not take her medications this morning because she was feeling unwell. She also does not take her metformin regularly.      In [facility] ED, pt in a-fib w/ RVR HR 160s, saturating well on RA, BP hypertensive, RR 14. Labs significant for + with 88% blasts concerning for acute leukemia. Heme consulted in ED, patient transferred to MICU for urgent leukaphereses. Received RIJ shiley and leukapheresis with improvement in counts. Flow cytometry showed AML. Bone marrow bx done on 4/13. Symptoms improved, transferred to medicine floor for further management. Now transferred to [facility] for further management   (14 Apr 2022 14:19)    Now w/ recurrent gas pain.    Plan:  #Gas pain w/ emesis  - C/W Maalox PRN  - Abdominal xray  - HOB adjusted, supportive measures  - Monitor VS  - F/U AM labs  - Consider Zofran if QTC is WNL  - Consider GI c/s if pt.'s symptoms persist or worsen  - Will endorse to day team in AM    Follow up with Attending in AM.    Brandan Rose PA-C  Department of Medicine  [facility] [id number]

## 2022-04-15 NOTE — CONSULT NOTE ADULT - CONVERSATION DETAILS
A conversation was held to address the patient's communication preferences and medical decision making structure.  Preferences for receiving information: Pt wants to receive all information  Preferences for who can receive this information: Children and granddaughter Radha may receive all information  Preferences for information about prognosis: She would like to receive any information about prognosis  Preferences for making medical decisions: She prefers for her children to make decisions on her behalf   Health care agent or Surrogate identification: All four children are surrogates at this time. No HCP in the chart  Review of the Family Health Care Decision Act: [ x] Yes  [ ] No  Advance directive completion [ ] Yes  [ x] No [ ] Completed previously and is in the chart/EHR  [ ] Other  She is interested in making her daughter Cheyenne the HCP, and having another child be the alternate.  [facility] referral made for HCP assignment      ACP >16 min

## 2022-04-15 NOTE — PROGRESS NOTE ADULT - PROBLEM SELECTOR PLAN 7
Neutropenic, if febrile pan culture f/u culture results. Change Levaquin to Cefepime.  4/15- Start Levaquin and Posaconazole tody for prophylaxis.

## 2022-04-15 NOTE — PROGRESS NOTE ADULT - NS ATTEND AMEND GEN_ALL_CORE FT
89 yo woman admitted with palpitations, weakness, hyperleukocytosis.  WBC 230K.  WBC down post apheresis, HU.  CT +PE, pleural effusions, on civ heparin  Abd pain 4/14, now resolved.  X ray NSGP.  Neutropenic  HU stopped  started dac/lg  day 1 today (started lg 4/14)  PICC line  cont heparin, keep PTT around 60  tx plts to keep over 40 so that heparin  can be maintained for at least two wks  ppx abs with levaquin, posa  dose adjusted lg  TLS labs, I and O, diurese as needed  OOB

## 2022-04-15 NOTE — CONSULT NOTE ADULT - SUBJECTIVE AND OBJECTIVE BOX
HPI:  89F w/ T2DM, HTN who initially presented to the [facility] ED for palpitations x 3 days and weakness x 1 day. She felt very weak and lightheaded while standing today, feeling like she would pass out so this prompted her to come to the ED. Recently she also had a cough x 3 weeks. She was seen by PMD who felt this was likely a URI. Pt continued to have cough with weakness and HR in 140s. Pt denies any fever, chills, SOB, chest pain, or and pain. She did not take her medications this morning because she was feeling unwell. She also does not take her metformin regularly.      In [facility] ED, pt in a-fib w/ RVR HR 160s, saturating well on RA, BP hypertensive, RR 14. Labs significant for + with 88% blasts concerning for acute leukemia. Heme consulted in ED, patient transferred to MICU for urgent leukaphereses. Received RIJ shiley and leukapheresis with improvement in counts. Flow cytometry showed AML. Bone marrow bx done on 4/13. Symptoms improved, transferred to medicine floor for further management. Now transferred to [address] for further management   (14 Apr 2022 14:19)      An extensive conversation was held with the patient and her children Brooke and Giovanni.  I provided a contemporary and accurate explanation of palliative medicine.  We discussed the role of palliative medicine in enhancing the quality of life for patients living with serious medical illness, the role of the interdisciplinary team in addressing symptoms, coping, and healthcare navigation, and the impact of palliative care along the illness trajectory. Explained impact of concurrent palliative      Palliative Global Assessment  A review of the paper chart has been conducted  HCP form present:               Yes and valid []               Yes but invalid []                No [x]   MOLST present:                   Yes and valid []               Yes but invalid []                No [x]  Incapacity form present:   Yes and valid []                Yes but invalid []                No []                 N/A [x]  Living Will:                            Yes and valid []                Yes but invalid []                No [x]      Family Health Care Decision Act Surrogate Decision Maker Hierarchy  [facility] Article 81 Guardian-->Spouse or domestic partner--> Adult child-->Parent-->Sibling--> Close friend      Contacts listed in the paper or electronic chart  Name Brooke  Relationship: Daughter  Translation Required: None  Spouse or Partner:   Family unit: Four children  Edwina Amanda  Malu Blake  Family history of hematologic malignancy: 0  Residence: [ ] Apartment   [x] House  [ ] Other  Degree of functionality in the home: moderate  Performance Status (PPSv2): 70%  BMI:BMI (kg/m2): 32.8 (04-15-22 @ 11:57), 29.6 (04-12-22 @ 18:00)  Engagement in the home: Does not use second floor due to stairs  Employment: [ ] Currently employed [ ] Exited workforce due to illness  [ ] Retired prior to illness  [ ] No/distant history of employment ; She is a former A  Support network (degree):  ---Family:        [ ] Low  [ ] Moderate  [x ] High  ---Neighbors: [x ] Low  [ ] Moderate  [ ] High  ---Social:         [ ] Low  [ x] Moderate  [ x] High  ---Spiritual:    [x ] Low  [ ] Moderate  [ ] High  Financial concerns: None  Coping Strategies: spending time with family  Caregiver burden/fatigue/needs: granddaughter who is a PCA resides with her  Grief identified: [] Yes [x] No  Medical communication and decision making preferences: See GOC Note        PERTINENT PM/SXH:   H/O: HTN (hypertension)    Diabetes mellitus    Chronic atrial fibrillation      No significant past surgical history    H/O: hysterectomy      FAMILY HISTORY:  No pertinent family history in first degree relatives      ITEMS NOT CHECKED ARE NOT PRESENT    SOCIAL HISTORY:   Significant other/partner[ ]  Children[x ]  Temple/Spirituality:  Substance hx:  [ ]   Tobacco hx:  [ ]   Alcohol hx: [ ]   Home Opioid hx:  [ ] I-Stop Reference No:  Living Situation: [ x]Home  [ ]Long term care  [ ]Rehab [ ]Other    ADVANCE DIRECTIVES:    DNR  MOLST  [ ]  Living Will  [ ]   DECISION MAKER(s):  [ ] Health Care Proxy(s)  [x ] Surrogate(s)  [ ] Guardian           Name(s): Phone Number(s):    BASELINE (I)ADL(s) (prior to admission):  Smith: [ ]Total  [ ] Moderate [ ]Dependent    Allergies    No Known Allergies    Intolerances    MEDICATIONS  (STANDING):  allopurinol 300 milliGRAM(s) Oral daily  benzonatate 100 milliGRAM(s) Oral every 8 hours  Biotene Dry Mouth Oral Rinse 15 milliLiter(s) Swish and Spit three times a day  calcium acetate 667 milliGRAM(s) Oral four times a day with meals  decitabine IVPB (eMAR) 35 milliGRAM(s) IV Intermittent every 24 hours  dextrose 5%. 1000 milliLiter(s) (50 mL/Hr) IV Continuous <Continuous>  dextrose 5%. 1000 milliLiter(s) (100 mL/Hr) IV Continuous <Continuous>  dextrose 50% Injectable 25 Gram(s) IV Push once  dextrose 50% Injectable 12.5 Gram(s) IV Push once  dextrose 50% Injectable 25 Gram(s) IV Push once  furosemide   Injectable 40 milliGRAM(s) IV Push once  glucagon  Injectable 1 milliGRAM(s) IntraMuscular once  insulin glargine Injectable (LANTUS) 15 Unit(s) SubCutaneous at bedtime  insulin lispro (ADMELOG) corrective regimen sliding scale   SubCutaneous three times a day before meals  insulin lispro (ADMELOG) corrective regimen sliding scale   SubCutaneous at bedtime  insulin lispro Injectable (ADMELOG) 5 Unit(s) SubCutaneous three times a day before meals  levoFLOXacin  Tablet 500 milliGRAM(s) Oral every 24 hours  metoprolol tartrate 50 milliGRAM(s) Oral two times a day  ondansetron Injectable 8 milliGRAM(s) IV Push every 24 hours  pantoprazole    Tablet 40 milliGRAM(s) Oral before breakfast  posaconazole DR Tablet 300 milliGRAM(s) Oral daily  potassium chloride    Tablet ER 20 milliEquivalent(s) Oral every 2 hours  sodium chloride 0.9%. 1000 milliLiter(s) (50 mL/Hr) IV Continuous <Continuous>  venetoclax 20 milliGRAM(s) Oral once  verapamil  milliGRAM(s) Oral daily    MEDICATIONS  (PRN):  acetaminophen     Tablet .. 650 milliGRAM(s) Oral every 6 hours PRN Temp greater or equal to 38C (100.4F), Mild Pain (1 - 3)  aluminum hydroxide/magnesium hydroxide/simethicone Suspension 30 milliLiter(s) Oral every 4 hours PRN Dyspepsia  dextrose Oral Gel 15 Gram(s) Oral once PRN Blood Glucose LESS THAN 70 milliGRAM(s)/deciliter    PRESENT SYMPTOMS: [ ]Unable to obtain due to poor mentation   Source if other than patient:  [ ]Family   [ ]Team     Pain: [ ]yes [ x]no  QOL impact -   Location -                    Aggravating factors -  Quality -  Radiation -  Timing-  Severity (0-10 scale):  Minimal acceptable level (0-10 scale):     PAIN AD Score:     [email or web address] (press ctrl +  left click to view)    Dyspnea:                           [ ]Mild [ ]Moderate [ ]Severe  Anxiety:                             [ ]Mild [ ]Moderate [ ]Severe  Fatigue:                             [ ]Mild [ x]Moderate [x ]Severe  Nausea:                             [ ]Mild [ ]Moderate [ ]Severe  Loss of appetite:              [ ]Mild [ ]Moderate [ ]Severe  Constipation:                    [ ]Mild [ ]Moderate [ ]Severe    Other Symptoms: moderate drowsiness  Dyspnea the night prior to admission  Symptoms of reflux  [x ]All other review of systems negative     Palliative Performance Status Version 2:      70   %    http://npcrc.org/files/news/palliative_performance_scale_ppsv2.pdf  PHYSICAL EXAM:  Vital Signs Last 24 Hrs  T(C): 36.4 (15 Apr 2022 14:10), Max: 37.2 (15 Apr 2022 09:45)  T(F): 97.5 (15 Apr 2022 14:10), Max: 99 (15 Apr 2022 09:45)  HR: 83 (15 Apr 2022 14:46) (61 - 100)  BP: 123/67 (15 Apr 2022 14:46) (94/51 - 148/85)  BP(mean): --  RR: 18 (15 Apr 2022 14:10) (18 - 20)  SpO2: 99% (15 Apr 2022 14:10) (93% - 100%) I&O's Summary    14 Apr 2022 07:01  -  15 Apr 2022 07:00  --------------------------------------------------------  IN: 743 mL / OUT: 1400 mL / NET: -657 mL    15 Apr 2022 07:01  -  15 Apr 2022 15:04  --------------------------------------------------------  IN: 120 mL / OUT: 250 mL / NET: -130 mL      GENERAL:  [x ]Alert  [x ]Oriented x  3  [ ]Lethargic  [ ]Cachexia  [ ]Unarousable  [ ]Verbal  [ ]Non-Verbal  Behavioral:   [ ] Anxiety  [ ] Delirium [ ] Agitation [x ] Other Calm  HEENT:  [x ]Normal   [ ]Dry mouth   [ ]ET Tube/Trach  [ ]Oral lesions  PULMONARY:   [x ]Clear [ ]Tachypnea  [ ]Audible excessive secretions   [ ]Rhonchi        [ ]Right [ ]Left [ ]Bilateral  [ ]Crackles        [ ]Right [ ]Left [ ]Bilateral  [ ]Wheezing     [ ]Right [ ]Left [ ]Bilatera  [ ]Diminished breath sounds [ ]right [ ]left [ ]bilateral  CARDIOVASCULAR:    [ x]Regular [ ]Irregular [ ]Tachy  [ ]Bertram [ ]Murmur [ ]Other  GASTROINTESTINAL:  [ x]Soft  [ ]Distended   [ ]+BS  [x ]Non tender [ ]Tender  [ ]PEG [ ]OGT/ NGT  Last BM:   GENITOURINARY:  [ x]Normal [ ] Incontinent   [ ]Oliguria/Anuria   [ ]Molina  MUSCULOSKELETAL:   [x ]Normal   [ ]Weakness  [ ]Bed/Wheelchair bound [ ]Edema  NEUROLOGIC:   [ x]No focal deficits  [ ]Cognitive impairment  [ ]Dysphagia [ ]Dysarthria [ ]Paresis [ ]Other   SKIN:   [x ]Normal    [ ]Rash  [ ]Pressure ulcer(s)       Present on admission [ ]y [ ]n    CRITICAL CARE:  [ ] Shock Present  [ ]Septic [ ]Cardiogenic [ ]Neurologic [ ]Hypovolemic  [ ]  Vasopressors [ ]  Inotropes   [ ]Respiratory failure present [ ]Mechanical ventilation [ ]Non-invasive ventilatory support [ ]High flow  [ ]Acute  [ ]Chronic [ ]Hypoxic  [ ]Hypercarbic [ ]Other  [ ]Other organ failure     LABS:                        6.8    8.93  )-----------( 33       ( 15 Apr 2022 09:41 )             20.3   04-15    138  |  98  |  28<H>  ----------------------------<  285<H>  3.2<L>   |  24  |  0.77    Ca    7.8<L>      15 Apr 2022 09:41  Phos  4.6     04-15  Mg     1.7     04-15    TPro  6.5  /  Alb  3.4  /  TBili  1.3<H>  /  DBili  x   /  AST  42<H>  /  ALT  30  /  AlkPhos  61  04-15  PT/INR - ( 14 Apr 2022 15:09 )   PT: 23.0 sec;   INR: 1.99 ratio         PTT - ( 15 Apr 2022 09:41 )  PTT:134.3 sec      RADIOLOGY & ADDITIONAL STUDIES:    PROTEIN CALORIE MALNUTRITION PRESENT: [ ]mild [ ]moderate [ ]severe [ ]underweight [ ]morbid obesity  https://www.andeal.org/vault/2440/web/files/ONC/Table_Clinical%20Characteristics%20to%20Document%20Malnutrition-White%20JV%20et%20al%202012.pdf    Height (cm): 154.9 (04-15-22 @ 11:57), 157.5 (04-12-22 @ 18:00)  Weight (kg): 78.8 (04-15-22 @ 11:57), 73.5 (04-12-22 @ 18:00)  BMI (kg/m2): 32.8 (04-15-22 @ 11:57), 29.6 (04-12-22 @ 18:00)    [ ]PPSV2 < or = to 30% [ ]significant weight loss  [ ]poor nutritional intake  [ ]anasarca      [ ]Artificial Nutrition      REFERRALS:   [ ]Chaplaincy  [ ]Hospice  [ ]Child Life  [ ]Social Work  [ ]Case management [ ]Holistic Therapy     Goals of Care Document:

## 2022-04-15 NOTE — CONSULT NOTE ADULT - PROBLEM SELECTOR RECOMMENDATION 5
Current functional PPSv2: 70  Nursing care required: Some assistance with ADLs  Code status documented: Full Code  A review of the paper chart has been conducted  HCP form present:               Yes and valid []               Yes but invalid []                No [x]   MOLST present:                   Yes and valid []               Yes but invalid []                No [x]  Incapacity form present:   Yes and valid []                Yes but invalid []                No []                 N/A [x]x  Living Will:                            Yes and valid []                Yes but invalid []                No [x]      Family Health Care Decision Act (FHCDA) Surrogate Decision Maker Hierarchy in the absence of a health care agent  [facility] Article 81 Guardian-->Spouse or domestic partner--> Adult child-->Parent-->Sibling--> Close friend

## 2022-04-15 NOTE — PROGRESS NOTE ADULT - PROBLEM SELECTOR PLAN 2
CTA chest showing acute subsegmental pulmonary artery embolus in the right upper lobe.   On heparin gtt for AC, until platelet counts 40 K CTA chest showing acute subsegmental pulmonary artery embolus in the right upper lobe.   On heparin gtt for AC, until platelet counts 40 K  4/15- platelet -33, transfuse one unit platelets, and f/u platelet count , if platelet count >40 resume Heparin full A/C

## 2022-04-15 NOTE — PROGRESS NOTE ADULT - ASSESSMENT
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     #AML  -On admission, WBC elevated at 231k, with 88% blasts. Patient was anemic with Hgb of 6.8 and thrombocytopenic with platelet count of 59k.  -Peripheral smear was reviewed, which found lymphocytes, blasts, no Antoni rods.   -s/p leukopheresis on 4/13/22, and was started on hydrea 1000mg q8h.   -Continue hydrea 1000mg q8h for now.   -Peripheral Flow cytometry confirming CD33+, MPO+, CD34- AML  - FLT3 and BCR-ABL pending  - Pathologist Dr. Chua identified Antoni rods, thus ATRA (tretinoin) was started on 4/13 40mg q12h. Due to concern for possible differentiation syndrome given the patient's leukocytosis and suspected myeloid malignancy, started dexamethasone 10mg q12h on 4/13. FISH now confirming negative PML-LOREN on 4/14. We will discontinue dexamethasone and ATRA today  - s/p BMBx on 4/13. Foundation sent  - Continue to check CBC w/ DIFF and coags daily   -Continue allopurinol 300mg PO qdaily.  -Continue IVNS at 100cc/hr and monitor volume status. Can give lasix PRN to keep the patient euvolemic.   - Maintain active type and screen  - Check: TLS labs and DIC labs q8h- CMP, Phos, LDH, uric acid, fibrinogen, D-dimer d  - Transfuse if Hgb < 7.0.  Transfuse for platelet count < 50k while on AC  - Goal fibrinogen is over 150. If fibrinogen is under 150, please notify our team.   -HIV negative.   -G6PD pending  -hepatitis panel showing Hep B core Ab positive. Checking hep B surface Ag Ab and Hep B PCR  -TTE pending      #Pulmonary embolism:  - Continue heparin gtt for full dose anticoagulation.   - Transfuse PLT as above to maintain >50k while on AC    #Hyperphosphatemia  -Phos noted to be 5.9 on AM labs on 4/14  -Recommend STAT repeat  -If phos remains elevated, can start Renvela 800mg TID    **Patient accepted for transfer to [facility] by Dr. Eriberto Sneed MD  Hematology/Oncology Fellow, PGY-5  Pager: [phone]  After 5pm and on weekends please page on-call fellow   This is an 89F w/ T2DM, HTN who initially presented to the [facility] ED for palpitations x 3 days and weakness x 1 day. In [facility] ED, pt in a-fib w/ RVR HR 160s, saturating well on RA, BP hypertensive, RR 14. Labs significant for + with 88% blasts concerning for acute leukemia. Heme consulted in ED, patient transferred to MICU for urgent leukaphereses, s/p  CHIOMA bro and leukapheresis with improvement in counts, flow cytometry showed AML, bone marrow bx done on 4/13 awaiting result.       This is an 89F w/ T2DM, HTN who initially presented to the [facility] ED for palpitations x 3 days and weakness x 1 day. In [facility] ED, pt in a-fib w/ RVR HR 160s, saturating well on RA, BP hypertensive, RR 14. Labs significant for + with 88% blasts concerning for acute leukemia. Heme consulted in ED, patient transferred to MICU for urgent leukaphereses, s/p  CHIOMA bro and leukapheresis with improvement in counts, flow cytometry showed AML, bone marrow bx done on 4/13 awaiting result. 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

## 2022-04-15 NOTE — PROVIDER CONTACT NOTE (OTHER) - ASSESSMENT
Poor PO intake and does not want to eat lunch. Fingerstick was 236. I informed Robert Canela NP that according to order she was due to receive 9 units while patient is not eating.

## 2022-04-15 NOTE — PROGRESS NOTE ADULT - PROBLEM SELECTOR PLAN 1
On admission, WBC elevated at 231k, with 88% blasts. Patient was anemic with Hgb of 6.8 and thrombocytopenic with platelet count of 59k. Peripheral smear was reviewed at [facility], which found lymphocytes, blasts, no Antoni rods.    s/p leukopheresis on 4/13/22, and was started on hydrea 1000mg q8h.    Continue hydrea 1000mg q8h for now.    Peripheral Flow cytometry confirming CD33+, MPO+, CD34- AML. FLT3 and BCR-ABL pending   Pathologist Dr. Chua identified Antoni rods, thus ATRA (tretinoin) was started on 4/13 40mg q12h. Due to concern for possible differentiation syndrome given the patient's leukocytosis and suspected myeloid malignancy, started dexamethasone 10mg q12h on 4/13. FISH now confirming negative PML-LOREN on 4/14. Dexamethasone and ATRA discontinued 4/14/22   s/p BMBx on 4/13. Foundation sent   G6PD pending   Monitor CBC with diff, transfuse as needed.  Monitor electrolytes, replete as needed.  Strict I/o   TTE obtained 4/13 with EF of 65%; no gross ventricular abnormalities   Plan is to start Dacogen and Venetoclax, patient consented. Plan is for dacogen on 4/15. Will not start Venetoclax until WBC <25K   Continue IVF  Mouth care. On admission, WBC elevated at 231k, with 88% blasts. Patient was anemic with Hgb of 6.8 and thrombocytopenic with platelet count of 59k. Peripheral smear was reviewed at [facility], which found lymphocytes, blasts, no Antoni rods.    s/p leukopheresis on 4/13/22.  Peripheral Flow cytometry confirming CD33+, MPO+, CD34- AML. FLT3 and BCR-ABL pending   Pathologist Dr. Chua identified Antoni rods, thus ATRA (tretinoin) was started on 4/13 40mg q12h. Due to concern for possible differentiation syndrome given the patient's leukocytosis and suspected myeloid malignancy, started dexamethasone 10mg q12h on 4/13. FISH now confirming negative PML-LOREN on 4/14. Dexamethasone and ATRA discontinued 4/14/22   s/p BMBx on 4/13. Foundation sent   G6PD pending   Monitor CBC with diff, transfuse as needed.  4/15- Transfuse one unit platelets for plateelt 33 and transfuse half unit PRBC each over 3 hrs x2.  4/15- Lasix 40 mg in between transfusions.  Monitor electrolytes, replete as needed.  Strict I/o  TTE obtained 4/13 with EF of 65%; no gross ventricular abnormalities   4/15- Start Dacogen and Venetoclax today, start Venetoclax 20 mg on 4/15, 50 mg  on 4/16 and 4/17 - Venetoclax 100 mg.  Continue IVF, monitor tumor lysis labs BID  Mouth care.  4/15- PICC placement today.

## 2022-04-15 NOTE — CONSULT NOTE ADULT - PROBLEM SELECTOR RECOMMENDATION 4
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

## 2022-04-15 NOTE — PROGRESS NOTE ADULT - SUBJECTIVE AND OBJECTIVE BOX
Diagnosis:    Protocol/Chemo Regimen:    Day:     Pt endorsed:    Review of Systems:     Pain scale:     Diet:     Allergies    No Known Allergies    Intolerances        ANTIMICROBIALS      HEME/ONC MEDICATIONS  heparin   Injectable 6000 Unit(s) IV Push every 6 hours PRN  heparin   Injectable 3000 Unit(s) IV Push every 6 hours PRN  heparin  Infusion.  Unit(s)/Hr IV Continuous <Continuous>  hydroxyurea 1000 milliGRAM(s) Oral every 8 hours      STANDING MEDICATIONS  allopurinol 300 milliGRAM(s) Oral daily  benzonatate 100 milliGRAM(s) Oral every 8 hours  Biotene Dry Mouth Oral Rinse 15 milliLiter(s) Swish and Spit three times a day  dextrose 5%. 1000 milliLiter(s) IV Continuous <Continuous>  dextrose 5%. 1000 milliLiter(s) IV Continuous <Continuous>  dextrose 50% Injectable 25 Gram(s) IV Push once  dextrose 50% Injectable 12.5 Gram(s) IV Push once  dextrose 50% Injectable 25 Gram(s) IV Push once  glucagon  Injectable 1 milliGRAM(s) IntraMuscular once  insulin glargine Injectable (LANTUS) 15 Unit(s) SubCutaneous at bedtime  insulin lispro (ADMELOG) corrective regimen sliding scale   SubCutaneous three times a day before meals  insulin lispro (ADMELOG) corrective regimen sliding scale   SubCutaneous at bedtime  insulin lispro Injectable (ADMELOG) 5 Unit(s) SubCutaneous three times a day before meals  metoprolol tartrate 50 milliGRAM(s) Oral two times a day  pantoprazole    Tablet 40 milliGRAM(s) Oral before breakfast  sodium chloride 0.9%. 1000 milliLiter(s) IV Continuous <Continuous>  verapamil  milliGRAM(s) Oral daily      PRN MEDICATIONS  acetaminophen     Tablet .. 650 milliGRAM(s) Oral every 6 hours PRN  aluminum hydroxide/magnesium hydroxide/simethicone Suspension 30 milliLiter(s) Oral every 4 hours PRN  dextrose Oral Gel 15 Gram(s) Oral once PRN        Vital Signs Last 24 Hrs  T(C): 36.8 (15 Apr 2022 05:38), Max: 37.1 (15 Apr 2022 04:09)  T(F): 98.2 (15 Apr 2022 05:38), Max: 98.8 (15 Apr 2022 04:09)  HR: 67 (15 Apr 2022 05:38) (61 - 100)  BP: 147/87 (15 Apr 2022 05:38) (103/63 - 148/85)  BP(mean): --  RR: 18 (15 Apr 2022 05:38) (18 - 20)  SpO2: 100% (15 Apr 2022 05:38) (93% - 100%)    PHYSICAL EXAM  General: NAD  HEENT: PERRLA, EOMOI, clear oropharynx, anicteric sclera, pink conjunctiva  Neck: supple  CV: (+) S1/S2 RRR  Lungs: clear to auscultation, no wheezes or rales  Abdomen: soft, non-tender, non-distended (+) BS  Ext: no clubbing, cyanosis or edema  Skin: no rashes and no petechiae  Neuro: alert and oriented X 3, no focal deficits  Central Line:     RECENT CULTURES:  04-12 @ 18:28  .Blood Blood-Peripheral  --  --  --    No growth to date.  --        LABS:                        7.0    19.83 )-----------( 50       ( 15 Apr 2022 01:22 )             20.1         Mean Cell Volume : 91.4 fl  Mean Cell Hemoglobin : 31.8 pg  Mean Cell Hemoglobin Concentration : 34.8 gm/dL  Auto Neutrophil # : x  Auto Lymphocyte # : x  Auto Monocyte # : x  Auto Eosinophil # : x  Auto Basophil # : x  Auto Neutrophil % : x  Auto Lymphocyte % : x  Auto Monocyte % : x  Auto Eosinophil % : x  Auto Basophil % : x      04-14    138  |  98  |  27<H>  ----------------------------<  337<H>  3.4<L>   |  23  |  0.86    Ca    7.9<L>      14 Apr 2022 15:09  Phos  6.8     04-14  Mg     1.6     04-14    TPro  6.4  /  Alb  3.2<L>  /  TBili  1.3<H>  /  DBili  x   /  AST  45<H>  /  ALT  25  /  AlkPhos  65  04-14      Mg 1.6  Phos 6.8  Mg 8.90  Phos 7.3      PT/INR - ( 14 Apr 2022 15:09 )   PT: 23.0 sec;   INR: 1.99 ratio         PTT - ( 15 Apr 2022 01:22 )  PTT:103.0 sec    LDH 1478  Uric Acid 4.3        RADIOLOGY & ADDITIONAL STUDIES:         Diagnosis: AML    Protocol/Chemo Regimen: Dacogen/Venetocla (Venetocalx 20 mg on 4/15, 50 mg on 4/16 and continue 100 mg starting 4/17).    Day: 1    Pt endorsed: abdominal discomfort  +fatigue    Review of Systems: Denies any chest pain, palpitation, SOB abdominal pain.    Pain scale: Denies    Diet: Consistent carb     Allergies:  Known Allergies    HEME/ONC MEDICATIONS  heparin   Injectable 6000 Unit(s) IV Push every 6 hours PRN  heparin   Injectable 3000 Unit(s) IV Push every 6 hours PRN  heparin  Infusion.  Unit(s)/Hr IV Continuous <Continuous>  hydroxyurea 1000 milliGRAM(s) Oral every 8 hours    STANDING MEDICATIONS  allopurinol 300 milliGRAM(s) Oral daily  benzonatate 100 milliGRAM(s) Oral every 8 hours  Biotene Dry Mouth Oral Rinse 15 milliLiter(s) Swish and Spit three times a day  dextrose 5%. 1000 milliLiter(s) IV Continuous <Continuous>  dextrose 5%. 1000 milliLiter(s) IV Continuous <Continuous>  dextrose 50% Injectable 25 Gram(s) IV Push once  dextrose 50% Injectable 12.5 Gram(s) IV Push once  dextrose 50% Injectable 25 Gram(s) IV Push once  glucagon  Injectable 1 milliGRAM(s) IntraMuscular once  insulin glargine Injectable (LANTUS) 15 Unit(s) SubCutaneous at bedtime  insulin lispro (ADMELOG) corrective regimen sliding scale   SubCutaneous three times a day before meals  insulin lispro (ADMELOG) corrective regimen sliding scale   SubCutaneous at bedtime  insulin lispro Injectable (ADMELOG) 5 Unit(s) SubCutaneous three times a day before meals  metoprolol tartrate 50 milliGRAM(s) Oral two times a day  pantoprazole    Tablet 40 milliGRAM(s) Oral before breakfast  sodium chloride 0.9%. 1000 milliLiter(s) IV Continuous <Continuous>  verapamil  milliGRAM(s) Oral daily    PRN MEDICATIONS  acetaminophen     Tablet .. 650 milliGRAM(s) Oral every 6 hours PRN  aluminum hydroxide/magnesium hydroxide/simethicone Suspension 30 milliLiter(s) Oral every 4 hours PRN  dextrose Oral Gel 15 Gram(s) Oral once PRN    Vital Signs Last 24 Hrs  T(C): 36.8 (15 Apr 2022 05:38), Max: 37.1 (15 Apr 2022 04:09)  T(F): 98.2 (15 Apr 2022 05:38), Max: 98.8 (15 Apr 2022 04:09)  HR: 67 (15 Apr 2022 05:38) (61 - 100)  BP: 147/87 (15 Apr 2022 05:38) (103/63 - 148/85)  BP(mean): --  RR: 18 (15 Apr 2022 05:38) (18 - 20)  SpO2: 100% (15 Apr 2022 05:38) (93% - 100%)    PHYSICAL EXAM  General: NAD, resting in bed.  HEENT: PERRLA, EOMOI, clear oropharynx  Neck: supple  CV: (+) S1/S2 RRR  Lungs: Diminished B/L bases  Abdomen: soft, non-tender, non-distended (+) BS  Ext: no clubbing, cyanosis or edema  Skin: no rashes and no petechiae  Neuro: alert and oriented X 3, no focal deficits  Central Line: PIVL x3    RECENT CULTURES:  04-12 @ 18:28  .Blood Blood-Peripheral  No growth to date.    LABS:                        7.0    19.83 )-----------( 50       ( 15 Apr 2022 01:22 )             20.1     Mean Cell Volume : 91.4 fl  Mean Cell Hemoglobin : 31.8 pg  Mean Cell Hemoglobin Concentration : 34.8 gm/dL  Auto Neutrophil # : x  Auto Lymphocyte # : x  Auto Monocyte # : x  Auto Eosinophil # : x  Auto Basophil # : x  Auto Neutrophil % : x  Auto Lymphocyte % : x  Auto Monocyte % : x  Auto Eosinophil % : x  Auto Basophil % : x    04-14    138  |  98  |  27<H>  ----------------------------<  337<H>  3.4<L>   |  23  |  0.86    Ca    7.9<L>      14 Apr 2022 15:09  Phos  6.8     04-14  Mg     1.6     04-14    TPro  6.4  /  Alb  3.2<L>  /  TBili  1.3<H>  /  DBili  x   /  AST  45<H>  /  ALT  25  /  AlkPhos  65  04-14    Mg 1.6  Phos 6.8  Mg 8.90  Phos 7.3    PT/INR - ( 14 Apr 2022 15:09 )   PT: 23.0 sec;   INR: 1.99 ratio       PTT - ( 15 Apr 2022 01:22 )  PTT:103.0 sec  LDH 1478  Uric Acid 4.3    RADIOLOGY & ADDITIONAL STUDIES:   Xray Abdomen 1 View PORTABLE -Urgent (Xray Abdomen 1 View PORTABLE -Urgent .) (04.15.22 @ 05:33) >  No evidence of bowel obstruction.

## 2022-04-15 NOTE — CONSULT NOTE ADULT - PROBLEM SELECTOR RECOMMENDATION 6
Actions:  [x] Rapport building     [x] Symptom assessment    [x] Eliciting preferences of goals   [] Prognostic understanding    [] Emotional Support  [] Coping skill development  []  Other  Interdisciplinary Referrals: Chaplaincy and SW for HCP assignment  Communication: d/w primary team  Documentation Review: [x] Primary Team [] Consultants [] Interdisciplinary team  Content: n/a

## 2022-04-16 NOTE — PROGRESS NOTE ADULT - PROBLEM SELECTOR PLAN 1
On admission, WBC elevated at 231k, with 88% blasts. Patient was anemic with Hgb of 6.8 and thrombocytopenic with platelet count of 59k. Peripheral smear was reviewed at [facility], which found lymphocytes, blasts, no Antoni rods.    s/p leukopheresis on 4/13/22.  Peripheral Flow cytometry confirming CD33+, MPO+, CD34- AML. FLT3 and BCR-ABL pending   Pathologist Dr. Chua identified Antoni rods, thus ATRA (tretinoin) was started on 4/13 40mg q12h. Due to concern for possible differentiation syndrome given the patient's leukocytosis and suspected myeloid malignancy, started dexamethasone 10mg q12h on 4/13. FISH now confirming negative PML-LOREN on 4/14. Dexamethasone and ATRA discontinued 4/14/22   s/p BMBx on 4/13. Foundation sent   G6PD pending   Monitor CBC with diff, transfuse as needed.  4/15- Transfuse one unit platelets for plateelt 33 and transfuse half unit PRBC each over 3 hrs x2.  4/15- Lasix 40 mg in between transfusions.  Monitor electrolytes, replete as needed.  Strict I/o  TTE obtained 4/13 with EF of 65%; no gross ventricular abnormalities   4/15- Start Dacogen and Venetoclax today, start Venetoclax 20 mg on 4/15, 50 mg  on 4/16 and 4/17 - Venetoclax 100 mg.  Continue IVF, monitor tumor lysis labs BID  Mouth care.  4/15- PICC placement today. 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   Peripheral Flow cytometry confirming CD33+, MPO+, CD34- AML. FLT3 and BCR-ABL pending   Pathologist Dr. Chua identified Antoni rods, thus ATRA (tretinoin) was started on 4/13 40mg q12h. Due to concern for possible differentiation syndrome given the patient's leukocytosis and suspected myeloid malignancy, started dexamethasone 10mg q12h on 4/13. FISH now confirming negative PML-LOREN on 4/14. Dexamethasone and ATRA discontinued 4/14/22   s/p BMBx on 4/13. Foundation sent   G6PD pending   Monitor CBC with diff, transfuse as needed.  4/15- Transfuse one unit platelets for plateelt 33 and transfuse half unit PRBC each over 3 hrs x2.  4/15- Lasix 40 mg in between transfusions.  Monitor electrolytes, replete as needed.  Strict I/o  TTE obtained 4/13 with EF of 65%; no gross ventricular abnormalities   4/15- Start Dacogen and Venetoclax today, start Venetoclax 20 mg on 4/15, 50 mg  on 4/16 and 4/17 - Venetoclax 100 mg.  Continue IVF, monitor tumor lysis labs BID  Mouth care.  4/15- PICC placement today. 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   Peripheral Flow cytometry confirming CD33+, MPO+, CD34- AML. FLT3 and BCR-ABL pending   Pathologist Dr. Chua identified Antoni rods, thus ATRA (tretinoin) was started on 4/13 40mg q12h. Due to concern for possible differentiation syndrome given the patient's leukocytosis and suspected myeloid malignancy, started dexamethasone 10mg q12h on 4/13. FISH now confirming negative PML-LOREN on 4/14. Dexamethasone and ATRA discontinued 4/14/22   s/p BMBx on 4/13. Foundation sent   G6PD pending   Monitor CBC with diff, transfuse as needed.  4/16 -  transfuse half unit PRBC each over 3 hrs x2  , Lasix 40 mg IV in between units.  4/15- Lasix 40 mg in between transfusions.  Monitor electrolytes, replete as needed.  Strict I/o  TTE obtained 4/13 with EF of 65%; no gross ventricular abnormalities   4/15- Start Dacogen and Venetoclax today, start Venetoclax 20 mg on 4/15, 50 mg  on 4/16 and 4/17 - Venetoclax 100 mg.  Continue IVF, monitor tumor lysis labs BID  Mouth care.  4/15- PICC placement today. 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   Peripheral Flow cytometry confirming CD33+, MPO+, CD34- AML. FLT3 and BCR-ABL pending   Pathologist Dr. Chua identified Antoni rods, thus ATRA (tretinoin) was started on 4/13 40mg q12h. Due to concern for possible differentiation syndrome given the patient's leukocytosis and suspected myeloid malignancy, started dexamethasone 10mg q12h on 4/13. FISH now confirming negative PML-LOREN on 4/14. Dexamethasone and ATRA discontinued 4/14/22   s/p BM Bx on 4/13. Foundation sent   G6PD pending   Monitor CBC with diff, transfuse as needed.  4/16 -  transfuse half unit PRBC each over 3 hrs x2  , Lasix 40 mg IV in between units.  Monitor electrolytes, replete as needed.  Strict I/o  TTE obtained 4/13 with EF of 65%; no gross ventricular abnormalities   4/15- Start Dacogen and Venetoclax today, start Venetoclax 20 mg on 4/15, 50 mg  on 4/16 and 4/17 - Venetoclax 100 mg.  Continue IVF, monitor tumor lysis labs BID  Mouth care.  4/15- PICC placement today.  4/16- D/David Venetoclax due to worsening fatigue.

## 2022-04-16 NOTE — PROGRESS NOTE ADULT - NSPROGADDITIONALINFOA_GEN_ALL_CORE
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

## 2022-04-16 NOTE — PROGRESS NOTE ADULT - PROBLEM SELECTOR PLAN 2
CTA chest showing acute subsegmental pulmonary artery embolus in the right upper lobe.   On heparin gtt for AC, until platelet counts 40 K  4/15- platelet -33, transfuse one unit platelets, and f/u platelet count , if platelet count >40 resume Heparin full A/C CTA chest showing acute subsegmental pulmonary artery embolus in the right upper lobe.   On heparin gtt for AC, until platelet counts 40 K  4/16- f/u platelet count  Q 6 hrs , if platelet count >40 resume Heparin full A/C  Monitor APTT and platelets Q6 hrs

## 2022-04-16 NOTE — PROGRESS NOTE ADULT - ASSESSMENT
This is an 89F w/ T2DM, HTN who initially presented to the [facility] ED for palpitations x 3 days and weakness x 1 day. In [facility] ED, pt in a-fib w/ RVR HR 160s, saturating well on RA, BP hypertensive, RR 14. Labs significant for + with 88% blasts concerning for acute leukemia. Heme consulted in ED, patient transferred to MICU for urgent leukaphereses, s/p  CHIOMA bro and leukapheresis with improvement in counts, flow cytometry showed AML, bone marrow bx done on 4/13 awaiting result. 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

## 2022-04-16 NOTE — PROGRESS NOTE ADULT - SUBJECTIVE AND OBJECTIVE BOX
Diagnosis:    Protocol/Chemo Regimen:    Day:     Pt endorsed:    Review of Systems:     Pain scale:     Diet:     Allergies    No Known Allergies    Intolerances        ANTIMICROBIALS  levoFLOXacin  Tablet 250 milliGRAM(s) Oral every 24 hours  posaconazole DR Tablet 300 milliGRAM(s) Oral daily  posaconazole DR Tablet 300 milliGRAM(s) Oral daily      HEME/ONC MEDICATIONS  decitabine IVPB (eMAR) 35 milliGRAM(s) IV Intermittent every 24 hours  heparin   Injectable 6500 Unit(s) IV Push every 6 hours PRN  heparin   Injectable 3000 Unit(s) IV Push every 6 hours PRN  heparin  Infusion.  Unit(s)/Hr IV Continuous <Continuous>      STANDING MEDICATIONS  allopurinol 300 milliGRAM(s) Oral daily  benzonatate 100 milliGRAM(s) Oral every 8 hours  Biotene Dry Mouth Oral Rinse 15 milliLiter(s) Swish and Spit three times a day  dextrose 5%. 1000 milliLiter(s) IV Continuous <Continuous>  dextrose 5%. 1000 milliLiter(s) IV Continuous <Continuous>  dextrose 50% Injectable 25 Gram(s) IV Push once  dextrose 50% Injectable 12.5 Gram(s) IV Push once  dextrose 50% Injectable 25 Gram(s) IV Push once  famotidine    Tablet 20 milliGRAM(s) Oral daily  glucagon  Injectable 1 milliGRAM(s) IntraMuscular once  insulin glargine Injectable (LANTUS) 15 Unit(s) SubCutaneous at bedtime  insulin lispro (ADMELOG) corrective regimen sliding scale   SubCutaneous three times a day before meals  insulin lispro (ADMELOG) corrective regimen sliding scale   SubCutaneous at bedtime  insulin lispro Injectable (ADMELOG) 5 Unit(s) SubCutaneous three times a day before meals  metoprolol tartrate 50 milliGRAM(s) Oral two times a day  ondansetron Injectable 8 milliGRAM(s) IV Push every 24 hours  sodium chloride 0.9%. 1000 milliLiter(s) IV Continuous <Continuous>  verapamil  milliGRAM(s) Oral daily      PRN MEDICATIONS  acetaminophen     Tablet .. 650 milliGRAM(s) Oral every 6 hours PRN  aluminum hydroxide/magnesium hydroxide/simethicone Suspension 30 milliLiter(s) Oral every 4 hours PRN  dextrose Oral Gel 15 Gram(s) Oral once PRN        Vital Signs Last 24 Hrs  T(C): 36.9 (16 Apr 2022 05:07), Max: 37.2 (15 Apr 2022 09:45)  T(F): 98.5 (16 Apr 2022 05:07), Max: 99 (15 Apr 2022 09:45)  HR: 74 (16 Apr 2022 05:07) (62 - 95)  BP: 129/74 (16 Apr 2022 05:07) (94/51 - 138/66)  BP(mean): --  RR: 18 (16 Apr 2022 05:07) (16 - 18)  SpO2: 100% (16 Apr 2022 05:07) (98% - 100%)    PHYSICAL EXAM  General: NAD  HEENT: PERRLA, EOMOI, clear oropharynx, anicteric sclera, pink conjunctiva  Neck: supple  CV: (+) S1/S2 RRR  Lungs: clear to auscultation, no wheezes or rales  Abdomen: soft, non-tender, non-distended (+) BS  Ext: no clubbing, cyanosis or edema  Skin: no rashes and no petechiae  Neuro: alert and oriented X 3, no focal deficits  Central Line:     RECENT CULTURES:  04-12 @ 18:28  .Blood Blood-Peripheral  --  --  --    No growth to date.  --        LABS:                        7.2    x     )-----------( x        ( 16 Apr 2022 02:12 )             21.4         Mean Cell Volume : 91.1 fl  Mean Cell Hemoglobin : 30.2 pg  Mean Cell Hemoglobin Concentration : 33.2 gm/dL  Auto Neutrophil # : x  Auto Lymphocyte # : x  Auto Monocyte # : x  Auto Eosinophil # : x  Auto Basophil # : x  Auto Neutrophil % : x  Auto Lymphocyte % : x  Auto Monocyte % : x  Auto Eosinophil % : x  Auto Basophil % : x      04-16    139  |  102  |  30<H>  ----------------------------<  163<H>  4.8   |  24  |  0.80    Ca    7.4<L>      16 Apr 2022 01:28  Phos  5.3     04-16  Mg     1.8     04-16    TPro  5.9<L>  /  Alb  3.1<L>  /  TBili  0.8  /  DBili  x   /  AST  32  /  ALT  26  /  AlkPhos  50  04-16      Mg 1.8  Phos 5.3  Mg 1.7  Phos 4.6      PT/INR - ( 16 Apr 2022 01:28 )   PT: 18.8 sec;   INR: 1.61 ratio         PTT - ( 16 Apr 2022 01:28 )  PTT:22.6 sec    LDH 1100  Uric Acid 3.1    LDH 1480  Uric Acid 3.4        RADIOLOGY & ADDITIONAL STUDIES:         Diagnosis: AML    Protocol/Chemo Regimen: Dacogen/Venetocla (Venetocalx 20 mg on 4/15, 50 mg on 4/16 and continue 100 mg starting 4/17).    Day: 2    Pt endorsed: abdominal discomfort  +fatigue    Review of Systems: Denies any chest pain, palpitation, SOB abdominal pain.    Pain scale: Denies    Diet: Consistent carb     Allergies:  Known Allergies    ANTIMICROBIALS  levoFLOXacin  Tablet 250 milliGRAM(s) Oral every 24 hours  posaconazole DR Tablet 300 milliGRAM(s) Oral daily  posaconazole DR Tablet 300 milliGRAM(s) Oral daily    HEME/ONC MEDICATIONS  decitabine IVPB (eMAR) 35 milliGRAM(s) IV Intermittent every 24 hours  heparin   Injectable 6500 Unit(s) IV Push every 6 hours PRN  heparin   Injectable 3000 Unit(s) IV Push every 6 hours PRN  heparin  Infusion.  Unit(s)/Hr IV Continuous <Continuous>    STANDING MEDICATIONS  allopurinol 300 milliGRAM(s) Oral daily  benzonatate 100 milliGRAM(s) Oral every 8 hours  Biotene Dry Mouth Oral Rinse 15 milliLiter(s) Swish and Spit three times a day  dextrose 5%. 1000 milliLiter(s) IV Continuous <Continuous>  dextrose 5%. 1000 milliLiter(s) IV Continuous <Continuous>  dextrose 50% Injectable 25 Gram(s) IV Push once  dextrose 50% Injectable 12.5 Gram(s) IV Push once  dextrose 50% Injectable 25 Gram(s) IV Push once  famotidine    Tablet 20 milliGRAM(s) Oral daily  glucagon  Injectable 1 milliGRAM(s) IntraMuscular once  insulin glargine Injectable (LANTUS) 15 Unit(s) SubCutaneous at bedtime  insulin lispro (ADMELOG) corrective regimen sliding scale   SubCutaneous three times a day before meals  insulin lispro (ADMELOG) corrective regimen sliding scale   SubCutaneous at bedtime  insulin lispro Injectable (ADMELOG) 5 Unit(s) SubCutaneous three times a day before meals  metoprolol tartrate 50 milliGRAM(s) Oral two times a day  ondansetron Injectable 8 milliGRAM(s) IV Push every 24 hours  sodium chloride 0.9%. 1000 milliLiter(s) IV Continuous <Continuous>  verapamil  milliGRAM(s) Oral daily    PRN MEDICATIONS  acetaminophen     Tablet .. 650 milliGRAM(s) Oral every 6 hours PRN  aluminum hydroxide/magnesium hydroxide/simethicone Suspension 30 milliLiter(s) Oral every 4 hours PRN  dextrose Oral Gel 15 Gram(s) Oral once PRN    Vital Signs Last 24 Hrs  T(C): 36.9 (16 Apr 2022 05:07), Max: 37.2 (15 Apr 2022 09:45)  T(F): 98.5 (16 Apr 2022 05:07), Max: 99 (15 Apr 2022 09:45)  HR: 74 (16 Apr 2022 05:07) (62 - 95)  BP: 129/74 (16 Apr 2022 05:07) (94/51 - 138/66)  BP(mean): --  RR: 18 (16 Apr 2022 05:07) (16 - 18)  SpO2: 100% (16 Apr 2022 05:07) (98% - 100%)    PHYSICAL EXAM  General: NAD, resting in bed.  HEENT: PERRLA, EOMOI, clear oropharynx  Neck: supple  CV: (+) S1/S2 RRR  Lungs: Diminished B/L bases  Abdomen: soft, non-tender, non-distended (+) BS  Ext: no clubbing, cyanosis or edema  Skin: no rashes and no petechiae  Neuro: alert and oriented X 3, no focal deficits  Central Line: PIVL x3    RECENT CULTURES:  04-12 @ 18:28  .Blood Blood-Peripheral  No growth to date.    LABS:                        7.2    x     )-----------( x        ( 16 Apr 2022 02:12 )             21.4     Mean Cell Volume : 91.1 fl  Mean Cell Hemoglobin : 30.2 pg  Mean Cell Hemoglobin Concentration : 33.2 gm/dL  Auto Neutrophil # : x  Auto Lymphocyte # : x  Auto Monocyte # : x  Auto Eosinophil # : x  Auto Basophil # : x  Auto Neutrophil % : x  Auto Lymphocyte % : x  Auto Monocyte % : x  Auto Eosinophil % : x  Auto Basophil % : x    04-16    139  |  102  |  30<H>  ----------------------------<  163<H>  4.8   |  24  |  0.80    Ca    7.4<L>      16 Apr 2022 01:28  Phos  5.3     04-16  Mg     1.8     04-16    TPro  5.9<L>  /  Alb  3.1<L>  /  TBili  0.8  /  DBili  x   /  AST  32  /  ALT  26  /  AlkPhos  50  04-16  Mg 1.8  Phos 5.3  Mg 1.7  Phos 4.6    PT/INR - ( 16 Apr 2022 01:28 )   PT: 18.8 sec;   INR: 1.61 ratio      PTT - ( 16 Apr 2022 01:28 )  PTT:22.6 sec  LDH 1100  Uric Acid 3.1  LDH 1480  Uric Acid 3.4    RADIOLOGY & ADDITIONAL STUDIES:  Xray Abdomen 1 View PORTABLE -Urgent (Xray Abdomen 1 View PORTABLE -Urgent .) (04.15.22 @ 05:33) >  No evidence of bowel obstruction.         Diagnosis: AML    Protocol/Chemo Regimen: Dacogen/Venetocla (Venetocalx 20 mg on 4/15, 50 mg on 4/16 and continue 100 mg starting 4/17).    Day: 2    Pt endorsed: abdominal discomfort  +fatigue  +CABRAL    Review of Systems: Denies any chest pain, palpitation, SOB abdominal pain.    Pain scale: Denies    Diet: Consistent carb     Allergies:  Known Allergies    ANTIMICROBIALS  levoFLOXacin  Tablet 250 milliGRAM(s) Oral every 24 hours  posaconazole DR Tablet 300 milliGRAM(s) Oral daily  posaconazole DR Tablet 300 milliGRAM(s) Oral daily    HEME/ONC MEDICATIONS  decitabine IVPB (eMAR) 35 milliGRAM(s) IV Intermittent every 24 hours  heparin   Injectable 6500 Unit(s) IV Push every 6 hours PRN  heparin   Injectable 3000 Unit(s) IV Push every 6 hours PRN  heparin  Infusion.  Unit(s)/Hr IV Continuous <Continuous>    STANDING MEDICATIONS  allopurinol 300 milliGRAM(s) Oral daily  benzonatate 100 milliGRAM(s) Oral every 8 hours  Biotene Dry Mouth Oral Rinse 15 milliLiter(s) Swish and Spit three times a day  dextrose 5%. 1000 milliLiter(s) IV Continuous <Continuous>  dextrose 5%. 1000 milliLiter(s) IV Continuous <Continuous>  dextrose 50% Injectable 25 Gram(s) IV Push once  dextrose 50% Injectable 12.5 Gram(s) IV Push once  dextrose 50% Injectable 25 Gram(s) IV Push once  famotidine    Tablet 20 milliGRAM(s) Oral daily  glucagon  Injectable 1 milliGRAM(s) IntraMuscular once  insulin glargine Injectable (LANTUS) 15 Unit(s) SubCutaneous at bedtime  insulin lispro (ADMELOG) corrective regimen sliding scale   SubCutaneous three times a day before meals  insulin lispro (ADMELOG) corrective regimen sliding scale   SubCutaneous at bedtime  insulin lispro Injectable (ADMELOG) 5 Unit(s) SubCutaneous three times a day before meals  metoprolol tartrate 50 milliGRAM(s) Oral two times a day  ondansetron Injectable 8 milliGRAM(s) IV Push every 24 hours  sodium chloride 0.9%. 1000 milliLiter(s) IV Continuous <Continuous>  verapamil  milliGRAM(s) Oral daily    PRN MEDICATIONS  acetaminophen     Tablet .. 650 milliGRAM(s) Oral every 6 hours PRN  aluminum hydroxide/magnesium hydroxide/simethicone Suspension 30 milliLiter(s) Oral every 4 hours PRN  dextrose Oral Gel 15 Gram(s) Oral once PRN    Vital Signs Last 24 Hrs  T(C): 36.9 (16 Apr 2022 05:07), Max: 37.2 (15 Apr 2022 09:45)  T(F): 98.5 (16 Apr 2022 05:07), Max: 99 (15 Apr 2022 09:45)  HR: 74 (16 Apr 2022 05:07) (62 - 95)  BP: 129/74 (16 Apr 2022 05:07) (94/51 - 138/66)  BP(mean): --  RR: 18 (16 Apr 2022 05:07) (16 - 18)  SpO2: 100% (16 Apr 2022 05:07) (98% - 100%)    PHYSICAL EXAM  General: NAD, resting in bed.  HEENT: PERRLA, EOMOI, clear oropharynx  Neck: supple  CV: (+) S1/S2 RRR  Lungs: Diminished B/L bases  Abdomen: soft, non-tender, non-distended (+) BS  Ext: no clubbing, cyanosis or edema  Skin: no rashes and no petechiae  Neuro: alert and oriented X 3, no focal deficits  Central Line: PIVL x3    RECENT CULTURES:  04-12 @ 18:28  .Blood Blood-Peripheral  No growth to date.    LABS:                        7.2    x     )-----------( x        ( 16 Apr 2022 02:12 )             21.4     Mean Cell Volume : 91.1 fl  Mean Cell Hemoglobin : 30.2 pg  Mean Cell Hemoglobin Concentration : 33.2 gm/dL  Auto Neutrophil # : x  Auto Lymphocyte # : x  Auto Monocyte # : x  Auto Eosinophil # : x  Auto Basophil # : x  Auto Neutrophil % : x  Auto Lymphocyte % : x  Auto Monocyte % : x  Auto Eosinophil % : x  Auto Basophil % : x    04-16    139  |  102  |  30<H>  ----------------------------<  163<H>  4.8   |  24  |  0.80    Ca    7.4<L>      16 Apr 2022 01:28  Phos  5.3     04-16  Mg     1.8     04-16    TPro  5.9<L>  /  Alb  3.1<L>  /  TBili  0.8  /  DBili  x   /  AST  32  /  ALT  26  /  AlkPhos  50  04-16  Mg 1.8  Phos 5.3  Mg 1.7  Phos 4.6    PT/INR - ( 16 Apr 2022 01:28 )   PT: 18.8 sec;   INR: 1.61 ratio      PTT - ( 16 Apr 2022 01:28 )  PTT:22.6 sec  LDH 1100  Uric Acid 3.1  LDH 1480  Uric Acid 3.4    RADIOLOGY & ADDITIONAL STUDIES:  Xray Abdomen 1 View PORTABLE -Urgent (Xray Abdomen 1 View PORTABLE -Urgent .) (04.15.22 @ 05:33) >  No evidence of bowel obstruction.    Xray Chest 1 View- PORTABLE-Urgent (Xray Chest 1 View- PORTABLE-Urgent .) (04.15.22 @ 19:31) >   Left PICC to SVC. Pulmonary infiltrates.

## 2022-04-16 NOTE — PROGRESS NOTE ADULT - NS ATTEND AMEND GEN_ALL_CORE FT
89 yo woman admitted with palpitations, weakness, hyperleukocytosis.  WBC 230K.  WBC down post apheresis, HU.  CT +PE, pleural effusions, on civ heparin  Abd pain 4/14, now resolved.  X ray NSGP.  Neutropenic  HU stopped  started dac/lg  day 1 today (started lg 4/14)  PICC line  cont heparin, keep PTT around 60  tx plts to keep over 40 so that heparin  can be maintained for at least two wks  ppx abs with levaquin, posa  dose adjusted lg  TLS labs, I and O, diurese as needed  OOB 89 yo female with MHx sig for T2DM, HTN  here with newly diagnosed AML. She initially presented to PMD with severe weakness, unable to walk, WBC count > 200k. 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 Initial presentation complicated by tumor lysis syndrome, now s/p leukapheresis, rasburicase.  [facility] sent -- PENDING.  Plan for Decitabine + venetoclax (dose adjusted for posa)  Day 2    - CT chest w contrast 4/12/22: Acute subsegmental pulmonary artery embolus in the right upper lobe. ?right heart strain, patchy bilateral upper lobe opacities may be infectious or inflammatory.  - VTE / PE: Likely exacerbated by severely elevated WBC, now on heparin drip, monitor coags / PTT closely,cont heparin, keep PTT around 60  tx plts to keep over 40 so that heparin can be maintained for at least two wks  - Infectious workup: No current infectious sx, while at [facility] on 4/12/22 Blood Cx, RVP, UA were negative  - TTE with normal LVEF, ? new Afib with episodes of RVR, now on verapamil, follow-up oral DOAC when plts consistently > 50k with treatment of AML  - TLS: Would increase IVF to 75 cc/hr given TLS, s/p rasburicase on 4/12/22  - Shiley for leukapheresis can be removed tomorrow if WBC continue to trend in downward direction  - Continue hydrea and monitor cbc with diff, LDH, uric acid twice daily  - Thrombocytopenia: Transfuse to keep Plt >10k or > 15k if febrile or > 50k if bleeding  - Anemia: Transfuse to maintain Hb > 7.0   - Neutropenic ppx: when ANC < 1000 start levaquin/ posa  - Micaela-anal wound stage 2 (developed at home), wound care  - Maintain good oral hygiene, OOB to chair  - PT eval.

## 2022-04-17 NOTE — PROGRESS NOTE ADULT - ASSESSMENT
This is an 89F w/ T2DM, HTN who initially presented to the [facility] ED for palpitations x 3 days and weakness x 1 day. In [facility] ED, pt in a-fib w/ RVR HR 160s, saturating well on RA, BP hypertensive, RR 14. Labs significant for + with 88% blasts concerning for acute leukemia. Heme consulted in ED, patient transferred to MICU for urgent leukaphereses, s/p  CHIOMA bro and leukapheresis with improvement in counts, flow cytometry showed AML, bone marrow bx done on 4/13 awaiting result. 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       This is an 89F w/ T2DM, HTN who initially presented to the [facility] ED for palpitations x 3 days and weakness x 1 day. In [facility] ED, pt in a-fib w/ RVR HR 160s, saturating well on RA, BP hypertensive, RR 14. Labs significant for + with 88% blasts concerning for acute leukemia. 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

## 2022-04-17 NOTE — PROGRESS NOTE ADULT - NS ATTEND AMEND GEN_ALL_CORE FT
89 yo female with MHx sig for T2DM, HTN  here with newly diagnosed AML. She initially presented to PMD with severe weakness, unable to walk, WBC count > 200k. 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 Initial presentation complicated by tumor lysis syndrome, now s/p leukapheresis, rasburicase.  [facility] sent -- PENDING.  Plan for Decitabine + venetoclax (dose adjusted for posa)  Day 2    - CT chest w contrast 4/12/22: Acute subsegmental pulmonary artery embolus in the right upper lobe. ?right heart strain, patchy bilateral upper lobe opacities may be infectious or inflammatory.  - VTE / PE: Likely exacerbated by severely elevated WBC, now on heparin drip, monitor coags / PTT closely,cont heparin, keep PTT around 60  tx plts to keep over 40 so that heparin can be maintained for at least two wks  - Infectious workup: No current infectious sx, while at [facility] on 4/12/22 Blood Cx, RVP, UA were negative  - TTE with normal LVEF, ? new Afib with episodes of RVR, now on verapamil, follow-up oral DOAC when plts consistently > 50k with treatment of AML  - TLS: Would increase IVF to 75 cc/hr given TLS, s/p rasburicase on 4/12/22  - Shiley for leukapheresis can be removed tomorrow if WBC continue to trend in downward direction  - Continue hydrea and monitor cbc with diff, LDH, uric acid twice daily  - Thrombocytopenia: Transfuse to keep Plt >10k or > 15k if febrile or > 50k if bleeding  - Anemia: Transfuse to maintain Hb > 7.0   - Neutropenic ppx: when ANC < 1000 start levaquin/ posa  - Micaela-anal wound stage 2 (developed at home), wound care  - Maintain good oral hygiene, OOB to chair  - PT eval. 88 yo female with MHx sig for T2DM, HTN  here with newly diagnosed AML. She initially presented to PMD with severe weakness, unable to walk, WBC count > 200k. 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 Initial presentation complicated by tumor lysis syndrome, now s/p leukapheresis, rasburicase.  [facility] sent -- PENDING.  Plan for Decitabine + venetoclax (dose adjusted for posa), held venetoclax since 4/16  Day 3    - imrpoved mental status today, monitoring fluid status  - CT chest w contrast 4/12/22: Acute subsegmental pulmonary artery embolus in the right upper lobe. ?right heart strain, patchy bilateral upper lobe opacities may be infectious or inflammatory.  - VTE / PE: Likely exacerbated by severely elevated WBC, now on heparin drip, monitor coags / PTT closely,cont heparin, keep PTT around 60  tx plts to keep over 40 so that heparin can be maintained for at least two wks  - Infectious workup: No current infectious sx, while at [facility] on 4/12/22 Blood Cx, RVP, UA were negative  - TTE with normal LVEF, ? new Afib with episodes of RVR, now on verapamil, follow-up oral DOAC when plts consistently > 50k with treatment of AML  - TLS: Would increase IVF to 75 cc/hr given TLS, s/p rasburicase on 4/12/22  - Shiley for leukapheresis can be removed tomorrow if WBC continue to trend in downward direction  - Continue hydrea and monitor cbc with diff, LDH, uric acid twice daily  - Thrombocytopenia: Transfuse to keep Plt >10k or > 15k if febrile or > 50k if bleeding  - Anemia: Transfuse to maintain Hb > 7.0   - Neutropenic ppx: when ANC < 1000 start levaquin/ posa  - Micaela-anal wound stage 2 (developed at home), wound care  - Maintain good oral hygiene, OOB to chair  - PT eval.

## 2022-04-17 NOTE — PROGRESS NOTE ADULT - PROBLEM SELECTOR PLAN 2
CTA chest showing acute subsegmental pulmonary artery embolus in the right upper lobe.   On heparin gtt for AC, until platelet counts 40 K  4/16- f/u platelet count  Q 6 hrs , if platelet count >40 resume Heparin full A/C  Monitor APTT and platelets Q6 hrs CTA chest showing acute subsegmental pulmonary artery embolus in the right upper lobe.   On heparin gtt for AC.  4/16- f/u APTT and  platelet count  Q 6 hrs, if platelet count less than 40K transfuse platelets, keep once >40 K  resume Heparin full A/C, following full AC nomogram.

## 2022-04-17 NOTE — PROVIDER CONTACT NOTE (OTHER) - SITUATION
aPTT resulted 54.6. Goal is 58-99. Based on nanogram, dose to be increased by 200 and bolus to be given.

## 2022-04-17 NOTE — PROVIDER CONTACT NOTE (CRITICAL VALUE NOTIFICATION) - ASSESSMENT
No signs or symptoms of bleeding
No signs/symptoms of bleeding
Patient receiving heparin through right arrow and blood taken from left PICC.
WNL, no signs of bleeding noted
Pt has no c/o chest pain, all VSS, pt currently resting comfortably on 6L humidified n/c, no active signs of bleeding noted
WNL

## 2022-04-17 NOTE — PROGRESS NOTE ADULT - PROBLEM SELECTOR PLAN 1
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   Peripheral Flow cytometry confirming CD33+, MPO+, CD34- AML. FLT3 and BCR-ABL pending   Pathologist Dr. Cuha identified Antoni rods, thus ATRA (tretinoin) was started on 4/13 40mg q12h. Due to concern for possible differentiation syndrome given the patient's leukocytosis and suspected myeloid malignancy, started dexamethasone 10mg q12h on 4/13. FISH now confirming negative PML-LOREN on 4/14. Dexamethasone and ATRA discontinued 4/14/22   s/p BM Bx on 4/13. [facility] sent   G6PD pending   Monitor CBC with diff, transfuse as needed.  4/16 -  transfuse half unit PRBC each over 3 hrs x2  , Lasix 40 mg IV in between units.  Monitor electrolytes, replete as needed.  Strict I/o  TTE obtained 4/13 with EF of 65%; no gross ventricular abnormalities   4/15- Start Dacogen and Venetoclax today, start Venetoclax 20 mg on 4/15, 50 mg  on 4/16 and 4/17 - Venetoclax 100 mg.  Continue IVF, monitor tumor lysis labs BID  Mouth care.  4/15- PICC placement today.  4/16- D/David Venetoclax due to worsening fatigue. 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   Peripheral Flow cytometry confirming CD33+, MPO+, CD34- AML. FLT3 and BCR-ABL pending   Pathologist Dr. Chua identified Antoni rods, thus ATRA (tretinoin) was started on 4/13 40mg q12h. Due to concern for possible differentiation syndrome given the patient's leukocytosis and suspected myeloid malignancy, started dexamethasone 10mg q12h on 4/13. FISH now confirming negative PML-LOREN on 4/14. 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 [facility] sent   G6PD pending   Monitor CBC with diff, transfuse as needed.  Monitor electrolytes, replete as needed.  Strict I/o  TTE obtained 4/13 with EF of 65%; no gross ventricular abnormalities   4/15- Start Dacogen and Venetoclax today, start Venetoclax 20 mg on 4/15, 50 mg  on 4/16 and 4/17 - Venetoclax 100 mg.  Continue IVF, monitor tumor lysis labs BID  Mouth care.  4/16- D/David Venetoclax due to worsening fatigue.  4/17- lasix 40 mg x1 dose today

## 2022-04-18 NOTE — PROGRESS NOTE ADULT - PROBLEM SELECTOR PLAN 1
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   Peripheral Flow cytometry confirming CD33+, MPO+, CD34- AML. FLT3 and BCR-ABL pending   Pathologist Dr. Chua identified Antoni rods, thus ATRA (tretinoin) was started on 4/13 40mg q12h. Due to concern for possible differentiation syndrome given the patient's leukocytosis and suspected myeloid malignancy, started dexamethasone 10mg q12h on 4/13. FISH now confirming negative PML-LOREN on 4/14. 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 [facility] sent   G6PD pending   Monitor CBC with diff, transfuse as needed.  Monitor electrolytes, replete as needed.  Strict I/o  TTE obtained 4/13 with EF of 65%; no gross ventricular abnormalities   4/15- Start Dacogen and Venetoclax today, start Venetoclax 20 mg on 4/15, 50 mg  on 4/16 and 4/17 - Venetoclax 100 mg.  Continue IVF, monitor tumor lysis labs BID  Mouth care.  4/16- D/David Venetoclax due to worsening fatigue.  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   Peripheral Flow cytometry confirming CD33+, MPO+, CD34- AML. FLT3 and BCR-ABL pending   Pathologist Dr. Chua identified Antoni rods, thus ATRA (tretinoin) was started on 4/13 40mg q12h. Due to concern for possible differentiation syndrome given the patient's leukocytosis and suspected myeloid malignancy, started dexamethasone 10mg q12h on 4/13. FISH now confirming negative PML-LOREN on 4/14. 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 [facility]   G6PD pending   Monitor CBC with diff, transfuse as needed.  4/18- Platelets – 38, transfuse one unit platelets    Monitor electrolytes, replete as needed.  Strict I/o  Continue IVF, monitor tumor lysis labs BID  Mouth care.  Resume Venetoclax today, 4/18 - Start Dacogen and Venetoclax today, start Venetoclax 20 mg on 4/18,  50 mg  on 4/19 and 4/20 - Venetoclax 100 mg.  4/1 8 - Lasix 40 mg x1 dose today

## 2022-04-18 NOTE — PROGRESS NOTE ADULT - PROBLEM SELECTOR PLAN 2
CTA chest showing acute subsegmental pulmonary artery embolus in the right upper lobe.   On heparin gtt for AC.  4/16- f/u APTT and  platelet count  Q 6 hrs, if platelet count less than 40K transfuse platelets, keep once >40 K  resume Heparin full A/C, following full AC nomogram. CTA chest showing acute sub segmental pulmonary artery embolus in the right upper lobe.   On heparin gtt for AC.  4/16- f/u APTT and  platelet count  Q 6 hrs, if platelet count less than 40K transfuse platelets,  once >40  resume Heparin full A/C, following full AC nomogram.

## 2022-04-18 NOTE — PROGRESS NOTE ADULT - NS ATTEND AMEND GEN_ALL_CORE FT
89 yo female with MHx sig for T2DM, HTN  here with newly diagnosed AML. She initially presented to PMD with severe weakness, unable to walk, WBC count > 200k. 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 Initial presentation complicated by tumor lysis syndrome, now s/p leukapheresis, rasburicase.  [facility] sent -- PENDING.  Plan for Decitabine + venetoclax (dose adjusted for posa), held venetoclax since 4/16  Day 3    - imrpoved mental status today, monitoring fluid status  - CT chest w contrast 4/12/22: Acute subsegmental pulmonary artery embolus in the right upper lobe. ?right heart strain, patchy bilateral upper lobe opacities may be infectious or inflammatory.  - VTE / PE: Likely exacerbated by severely elevated WBC, now on heparin drip, monitor coags / PTT closely,cont heparin, keep PTT around 60  tx plts to keep over 40 so that heparin can be maintained for at least two wks  - Infectious workup: No current infectious sx, while at [facility] on 4/12/22 Blood Cx, RVP, UA were negative  - TTE with normal LVEF, ? new Afib with episodes of RVR, now on verapamil, follow-up oral DOAC when plts consistently > 50k with treatment of AML  - TLS: Would increase IVF to 75 cc/hr given TLS, s/p rasburicase on 4/12/22  - Shiley for leukapheresis can be removed tomorrow if WBC continue to trend in downward direction  - Continue hydrea and monitor cbc with diff, LDH, uric acid twice daily  - Thrombocytopenia: Transfuse to keep Plt >10k or > 15k if febrile or > 50k if bleeding  - Anemia: Transfuse to maintain Hb > 7.0   - Neutropenic ppx: when ANC < 1000 start levaquin/ posa  - Micaela-anal wound stage 2 (developed at home), wound care  - Maintain good oral hygiene, OOB to chair  - PT eval. 89 yo female with MHx sig for T2DM, HTN  here with newly diagnosed AML. She initially presented to PMD with severe weakness, unable to walk, WBC count > 200k. 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 Initial presentation complicated by tumor lysis syndrome, now s/p leukapheresis, rasburicase.  [facility] sent -- PENDING.  Plan for Decitabine + venetoclax (dose adjusted for posa), held venetoclax since 4/16, restarted 4/18/22  Day 4    - CT chest w contrast 4/12/22: Acute subsegmental pulmonary artery embolus in the right upper lobe. ?right heart strain, patchy bilateral upper lobe opacities may be infectious or inflammatory.  - VTE / PE: Likely exacerbated by severely elevated WBC, now on heparin drip, monitor coags / PTT closely,cont heparin, keep PTT around 60  tx plts to keep over 40 so that heparin can be maintained for at least two wks, US LE negative for DVT  - Infectious workup: No current infectious sx, while at [facility] on 4/12/22 Blood Cx, RVP, UA were negative  - TTE with normal LVEF, ? new Afib with episodes of RVR, now on verapamil, follow-up oral DOAC when plts consistently > 50k with treatment of AML  - TLS: Would increase IVF to 75 cc/hr given TLS, s/p rasburicase on 4/12/22  - Shiley for leukapheresis was removed  - Continue hydrea and monitor cbc with diff, LDH, uric acid twice daily  - Thrombocytopenia: Transfuse to keep Plt >10k or > 15k if febrile or > 50k if bleeding  - Anemia: Transfuse to maintain Hb > 7.0   - Neutropenic ppx: when ANC < 1000 start levaquin/ posa  - Micaela-anal wound stage 2 (developed at home), wound care  - Maintain good oral hygiene, OOB to chair  - PT eval.

## 2022-04-18 NOTE — PROGRESS NOTE ADULT - ASSESSMENT
This is an 89F w/ T2DM, HTN who initially presented to the [facility] ED for palpitations x 3 days and weakness x 1 day. In [facility] ED, pt in a-fib w/ RVR HR 160s, saturating well on RA, BP hypertensive, RR 14. Labs significant for + with 88% blasts concerning for acute leukemia. 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

## 2022-04-18 NOTE — PROGRESS NOTE ADULT - SUBJECTIVE AND OBJECTIVE BOX
Diagnosis:    Protocol/Chemo Regimen:    Day:     Pt endorsed:    Review of Systems:     Pain scale:     Diet:     Allergies    No Known Allergies    Intolerances        ANTIMICROBIALS  levoFLOXacin  Tablet 250 milliGRAM(s) Oral every 24 hours  posaconazole DR Tablet 300 milliGRAM(s) Oral daily      HEME/ONC MEDICATIONS  decitabine IVPB (eMAR) 35 milliGRAM(s) IV Intermittent every 24 hours  heparin   Injectable 6500 Unit(s) IV Push every 6 hours PRN  heparin   Injectable 3000 Unit(s) IV Push every 6 hours PRN  heparin  Infusion.  Unit(s)/Hr IV Continuous <Continuous>      STANDING MEDICATIONS  allopurinol 300 milliGRAM(s) Oral daily  benzonatate 100 milliGRAM(s) Oral every 8 hours  Biotene Dry Mouth Oral Rinse 15 milliLiter(s) Swish and Spit three times a day  calcium acetate 667 milliGRAM(s) Oral four times a day with meals  dextrose 5%. 1000 milliLiter(s) IV Continuous <Continuous>  dextrose 5%. 1000 milliLiter(s) IV Continuous <Continuous>  dextrose 50% Injectable 25 Gram(s) IV Push once  dextrose 50% Injectable 12.5 Gram(s) IV Push once  dextrose 50% Injectable 25 Gram(s) IV Push once  famotidine    Tablet 20 milliGRAM(s) Oral daily  glucagon  Injectable 1 milliGRAM(s) IntraMuscular once  insulin glargine Injectable (LANTUS) 15 Unit(s) SubCutaneous at bedtime  insulin lispro (ADMELOG) corrective regimen sliding scale   SubCutaneous three times a day before meals  insulin lispro (ADMELOG) corrective regimen sliding scale   SubCutaneous at bedtime  insulin lispro Injectable (ADMELOG) 3 Unit(s) SubCutaneous three times a day before meals  metoprolol tartrate 50 milliGRAM(s) Oral two times a day  ondansetron Injectable 8 milliGRAM(s) IV Push every 24 hours  sodium chloride 0.9%. 1000 milliLiter(s) IV Continuous <Continuous>  verapamil  milliGRAM(s) Oral daily      PRN MEDICATIONS  acetaminophen     Tablet .. 650 milliGRAM(s) Oral every 6 hours PRN  aluminum hydroxide/magnesium hydroxide/simethicone Suspension 30 milliLiter(s) Oral every 4 hours PRN  dextrose Oral Gel 15 Gram(s) Oral once PRN        Vital Signs Last 24 Hrs  T(C): 36.9 (18 Apr 2022 04:57), Max: 37.2 (17 Apr 2022 21:00)  T(F): 98.4 (18 Apr 2022 04:57), Max: 99 (17 Apr 2022 21:00)  HR: 74 (18 Apr 2022 04:57) (64 - 80)  BP: 126/96 (18 Apr 2022 04:57) (113/70 - 148/60)  BP(mean): --  RR: 18 (18 Apr 2022 04:57) (18 - 18)  SpO2: 98% (18 Apr 2022 04:57) (96% - 98%)    PHYSICAL EXAM  General: NAD  HEENT: PERRLA, EOMOI, clear oropharynx, anicteric sclera, pink conjunctiva  Neck: supple  CV: (+) S1/S2 RRR  Lungs: clear to auscultation, no wheezes or rales  Abdomen: soft, non-tender, non-distended (+) BS  Ext: no clubbing, cyanosis or edema  Skin: no rashes and no petechiae  Neuro: alert and oriented X 3, no focal deficits  Central Line:     RECENT CULTURES:  04-12 @ 13:25  .Blood Blood-Peripheral  --  --  --    No Growth Final  --  04-12 @ 13:21  .Blood Blood-Peripheral  --  --  --    No Growth Final  --        LABS:                        x      x     )-----------( 45       ( 18 Apr 2022 00:55 )             x            Mean Cell Volume : 90.5 fl  Mean Cell Hemoglobin : 30.2 pg  Mean Cell Hemoglobin Concentration : 33.3 gm/dL  Auto Neutrophil # : x  Auto Lymphocyte # : x  Auto Monocyte # : x  Auto Eosinophil # : x  Auto Basophil # : x  Auto Neutrophil % : x  Auto Lymphocyte % : x  Auto Monocyte % : x  Auto Eosinophil % : x  Auto Basophil % : x      04-17    141  |  102  |  20  ----------------------------<  132<H>  3.4<L>   |  27  |  0.73    Ca    7.7<L>      17 Apr 2022 18:31  Phos  2.4     04-17  Mg     1.7     04-17    TPro  6.1  /  Alb  3.1<L>  /  TBili  1.6<H>  /  DBili  x   /  AST  40  /  ALT  31  /  AlkPhos  59  04-17      Mg 1.7  Phos 2.4  Mg 1.9  Phos 3.2      PTT - ( 18 Apr 2022 00:55 )  PTT:61.2 sec      Uric Acid 2.7      Uric Acid 2.9        RADIOLOGY & ADDITIONAL STUDIES:         Diagnosis:    Protocol/Chemo Regimen:    Day:     Pt endorsed:    Review of Systems:     Pain scale:     Diet:     Allergies    No Known Allergies    Intolerances        ANTIMICROBIALS  levoFLOXacin  Tablet 250 milliGRAM(s) Oral every 24 hours  posaconazole DR Tablet 300 milliGRAM(s) Oral daily      HEME/ONC MEDICATIONS  decitabine IVPB (eMAR) 35 milliGRAM(s) IV Intermittent every 24 hours  heparin   Injectable 6500 Unit(s) IV Push every 6 hours PRN  heparin   Injectable 3000 Unit(s) IV Push every 6 hours PRN  heparin  Infusion.  Unit(s)/Hr IV Continuous <Continuous>      STANDING MEDICATIONS  allopurinol 300 milliGRAM(s) Oral daily  benzonatate 100 milliGRAM(s) Oral every 8 hours  Biotene Dry Mouth Oral Rinse 15 milliLiter(s) Swish and Spit three times a day  calcium acetate 667 milliGRAM(s) Oral four times a day with meals  dextrose 5%. 1000 milliLiter(s) IV Continuous <Continuous>  dextrose 5%. 1000 milliLiter(s) IV Continuous <Continuous>  dextrose 50% Injectable 25 Gram(s) IV Push once  dextrose 50% Injectable 12.5 Gram(s) IV Push once  dextrose 50% Injectable 25 Gram(s) IV Push once  famotidine    Tablet 20 milliGRAM(s) Oral daily  glucagon  Injectable 1 milliGRAM(s) IntraMuscular once  insulin glargine Injectable (LANTUS) 15 Unit(s) SubCutaneous at bedtime  insulin lispro (ADMELOG) corrective regimen sliding scale   SubCutaneous three times a day before meals  insulin lispro (ADMELOG) corrective regimen sliding scale   SubCutaneous at bedtime  insulin lispro Injectable (ADMELOG) 3 Unit(s) SubCutaneous three times a day before meals  metoprolol tartrate 50 milliGRAM(s) Oral two times a day  ondansetron Injectable 8 milliGRAM(s) IV Push every 24 hours  sodium chloride 0.9%. 1000 milliLiter(s) IV Continuous <Continuous>  verapamil  milliGRAM(s) Oral daily      PRN MEDICATIONS  acetaminophen     Tablet .. 650 milliGRAM(s) Oral every 6 hours PRN  aluminum hydroxide/magnesium hydroxide/simethicone Suspension 30 milliLiter(s) Oral every 4 hours PRN  dextrose Oral Gel 15 Gram(s) Oral once PRN        Vital Signs Last 24 Hrs  T(C): 36.9 (18 Apr 2022 04:57), Max: 37.2 (17 Apr 2022 21:00)  T(F): 98.4 (18 Apr 2022 04:57), Max: 99 (17 Apr 2022 21:00)  HR: 74 (18 Apr 2022 04:57) (64 - 80)  BP: 126/96 (18 Apr 2022 04:57) (113/70 - 148/60)  BP(mean): --  RR: 18 (18 Apr 2022 04:57) (18 - 18)  SpO2: 98% (18 Apr 2022 04:57) (96% - 98%)    PHYSICAL EXAM  General: NAD, resting in bed.  HEENT: PERRLA, EOMOI, clear oropharynx  Neck: supple  CV: (+) S1/S2 RRR  Lungs: Crackles +  Abdomen: soft, non-tender, non-distended (+) BS  Ext: no clubbing, cyanosis or edema  Skin: no rashes and no petechiae  Neuro: alert and oriented X 3, no focal deficits  Central Line: PICC line in left UE,right PIV (arrow cath),CHIOMA Mascorro  CDI    RECENT CULTURES:  04-12 @ 13:25  .Blood Blood-Peripheral    No Growth Final  --  04-12 @ 13:21  .Blood Blood-Peripheral  --  --  --    No Growth Final  --        LABS:                        x      x     )-----------( 45       ( 18 Apr 2022 00:55 )             x            Mean Cell Volume : 90.5 fl  Mean Cell Hemoglobin : 30.2 pg  Mean Cell Hemoglobin Concentration : 33.3 gm/dL  Auto Neutrophil # : x  Auto Lymphocyte # : x  Auto Monocyte # : x  Auto Eosinophil # : x  Auto Basophil # : x  Auto Neutrophil % : x  Auto Lymphocyte % : x  Auto Monocyte % : x  Auto Eosinophil % : x  Auto Basophil % : x      04-17    141  |  102  |  20  ----------------------------<  132<H>  3.4<L>   |  27  |  0.73    Ca    7.7<L>      17 Apr 2022 18:31  Phos  2.4     04-17  Mg     1.7     04-17    TPro  6.1  /  Alb  3.1<L>  /  TBili  1.6<H>  /  DBili  x   /  AST  40  /  ALT  31  /  AlkPhos  59  04-17      Mg 1.7  Phos 2.4  Mg 1.9  Phos 3.2      PTT - ( 18 Apr 2022 00:55 )  PTT:61.2 sec      Uric Acid 2.7      Uric Acid 2.9        RADIOLOGY & ADDITIONAL STUDIES:         Diagnosis: AML    Protocol/Chemo Regimen: Dacogen/Venetocla (Venetocalx 20 mg on 4/15, 50 mg on 4/16 and continue 100 mg starting 4/17).    Day: 4    Pt endorsed:   feeling better today  breathing better today.    Review of Systems: Denies any chest pain, palpitation, abdominal pain, nausea, vomiting or diarrhea.    Pain scale: Denies    Diet: Consistent carb     Allergies:  Known Allergies    ANTIMICROBIALS  levoFLOXacin  Tablet 250 milliGRAM(s) Oral every 24 hours  posaconazole DR Tablet 300 milliGRAM(s) Oral daily    HEME/ONC MEDICATIONS  decitabine IVPB (eMAR) 35 milliGRAM(s) IV Intermittent every 24 hours  heparin   Injectable 6500 Unit(s) IV Push every 6 hours PRN  heparin   Injectable 3000 Unit(s) IV Push every 6 hours PRN  heparin  Infusion.  Unit(s)/Hr IV Continuous <Continuous>    STANDING MEDICATIONS  allopurinol 300 milliGRAM(s) Oral daily  benzonatate 100 milliGRAM(s) Oral every 8 hours  Biotene Dry Mouth Oral Rinse 15 milliLiter(s) Swish and Spit three times a day  calcium acetate 667 milliGRAM(s) Oral four times a day with meals  dextrose 5%. 1000 milliLiter(s) IV Continuous <Continuous>  dextrose 5%. 1000 milliLiter(s) IV Continuous <Continuous>  dextrose 50% Injectable 25 Gram(s) IV Push once  dextrose 50% Injectable 12.5 Gram(s) IV Push once  dextrose 50% Injectable 25 Gram(s) IV Push once  famotidine    Tablet 20 milliGRAM(s) Oral daily  glucagon  Injectable 1 milliGRAM(s) IntraMuscular once  insulin glargine Injectable (LANTUS) 15 Unit(s) SubCutaneous at bedtime  insulin lispro (ADMELOG) corrective regimen sliding scale   SubCutaneous three times a day before meals  insulin lispro (ADMELOG) corrective regimen sliding scale   SubCutaneous at bedtime  insulin lispro Injectable (ADMELOG) 3 Unit(s) SubCutaneous three times a day before meals  metoprolol tartrate 50 milliGRAM(s) Oral two times a day  ondansetron Injectable 8 milliGRAM(s) IV Push every 24 hours  sodium chloride 0.9%. 1000 milliLiter(s) IV Continuous <Continuous>  verapamil  milliGRAM(s) Oral daily    PRN MEDICATIONS  acetaminophen     Tablet .. 650 milliGRAM(s) Oral every 6 hours PRN  aluminum hydroxide/magnesium hydroxide/simethicone Suspension 30 milliLiter(s) Oral every 4 hours PRN  dextrose Oral Gel 15 Gram(s) Oral once PRN    Vital Signs Last 24 Hrs  T(C): 36.9 (18 Apr 2022 04:57), Max: 37.2 (17 Apr 2022 21:00)  T(F): 98.4 (18 Apr 2022 04:57), Max: 99 (17 Apr 2022 21:00)  HR: 74 (18 Apr 2022 04:57) (64 - 80)  BP: 126/96 (18 Apr 2022 04:57) (113/70 - 148/60)  BP(mean): --  RR: 18 (18 Apr 2022 04:57) (18 - 18)  SpO2: 98% (18 Apr 2022 04:57) (96% - 98%)    PHYSICAL EXAM  General: NAD, resting in bed.  HEENT: PERRLA, EOMOI, clear oropharynx  Neck: supple  CV: (+) S1/S2 RRR  Lungs: Crackles +  Abdomen: soft, non-tender, non-distended (+) BS  Ext: no clubbing, cyanosis or edema  Skin: no rashes and no petechiae  Neuro: alert and oriented X 3, no focal deficits  Central Line: PICC line in left UE,right PIV (arrow cath).    RECENT CULTURES:  04-12 @ 13:25  .Blood Blood-Peripheral  No Growth Final    04-12 @ 13:21  .Blood Blood-Peripheral  No Growth Final    LABS:                            7.2    1.01  )-----------( 43       ( 18 Apr 2022 07:06 )             22.0     Mean Cell Volume : 92.1 fl  Mean Cell Hemoglobin : 30.1 pg  Mean Cell Hemoglobin Concentration : 32.7 gm/dL  Auto Neutrophil # : 0.08 K/uL  Auto Lymphocyte # : 0.83 K/uL  Auto Monocyte # : 0.09 K/uL  Auto Eosinophil # : 0.00 K/uL  Auto Basophil # : 0.00 K/uL  Auto Neutrophil % : 7.5 %  Auto Lymphocyte % : 82.1 %  Auto Monocyte % : 8.9 %  Auto Eosinophil % : 0.0 %  Auto Basophil % : 0.0 %    04-18    138  |  104  |  16  ----------------------------<  56<L>  4.2   |  26  |  0.74    Ca    7.4<L>      18 Apr 2022 07:03  Phos  2.9     04-18  Mg     1.6     04-18    TPro  5.3<L>  /  Alb  2.8<L>  /  TBili  1.1  /  DBili  x   /  AST  31  /  ALT  24  /  AlkPhos  51  04-18  Mg 1.6  Phos 2.9  Mg 1.7  Phos 2.4    PT/INR - ( 18 Apr 2022 07:07 )   PT: 17.2 sec;   INR: 1.49 ratio     PTT - ( 18 Apr 2022 07:07 )  PTT:63.0 sec    Uric Acid 2.3    Uric Acid 2.7    RADIOLOGY & ADDITIONAL STUDIES:   VA Duplex Lower Ext Vein Scan, Bilinder (04.18.22 @ 09:57) >  No evidence of deep venous thrombosis in either lower extremity.     Diagnosis: AML    Protocol/Chemo Regimen: Dacogen/Venetocla (Venetocalx 20 mg on 4/15, 50 mg on 4/16 and continue 100 mg starting 4/17).    Day: 4    Pt endorsed:   feeling better today  breathing better today.    Review of Systems: Denies any chest pain, palpitation, abdominal pain, nausea, vomiting or diarrhea.    Pain scale: Denies    Diet: Consistent carb     Allergies:  Known Allergies    ANTIMICROBIALS  levoFLOXacin  Tablet 250 milliGRAM(s) Oral every 24 hours  posaconazole DR Tablet 300 milliGRAM(s) Oral daily    HEME/ONC MEDICATIONS  decitabine IVPB (eMAR) 35 milliGRAM(s) IV Intermittent every 24 hours  heparin   Injectable 6500 Unit(s) IV Push every 6 hours PRN  heparin   Injectable 3000 Unit(s) IV Push every 6 hours PRN  heparin  Infusion.  Unit(s)/Hr IV Continuous <Continuous>    STANDING MEDICATIONS  allopurinol 300 milliGRAM(s) Oral daily  benzonatate 100 milliGRAM(s) Oral every 8 hours  Biotene Dry Mouth Oral Rinse 15 milliLiter(s) Swish and Spit three times a day  calcium acetate 667 milliGRAM(s) Oral four times a day with meals  dextrose 5%. 1000 milliLiter(s) IV Continuous <Continuous>  dextrose 5%. 1000 milliLiter(s) IV Continuous <Continuous>  dextrose 50% Injectable 25 Gram(s) IV Push once  dextrose 50% Injectable 12.5 Gram(s) IV Push once  dextrose 50% Injectable 25 Gram(s) IV Push once  famotidine    Tablet 20 milliGRAM(s) Oral daily  glucagon  Injectable 1 milliGRAM(s) IntraMuscular once  insulin glargine Injectable (LANTUS) 15 Unit(s) SubCutaneous at bedtime  insulin lispro (ADMELOG) corrective regimen sliding scale   SubCutaneous three times a day before meals  insulin lispro (ADMELOG) corrective regimen sliding scale   SubCutaneous at bedtime  insulin lispro Injectable (ADMELOG) 3 Unit(s) SubCutaneous three times a day before meals  metoprolol tartrate 50 milliGRAM(s) Oral two times a day  ondansetron Injectable 8 milliGRAM(s) IV Push every 24 hours  sodium chloride 0.9%. 1000 milliLiter(s) IV Continuous <Continuous>  verapamil  milliGRAM(s) Oral daily    PRN MEDICATIONS  acetaminophen     Tablet .. 650 milliGRAM(s) Oral every 6 hours PRN  aluminum hydroxide/magnesium hydroxide/simethicone Suspension 30 milliLiter(s) Oral every 4 hours PRN  dextrose Oral Gel 15 Gram(s) Oral once PRN    Vital Signs Last 24 Hrs  T(C): 36.9 (18 Apr 2022 04:57), Max: 37.2 (17 Apr 2022 21:00)  T(F): 98.4 (18 Apr 2022 04:57), Max: 99 (17 Apr 2022 21:00)  HR: 74 (18 Apr 2022 04:57) (64 - 80)  BP: 126/96 (18 Apr 2022 04:57) (113/70 - 148/60)  BP(mean): --  RR: 18 (18 Apr 2022 04:57) (18 - 18)  SpO2: 98% (18 Apr 2022 04:57) (96% - 98%)    PHYSICAL EXAM  General: NAD, resting in bed.  HEENT: PERRLA, EOMOI, clear oropharynx  Neck: supple  CV: (+) S1/S2 RRR  Lungs: Crackles +  Abdomen: soft, non-tender, non-distended (+) BS  Ext: BLE pedal edema trace +.  Skin: no rashes and no petechiae  Neuro: alert and oriented X 3, no focal deficits  Central Line: PICC line in left UE,right PIV (arrow cath).    RECENT CULTURES:  04-12 @ 13:25  .Blood Blood-Peripheral  No Growth Final    04-12 @ 13:21  .Blood Blood-Peripheral  No Growth Final    LABS:                            7.2    1.01  )-----------( 43       ( 18 Apr 2022 07:06 )             22.0     Mean Cell Volume : 92.1 fl  Mean Cell Hemoglobin : 30.1 pg  Mean Cell Hemoglobin Concentration : 32.7 gm/dL  Auto Neutrophil # : 0.08 K/uL  Auto Lymphocyte # : 0.83 K/uL  Auto Monocyte # : 0.09 K/uL  Auto Eosinophil # : 0.00 K/uL  Auto Basophil # : 0.00 K/uL  Auto Neutrophil % : 7.5 %  Auto Lymphocyte % : 82.1 %  Auto Monocyte % : 8.9 %  Auto Eosinophil % : 0.0 %  Auto Basophil % : 0.0 %    04-18    138  |  104  |  16  ----------------------------<  56<L>  4.2   |  26  |  0.74    Ca    7.4<L>      18 Apr 2022 07:03  Phos  2.9     04-18  Mg     1.6     04-18    TPro  5.3<L>  /  Alb  2.8<L>  /  TBili  1.1  /  DBili  x   /  AST  31  /  ALT  24  /  AlkPhos  51  04-18  Mg 1.6  Phos 2.9  Mg 1.7  Phos 2.4    PT/INR - ( 18 Apr 2022 07:07 )   PT: 17.2 sec;   INR: 1.49 ratio     PTT - ( 18 Apr 2022 07:07 )  PTT:63.0 sec    Uric Acid 2.3    Uric Acid 2.7    RADIOLOGY & ADDITIONAL STUDIES:   VA Duplex Lower Ext Vein Scan, Anali (04.18.22 @ 09:57) >  No evidence of deep venous thrombosis in either lower extremity.

## 2022-04-19 NOTE — PHYSICAL THERAPY INITIAL EVALUATION ADULT - ADDITIONAL COMMENTS
PTA pt states she ambulates independently with a cane, also performed ADLs independently-sometimes uses shower chair. Pt may own a rolling walker

## 2022-04-19 NOTE — PROGRESS NOTE ADULT - PROBLEM SELECTOR PLAN 1
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   Peripheral Flow cytometry confirming CD33+, MPO+, CD34- AML. FLT3 and BCR-ABL pending   Pathologist Dr. Chua identified Antoni rods, thus ATRA (tretinoin) was started on 4/13 40mg q12h. Due to concern for possible differentiation syndrome given the patient's leukocytosis and suspected myeloid malignancy, started dexamethasone 10mg q12h on 4/13. FISH now confirming negative PML-LOREN on 4/14. 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 Foundation sent   G6PD pending   Monitor CBC with diff, transfuse as needed.  4/18- Platelets – 38, transfuse one unit platelets    Monitor electrolytes, replete as needed.  Strict I/o  Continue IVF, monitor tumor lysis labs BID  Mouth care.  Resume Venetoclax today, 4/18 - Start Dacogen and Venetoclax today, start Venetoclax 20 mg on 4/18,  50 mg  on 4/19 and 4/20 - Venetoclax 100 mg.  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   Peripheral Flow cytometry confirming CD33+, MPO+, CD34- AML. FLT3 and BCR-ABL pending   Pathologist Dr. Chua identified Antoni rods, thus ATRA (tretinoin) was started on 4/13 40mg q12h. Due to concern for possible differentiation syndrome given the patient's leukocytosis and suspected myeloid malignancy, started dexamethasone 10mg q12h on 4/13. FISH now confirming negative PML-LOREN on 4/14. 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 Foundation sent   G6PD pending   Monitor CBC with diff, transfuse as needed.  4/18- Platelets – 38, transfuse one unit platelets    Monitor electrolytes, replete as needed.  Strict I/o  Continue IVF, monitor tumor lysis labs BID  Mouth care.  4/15 Started Dacogen and Venetoclax  held venetoclax since 4/16, restarted 4/18/22 4/19 EPIC application given to family, plan to submit 4/21

## 2022-04-19 NOTE — PROGRESS NOTE ADULT - SUBJECTIVE AND OBJECTIVE BOX
Diagnosis: AML    Protocol/Chemo Regimen: Dacogen/Venetoclax (Venetoclax 20 mg on 4/15, 50 mg on 4/16 and continue 100 mg starting 4/17).    Day: 5     Pt endorsed:    Review of Systems:      Pain scale:                                        Location:    Diet:     Allergies: No Known Allergies      ANTIMICROBIALS  levoFLOXacin  Tablet 250 milliGRAM(s) Oral every 24 hours  posaconazole DR Tablet 300 milliGRAM(s) Oral daily      HEME/ONC MEDICATIONS  decitabine IVPB (eMAR) 35 milliGRAM(s) IV Intermittent every 24 hours  heparin   Injectable 3000 Unit(s) IV Push every 6 hours PRN  heparin   Injectable 6500 Unit(s) IV Push every 6 hours PRN  heparin  Infusion. 800 Unit(s)/Hr IV Continuous <Continuous>  venetoclax 50 milliGRAM(s) Oral once      STANDING MEDICATIONS  allopurinol 300 milliGRAM(s) Oral daily  benzonatate 100 milliGRAM(s) Oral every 8 hours  Biotene Dry Mouth Oral Rinse 15 milliLiter(s) Swish and Spit three times a day  chlorhexidine 4% Liquid 1 Application(s) Topical <User Schedule>  dextrose 5%. 1000 milliLiter(s) IV Continuous <Continuous>  dextrose 5%. 1000 milliLiter(s) IV Continuous <Continuous>  dextrose 50% Injectable 25 Gram(s) IV Push once  dextrose 50% Injectable 12.5 Gram(s) IV Push once  dextrose 50% Injectable 25 Gram(s) IV Push once  famotidine    Tablet 20 milliGRAM(s) Oral daily  glucagon  Injectable 1 milliGRAM(s) IntraMuscular once  insulin glargine Injectable (LANTUS) 15 Unit(s) SubCutaneous at bedtime  insulin lispro (ADMELOG) corrective regimen sliding scale   SubCutaneous three times a day before meals  insulin lispro (ADMELOG) corrective regimen sliding scale   SubCutaneous at bedtime  insulin lispro Injectable (ADMELOG) 3 Unit(s) SubCutaneous three times a day before meals  metoprolol tartrate 50 milliGRAM(s) Oral two times a day  ondansetron Injectable 8 milliGRAM(s) IV Push every 24 hours  phytonadione   Solution 5 milliGRAM(s) Oral daily  sodium chloride 0.9%. 1000 milliLiter(s) IV Continuous <Continuous>  verapamil  milliGRAM(s) Oral daily      PRN MEDICATIONS  acetaminophen     Tablet .. 650 milliGRAM(s) Oral every 6 hours PRN  aluminum hydroxide/magnesium hydroxide/simethicone Suspension 30 milliLiter(s) Oral every 4 hours PRN  dextrose Oral Gel 15 Gram(s) Oral once PRN      Vital Signs Last 24 Hrs  T(C): 37 (19 Apr 2022 05:11), Max: 37.4 (19 Apr 2022 01:05)  T(F): 98.6 (19 Apr 2022 05:11), Max: 99.3 (19 Apr 2022 01:05)  HR: 96 (19 Apr 2022 05:11) (66 - 96)  BP: 131/71 (19 Apr 2022 05:11) (96/55 - 147/75)  RR: 18 (19 Apr 2022 05:11) (18 - 20)  SpO2: 96% (19 Apr 2022 05:11) (96% - 100%)    PHYSICAL EXAM  General: adult in NAD  HEENT: clear oropharynx, anicteric sclera, pink conjunctiva  Neck: supple  CV: normal S1/S2 RRR  Lungs: positive air movement b/l ant lungs,clear to auscultation, no wheezes, no rales  Abdomen: soft non-tender non-distended, no hepatosplenomegaly  Ext: no clubbing cyanosis or edema  Skin: no rashes and no petechiae  Neuro: alert and oriented X 4, no focal deficits  Central Line: normal    LABS:    Blood Cultures:         RADIOLOGY & ADDITIONAL STUDIES:         Diagnosis: AML    Protocol/Chemo Regimen: Dacogen/Venetoclax (Venetoclax 20 mg on 4/15, 50 mg on 4/16 and continue 100 mg starting 4/17).    Day: 5     Pt endorsed: general fatigue, weakness, epigastric discomfort    Review of Systems: denies nausea, vomiting, chest pain    Pain scale:  0                                         Diet: consistent carbohydrates    Allergies: No Known Allergies      ANTIMICROBIALS  levoFLOXacin  Tablet 250 milliGRAM(s) Oral every 24 hours  posaconazole DR Tablet 300 milliGRAM(s) Oral daily      HEME/ONC MEDICATIONS  decitabine IVPB (eMAR) 35 milliGRAM(s) IV Intermittent every 24 hours  heparin   Injectable 3000 Unit(s) IV Push every 6 hours PRN  heparin   Injectable 6500 Unit(s) IV Push every 6 hours PRN  heparin  Infusion. 800 Unit(s)/Hr IV Continuous <Continuous>  venetoclax 50 milliGRAM(s) Oral once      STANDING MEDICATIONS  allopurinol 300 milliGRAM(s) Oral daily  benzonatate 100 milliGRAM(s) Oral every 8 hours  Biotene Dry Mouth Oral Rinse 15 milliLiter(s) Swish and Spit three times a day  chlorhexidine 4% Liquid 1 Application(s) Topical <User Schedule>  dextrose 5%. 1000 milliLiter(s) IV Continuous <Continuous>  dextrose 5%. 1000 milliLiter(s) IV Continuous <Continuous>  dextrose 50% Injectable 25 Gram(s) IV Push once  dextrose 50% Injectable 12.5 Gram(s) IV Push once  dextrose 50% Injectable 25 Gram(s) IV Push once  famotidine    Tablet 20 milliGRAM(s) Oral daily  glucagon  Injectable 1 milliGRAM(s) IntraMuscular once  insulin glargine Injectable (LANTUS) 15 Unit(s) SubCutaneous at bedtime  insulin lispro (ADMELOG) corrective regimen sliding scale   SubCutaneous three times a day before meals  insulin lispro (ADMELOG) corrective regimen sliding scale   SubCutaneous at bedtime  insulin lispro Injectable (ADMELOG) 3 Unit(s) SubCutaneous three times a day before meals  metoprolol tartrate 50 milliGRAM(s) Oral two times a day  ondansetron Injectable 8 milliGRAM(s) IV Push every 24 hours  phytonadione   Solution 5 milliGRAM(s) Oral daily  sodium chloride 0.9%. 1000 milliLiter(s) IV Continuous <Continuous>  verapamil  milliGRAM(s) Oral daily      PRN MEDICATIONS  acetaminophen     Tablet .. 650 milliGRAM(s) Oral every 6 hours PRN  aluminum hydroxide/magnesium hydroxide/simethicone Suspension 30 milliLiter(s) Oral every 4 hours PRN  dextrose Oral Gel 15 Gram(s) Oral once PRN      Vital Signs Last 24 Hrs  T(C): 37 (19 Apr 2022 05:11), Max: 37.4 (19 Apr 2022 01:05)  T(F): 98.6 (19 Apr 2022 05:11), Max: 99.3 (19 Apr 2022 01:05)  HR: 96 (19 Apr 2022 05:11) (66 - 96)  BP: 131/71 (19 Apr 2022 05:11) (96/55 - 147/75)  RR: 18 (19 Apr 2022 05:11) (18 - 20)  SpO2: 96% (19 Apr 2022 05:11) (96% - 100%)    PHYSICAL EXAM  General: NAD, resting in bed.  HEENT: PERRLA, EOMOI, clear oropharynx  Neck: supple  CV: (+) S1/S2 RRR  Lungs: Crackles +  Abdomen: soft, non-tender, non-distended (+) BS  Ext: BLE pedal edema trace +.  Skin: no rashes and no petechiae  Neuro: alert and oriented X 3, no focal deficits  Central Line: PICC line in left UE,right PIV (arrow cath).    RECENT CULTURES:  04-12 @ 13:25  .Blood Blood-Peripheral  No Growth Final    04-12 @ 13:21  .Blood Blood-Peripheral  No Growth Final    LABS:             8.7    0.82  )-----------( 42       ( 19 Apr 2022 19:11 )             26.4     19 Apr 2022 06:58    138    |  102    |  14     ----------------------------<  58     3.4     |  24     |  0.73     Ca    7.5        19 Apr 2022 06:58  Phos  2.6       19 Apr 2022 06:58  Mg     1.8       19 Apr 2022 06:58    TPro  5.9    /  Alb  2.8    /  TBili  0.7    /  DBili  x      /  AST  22     /  ALT  22     /  AlkPhos  56     19 Apr 2022 06:58    PT/INR - ( 19 Apr 2022 13:57 )   PT: 15.3 sec;   INR: 1.32 ratio         PTT - ( 19 Apr 2022 19:11 )  PTT:49.5 sec  CAPILLARY BLOOD GLUCOSE      POCT Blood Glucose.: 76 mg/dL (19 Apr 2022 22:11)  POCT Blood Glucose.: 74 mg/dL (19 Apr 2022 21:15)  POCT Blood Glucose.: 158 mg/dL (19 Apr 2022 17:03)  POCT Blood Glucose.: 224 mg/dL (19 Apr 2022 11:51)  POCT Blood Glucose.: 114 mg/dL (19 Apr 2022 09:17)  POCT Blood Glucose.: 69 mg/dL (19 Apr 2022 08:50)  POCT Blood Glucose.: 62 mg/dL (19 Apr 2022 08:29)    LIVER FUNCTIONS - ( 19 Apr 2022 06:58 )  Alb: 2.8 g/dL / Pro: 5.9 g/dL / ALK PHOS: 56 U/L / ALT: 22 U/L / AST: 22 U/L / GGT: x

## 2022-04-19 NOTE — ADVANCED PRACTICE NURSE CONSULT - REASON FOR CONSULT
Chemo note: Day 5/5 Dacogen
Chemo note: Day 1 Dacogen; consent in chart
Chemo note: Day 2/5 Dacogen; consent in chart.>INduction
Chemo note: Day 3/5 Dacogen; consent in chart.INduction
Chemo note: Day 4/5 Dacogen; consent in chart.INduction

## 2022-04-19 NOTE — PHYSICAL THERAPY INITIAL EVALUATION ADULT - LIVES WITH, PROFILE
Pt lives with her children -daughter and granddaughter in a house with 4-5 steps to enter with +railing and remains on 1st floor/children

## 2022-04-19 NOTE — PROVIDER CONTACT NOTE (OTHER) - SITUATION
BP = 173/87. Pt. is feeling anxious and says she is having a panic attack similar to ones she has had in the past at home.

## 2022-04-19 NOTE — PROVIDER CONTACT NOTE (CRITICAL VALUE NOTIFICATION) - PERSON GIVING RESULT:
Anand Mejia, lab
Joanie Lafleur
Valente Calzada, lab
Patience Bowen/ Ruperto
ARMANI Torres, lab
Paul Tay
Hamilton Hernandez

## 2022-04-19 NOTE — CHART NOTE - NSCHARTNOTEFT_GEN_A_CORE
Medicine PA Episodic Note    Patient is a 89y old  Female who presents with a chief complaint of AML (19 Apr 2022 07:19)    Notified by RN of pt. having anxiety/panic attack. Pt. seen and examined at bedside, AOx3, visibly uncomfortable, stating that she's having a panic attack. Pt. endorses that the panic attack usually resolves on it's own. Pt. denies any HA, blurry vision, vision changes, CP, palpitations, difficulty breathing. On exam, pt. noted to have b/l expiratory wheezing & crackles heard on exam.     Vital Signs Last 24 Hrs  T(C): 37.1 (19 Apr 2022 21:10), Max: 37.4 (19 Apr 2022 01:05)  T(F): 98.8 (19 Apr 2022 21:10), Max: 99.3 (19 Apr 2022 01:05)  HR: 90 (19 Apr 2022 21:10) (52 - 96)  BP: 173/87 (19 Apr 2022 21:10) (114/53 - 173/87)  BP(mean): --  RR: 20 (19 Apr 2022 21:10) (16 - 20)  SpO2: 99% (19 Apr 2022 21:10) (96% - 100%)      Labs:                          8.7    0.82  )-----------( 42       ( 19 Apr 2022 19:11 )             26.4     04-19    138  |  102  |  14  ----------------------------<  58<L>  3.4<L>   |  24  |  0.73    Ca    7.5<L>      19 Apr 2022 06:58  Phos  2.6     04-19  Mg     1.8     04-19    TPro  5.9<L>  /  Alb  2.8<L>  /  TBili  0.7  /  DBili  x   /  AST  22  /  ALT  22  /  AlkPhos  56  04-19        Radiology:  < from: Xray Chest 1 View- PORTABLE-Urgent (Xray Chest 1 View- PORTABLE-Urgent .) (04.16.22 @ 13:08) >      ACC: [id number] EXAM:  XR CHEST PORTABLE URGENT 1V                          PROCEDURE DATE:  04/16/2022          INTERPRETATION:  Chest one view    HISTORY: Shortness of breath    COMPARISON STUDY: 4/15/2022    Frontal expiratory view of the chest shows the heart to be similar in   size. Left PICC is again noted.    The lungs show partial clearing of pulmonary infiltrates and there is no   evidence of pneumothorax nor pleural effusion.    IMPRESSION:  Resolving infiltrates.        Thank you for the courtesy of this referral.    --- End of Report ---            DAVID MCKEON MD; Attending Interventional Radiologist  This document has been electronically signed. Apr 17 2022 10:34AM    < end of copied text >        Physical Exam:  General: WN/WD NAD  Neurology: A&Ox3, nonfocal, CARLISLE x 4  Head:  Normocephalic, atraumatic  Respiratory: b/l expiratory wheezing  CV: RRR, S1S2, no murmur  Abdominal: Soft, NT, ND no palpable mass  MSK: No edema, + peripheral pulses, FROM all 4 extremity    Assessment & Plan:  HPI:  89F w/ T2DM, HTN who initially presented to the [facility] ED for palpitations x 3 days and weakness x 1 day. She felt very weak and lightheaded while standing today, feeling like she would pass out so this prompted her to come to the ED. Recently she also had a cough x 3 weeks. She was seen by PMD who felt this was likely a URI. Pt continued to have cough with weakness and HR in 140s. Pt denies any fever, chills, SOB, chest pain, or and pain. She did not take her medications this morning because she was feeling unwell. She also does not take her metformin regularly.      In [facility] ED, pt in a-fib w/ RVR HR 160s, saturating well on RA, BP hypertensive, RR 14. Labs significant for + with 88% blasts concerning for acute leukemia. Heme consulted in ED, patient transferred to MICU for urgent leukaphereses. Received [facility] shiley and leukapheresis with improvement in counts. Flow cytometry showed AML. Bone marrow bx done on 4/13. Symptoms improved, transferred to medicine floor for further management. Now transferred to [facility] for further management   (14 Apr 2022 14:19)    Now c/o anxiety, panic attack and visible tachypnea.    Plan:  #Tachypnea ? 2/2 pulm. edema vs. anxiety  - CXR urgent ordered  - Consider PO Xanax 0.25 mg if pt. remains anxious  - IV lasix 20 mg x 1 dose  - Duoneb PRN  - Monitor VS  - Will endorse to day team in AM    Follow up with Attending in AM.    Brandan Rose PA-C  Department of Medicine  [facility] [id number]

## 2022-04-19 NOTE — PROVIDER CONTACT NOTE (CRITICAL VALUE NOTIFICATION) - SITUATION
Hgb: 7 Hct: 20.8
Hgb: 6.8 Hct: 20.3
hgb 6.9 hct 20.9
aPTT = 153.5
aPTT: >200
h/h: 7.0/20.0
hgb = 6.8  wbc = 0.91  hct = 20.5

## 2022-04-19 NOTE — PROGRESS NOTE ADULT - PROBLEM SELECTOR PLAN 2
CTA chest showing acute sub segmental pulmonary artery embolus in the right upper lobe.   On heparin gtt for AC.  4/16- f/u APTT and  platelet count  Q 6 hrs, if platelet count less than 40K transfuse platelets,  once >40  resume Heparin full A/C, following full AC nomogram.

## 2022-04-19 NOTE — PHYSICAL THERAPY INITIAL EVALUATION ADULT - GENERAL OBSERVATIONS, REHAB EVAL
Pt a/w AML, +PE, VA Duplex (-) for DVT in bilateral LE, PA Gage states PT can work with pt. Pt received semi-supine in bed in NAD, VSS, +LUE PICC, R IV, +tele with pulse ox, +2L O2 NC, A&Ox4, agreeable to PT eval.

## 2022-04-19 NOTE — PHYSICAL THERAPY INITIAL EVALUATION ADULT - PERTINENT HX OF CURRENT PROBLEM, REHAB EVAL
89F w/ T2DM, HTN who initially presented to the [facility] ED for palpitations x 3 days and weakness x 1 day. In [facility] ED, pt in a-fib w/ RVR HR 160s, saturating well on RA, BP hypertensive, RR 14. Labs significant for + with 88% blasts concerning for acute leukemia. Heme consulted in ED, patient transferred to MICU for urgent leukaphereses, s/p  CHIOMA bro

## 2022-04-19 NOTE — PROGRESS NOTE ADULT - ASSESSMENT
This is an 89F w/ T2DM, HTN who initially presented to the [facility] ED for palpitations x 3 days and weakness x 1 day. In [facility] ED, pt in a-fib w/ RVR HR 160s, saturating well on RA, BP hypertensive, RR 14. Labs significant for + with 88% blasts concerning for acute leukemia. 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

## 2022-04-19 NOTE — PHYSICAL THERAPY INITIAL EVALUATION ADULT - PRECAUTIONS/LIMITATIONS, REHAB EVAL
[continued from above]: and leukapheresis with improvement in counts, flow cytometry showed AML, bone marrow bx confirmed AML, on Dacogen, held Venetoclax on Day 2, (received one dose)   Patient has pancytopenia secondary to disease condition.CTA chest showing acute sub segmental pulmonary artery embolus in the right upper lobe./fall precautions

## 2022-04-19 NOTE — PROGRESS NOTE ADULT - NS ATTEND AMEND GEN_ALL_CORE FT
89 yo female with MHx sig for T2DM, HTN  here with newly diagnosed AML. She initially presented to PMD with severe weakness, unable to walk, WBC count > 200k. 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 Initial presentation complicated by tumor lysis syndrome, now s/p leukapheresis, rasburicase.  [facility] sent -- PENDING.  Plan for Decitabine + venetoclax (dose adjusted for posa), held venetoclax since 4/16, restarted 4/18/22  Day 4    - CT chest w contrast 4/12/22: Acute subsegmental pulmonary artery embolus in the right upper lobe. ?right heart strain, patchy bilateral upper lobe opacities may be infectious or inflammatory.  - VTE / PE: Likely exacerbated by severely elevated WBC, now on heparin drip, monitor coags / PTT closely,cont heparin, keep PTT around 60  tx plts to keep over 40 so that heparin can be maintained for at least two wks, US LE negative for DVT  - Infectious workup: No current infectious sx, while at [facility] on 4/12/22 Blood Cx, RVP, UA were negative  - TTE with normal LVEF, ? new Afib with episodes of RVR, now on verapamil, follow-up oral DOAC when plts consistently > 50k with treatment of AML  - TLS: Would increase IVF to 75 cc/hr given TLS, s/p rasburicase on 4/12/22  - Shiley for leukapheresis was removed  - Continue hydrea and monitor cbc with diff, LDH, uric acid twice daily  - Thrombocytopenia: Transfuse to keep Plt >10k or > 15k if febrile or > 50k if bleeding  - Anemia: Transfuse to maintain Hb > 7.0   - Neutropenic ppx: when ANC < 1000 start levaquin/ posa  - Micaela-anal wound stage 2 (developed at home), wound care  - Maintain good oral hygiene, OOB to chair  - PT eval. 89 yo female with MHx sig for T2DM, HTN  here with newly diagnosed AML. She initially presented to PMD with severe weakness, unable to walk, WBC count > 200k. 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 Initial presentation complicated by tumor lysis syndrome, now s/p leukapheresis, rasburicase.  [facility] sent -- PENDING.  Plan for Decitabine + venetoclax (dose adjusted for posa), held venetoclax since 4/16, restarted 4/18/22  Today is Day 5    - Feeling well, asymptomatic hypoglycemia to 60s  - CT chest w contrast 4/12/22: Acute subsegmental pulmonary artery embolus in the right upper lobe. ?right heart strain, patchy bilateral upper lobe opacities may be infectious or inflammatory.  - VTE / PE: Likely exacerbated by severely elevated WBC, now on heparin drip, monitor coags / PTT closely,cont heparin, keep PTT around 60  tx plts to keep over 40 so that heparin can be maintained for at least two wks up to 4/28/22, US LE negative for DVT  - TTE with normal LVEF, ? 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

## 2022-04-19 NOTE — PROVIDER CONTACT NOTE (CRITICAL VALUE NOTIFICATION) - NAME OF MD/NP/PA/DO NOTIFIED:
ROXANN Gipson
ROXANN Rose
Robert Canela NP
Robert Canela NP
ROXANN Pérez
Robert Canela NP
SARAH Ahmadi

## 2022-04-19 NOTE — ADVANCED PRACTICE NURSE CONSULT - ASSESSMENT
Patient is alert and responsive. Vital signs stable, afebrile. Chemo education given to both family and patient, well received. Patient has right forearm Angiocath 18G from 4/12/22, + blood return. Dressing is clean, dry, and intact. Labs reviewed by Dr. Ballard and team during morning rounds. Premedicated with Zofran 8 mg IVP. 2 RN verification completed prior to start. Day 1, Dacogen 20mg/m2 = 35 mg in NS running through NS in right forearm Angiocath 18G from 4/12/22 via alaris pump at 1238. Patient tolerating, left comfortably in bed. 
Patient is alert and responsive.Denies any discomfort at this time . Chemo education given to patient, well received. Patient has left upper arm double lumen PICC + blood return. Dressing is clean, dry, and intact. Site with no s/s of redness,swelling and pain. Labs reviewed by Dr. Young  and team during morning rounds. Pt. on  Zofran 8 mg IVP rtc . 2 RN verification completed prior to start. Day 2/5  Dacogen 20mg/m2 = 35 mg in NS attached  as a secondary to primary NS line to PICC  via alaris pump at 1028 . Patient tolerating, left comfortably in bed. 
Patient is alert and responsive.Denies any discomfort at this time . Chemo education given to patient, well received. Patient has left upper arm double lumen PICC + blood return. Dressing is clean, dry, and intact. Site with no s/s of redness,swelling and pain. Labs reviewed by Dr. Young  and team during morning rounds. Pt. on  Zofran 8 mg IVP rtc . 2 RN verification completed prior to start. Day 3/5  Dacogen 20mg/m2 = 35 mg in NS attached  as a secondary to primary NS line to PICC  via alaris pump at 1133 . Patient tolerating, left comfortably in bed. 
Patient is alert and responsive.Denies any discomfort at this time . Chemo education given to patient, well received. Patient has left upper arm double lumen PICC + blood return. Dressing is clean, dry, and intact. Site with no s/s of redness,swelling and pain. Labs reviewed by Dr. Young  and team during morning rounds. Pt. on  Zofran 8 mg IVP rtc . 2 RN verification completed prior to start. Day 4/5  Dacogen 20mg/m2 = 35 mg in NS attached  as a secondary to primary NS line to PICC  via alaris pump at 1135 . Patient tolerating, left comfortably in bed. 
Patient alert and responsive, vitals stable. Chemo education given, patient well received. Patient has left double lumen PICC that is clean, dry, and intact. No redness, swelling, or pain at site. + blood return noted. Labs reviewed by Dr. Young and team during morning rounding. Premedicated with daily Zofran 8mg IVP. 2 RN verification completed prior to start. Day 5/5, dacogen 35 mg in NS attached as secondary to NS through red port of left double lumen PICC via alaris pump at 1426. Primary RN aware of care, Patient tolerated well. Patient left safely in bed.

## 2022-04-19 NOTE — PROVIDER CONTACT NOTE (CRITICAL VALUE NOTIFICATION) - ACTION/TREATMENT ORDERED:
awaiting orders.
Followed nanogram
heparin gtt held, pt to receive 1 unit PLT
no new orders at this time
repeat H/H

## 2022-04-19 NOTE — PROVIDER CONTACT NOTE (CRITICAL VALUE NOTIFICATION) - TEST AND RESULT REPORTED:
h/h: 7.0/20.0
Hgb: 7 Hct: 20.8
hgb 6.9 hct 20.9
aPTT = 153.5
aPTT: >200
Hgb: 6.8 Hct: 20.3
hgb = 6.8  wbc = 0.91  hct = 20.5

## 2022-04-19 NOTE — PHYSICAL THERAPY INITIAL EVALUATION ADULT - MANUAL MUSCLE TESTING RESULTS, REHAB EVAL
UE all grossly assessed to be at least 3/5, LE all grossly assessed to be at least 3/5, except ankle DF 2/5, PF 3/5/grossly assessed due to

## 2022-04-20 NOTE — CHART NOTE - NSCHARTNOTEFT_GEN_A_CORE
MICU Accept Note    CHIEF COMPLAINT:     HPI / INTERVAL HISTORY:    PAST MEDICAL & SURGICAL HISTORY:  H/O: HTN (hypertension)    Diabetes mellitus    Chronic atrial fibrillation    H/O: hysterectomy  indication: uterine cancer        FAMILY HISTORY:  No pertinent family history in first degree relatives        SOCIAL HISTORY:  Smoking: [  ] Never Smoked  [  ] Former Smoker (# packs x # years)  [  ] Current Smoker (# packs x # years)  Substance Use:   EtOH Use:   Marital Status: [  ] Single  [  ]   [  ]   [  ]   Sexual History:   Occupation:  Recent Travel:  Country of Birth:   Advance Directives:     HOME MEDICATIONS:      Allergies    No Known Allergies    Intolerances          REVIEW OF SYSTEMS:  Constitutional: No fevers, chills, weight loss, weight gain  HEENT: No vision problems, eye pain, nasal congestion, rhinorrhea, sore throat, dysphagia  CV: No chest pain, orthopnea, palpitations  Resp: No cough, dyspnea, wheezing, hemoptysis  GI: No nausea, vomiting, diarrhea, constipation, abdominal pain  : [ ] dysuria [ ] nocturia [ ] hematuria [ ] increased urinary frequency  Musculoskeletal: [ ] back pain [ ] myalgias [ ] arthralgias [ ] fracture  Skin: [ ] rash [ ] itch  Neurological: [ ] headache [ ] dizziness [ ] syncope [ ] weakness [ ] numbness  Psychiatric: [ ] anxiety [ ] depression  Endocrine: [ ] diabetes [ ] thyroid problem  Hematologic/Lymphatic: [ ] anemia [ ] bleeding problem  Allergic/Immunologic: [ ] itchy eyes [ ] nasal discharge [ ] hives [ ] angioedema  [ ] All other systems negative  [ ] Unable to assess ROS because ________    OBJECTIVE:  ICU Vital Signs Last 24 Hrs  T(C): 37.4 (20 Apr 2022 09:15), Max: 37.4 (20 Apr 2022 09:15)  T(F): 99.3 (20 Apr 2022 09:15), Max: 99.3 (20 Apr 2022 09:15)  HR: 69 (20 Apr 2022 09:36) (60 - 90)  BP: 110/73 (20 Apr 2022 09:15) (100/66 - 173/87)  BP(mean): --  ABP: --  ABP(mean): --  RR: 18 (20 Apr 2022 09:36) (16 - 20)  SpO2: 100% (20 Apr 2022 09:36) (90% - 100%)        04-19 @ 07:01 - 04-20 @ 07:00  --------------------------------------------------------  IN: 1742 mL / OUT: 1501 mL / NET: 241 mL    04-20 @ 07:01 - 04-20 @ 12:54  --------------------------------------------------------  IN: 225 mL / OUT: 0 mL / NET: 225 mL      CAPILLARY BLOOD GLUCOSE      POCT Blood Glucose.: 95 mg/dL (20 Apr 2022 12:17)      PHYSICAL EXAM:  General:   HEENT:   Neck:   Chest/Lungs:  Heart:  Abdomen:   Extremities:   Skin:   Neuro:   Psych:     LINES:     HOSPITAL MEDICATIONS:  MEDICATIONS  (STANDING):  allopurinol 300 milliGRAM(s) Oral daily  benzonatate 100 milliGRAM(s) Oral every 8 hours  Biotene Dry Mouth Oral Rinse 15 milliLiter(s) Swish and Spit three times a day  chlorhexidine 4% Liquid 1 Application(s) Topical <User Schedule>  dextrose 5%. 1000 milliLiter(s) (50 mL/Hr) IV Continuous <Continuous>  dextrose 5%. 1000 milliLiter(s) (100 mL/Hr) IV Continuous <Continuous>  dextrose 50% Injectable 25 Gram(s) IV Push once  dextrose 50% Injectable 12.5 Gram(s) IV Push once  dextrose 50% Injectable 25 Gram(s) IV Push once  famotidine    Tablet 20 milliGRAM(s) Oral daily  furosemide   Injectable 40 milliGRAM(s) IV Push once  glucagon  Injectable 1 milliGRAM(s) IntraMuscular once  insulin glargine Injectable (LANTUS) 10 Unit(s) SubCutaneous at bedtime  insulin lispro (ADMELOG) corrective regimen sliding scale   SubCutaneous three times a day before meals  insulin lispro (ADMELOG) corrective regimen sliding scale   SubCutaneous at bedtime  insulin lispro Injectable (ADMELOG) 2 Unit(s) SubCutaneous three times a day before meals  levoFLOXacin  Tablet 250 milliGRAM(s) Oral every 24 hours  metoprolol tartrate 50 milliGRAM(s) Oral two times a day  phytonadione   Solution 5 milliGRAM(s) Oral daily  posaconazole DR Tablet 300 milliGRAM(s) Oral daily  sodium chloride 0.9%. 1000 milliLiter(s) (25 mL/Hr) IV Continuous <Continuous>  venetoclax 100 milliGRAM(s) Oral <User Schedule>  verapamil  milliGRAM(s) Oral daily    MEDICATIONS  (PRN):  acetaminophen     Tablet .. 650 milliGRAM(s) Oral every 6 hours PRN Temp greater or equal to 38C (100.4F), Mild Pain (1 - 3)  aluminum hydroxide/magnesium hydroxide/simethicone Suspension 30 milliLiter(s) Oral every 4 hours PRN Dyspepsia  dextrose Oral Gel 15 Gram(s) Oral once PRN Blood Glucose LESS THAN 70 milliGRAM(s)/deciliter      LABS:                        7.7    1.04  )-----------( 95       ( 20 Apr 2022 11:02 )             23.7     Hgb Trend: 7.7<--, 7.9<--, 9.2<--, 8.7<--, 8.1<--  04-20    136  |  103  |  19  ----------------------------<  143<H>  4.2   |  23  |  0.77    Ca    7.1<L>      20 Apr 2022 06:56  Phos  2.8     04-20  Mg     1.9     04-20    TPro  6.0  /  Alb  2.8<L>  /  TBili  0.8  /  DBili  x   /  AST  33  /  ALT  28  /  AlkPhos  63  04-20    Creatinine Trend: 0.77<--, 0.78<--, 0.73<--, 0.78<--, 0.74<--, 0.73<--  PT/INR - ( 19 Apr 2022 13:57 )   PT: 15.3 sec;   INR: 1.32 ratio         PTT - ( 20 Apr 2022 03:26 )  PTT:74.3 sec      Venous Blood Gas:  04-20 @ 00:45  7.31/37/31/19/51.3  VBG Lactate: 2.3      MICROBIOLOGY:     RADIOLOGY & ADDITIONAL TESTS:        Minerva Jean MD  Internal Medicine PGY1  Pager: [phone] ([facility]), [phone] ([facility]) MICU Accept Note    CHIEF COMPLAINT: post cardiac arrest    HPI / INTERVAL HISTORY:  89F w/ T2DM, HTN who initially presented to the [facility] ED for palpitations x 3 days and weakness x 1 day. She felt very weak and lightheaded while standing today, feeling like she would pass out so this prompted her to come to the ED. Recently she also had a cough x 3 weeks. She was seen by PMD who felt this was likely a URI. Pt continued to have cough with weakness and HR in 140s. Pt denies any fever, chills, SOB, chest pain, or and pain. She did not take her medications this morning because she was feeling unwell. She also does not take her metformin regularly.      In [facility] ED, pt in a-fib w/ RVR HR 160s, saturating well on RA, BP hypertensive, RR 14. Labs significant for + with 88% blasts concerning for acute leukemia. Heme consulted in ED, patient transferred to MICU for urgent leukaphereses. Received RIJ shiley and leukapheresis with improvement in counts. Flow cytometry showed AML. Bone marrow bx done on 4/13. Symptoms improved, transferred to medicine oncology floor for further management.    Given tretinoin 40mg q12h and dexamethasone 10mg q12 4/13-4/14. BM Bx sent 4/13. Given 1unit platlet for thrombocytopenia to 38. Started on decitabine and venetoclax 4/15, held 4/16, and restarted 4/18.    RRT on 4/19 ovn for respiratory distress, chest x ray showing congestion, placed on bipap and given diuretics with improvement. Of note, patient has a subsegmental pulmonary artery embolus in RUL, was on heparin gtt for AC but dc'ed due to thrombocytopenia. Concern for TLs, received rasburicase.    Cardiac arrested on floors, admitted for MICU for further management.      PAST MEDICAL & SURGICAL HISTORY:  H/O: HTN (hypertension)    Diabetes mellitus    Chronic atrial fibrillation    H/O: hysterectomy  indication: uterine cancer        FAMILY HISTORY:  No pertinent family history in first degree relatives        SOCIAL HISTORY:  Smoking: [  ] Never Smoked  [  ] Former Smoker (# packs x # years)  [  ] Current Smoker (# packs x # years)  Substance Use:   EtOH Use:   Marital Status: [  ] Single  [  ]   [  ]   [  ]   Sexual History:   Occupation:  Recent Travel:  Country of Birth:   Advance Directives:     HOME MEDICATIONS:      Allergies    No Known Allergies    Intolerances          REVIEW OF SYSTEMS:  Constitutional: No fevers, chills, weight loss, weight gain  HEENT: No vision problems, eye pain, nasal congestion, rhinorrhea, sore throat, dysphagia  CV: No chest pain, orthopnea, palpitations  Resp: No cough, dyspnea, wheezing, hemoptysis  GI: No nausea, vomiting, diarrhea, constipation, abdominal pain  : [ ] dysuria [ ] nocturia [ ] hematuria [ ] increased urinary frequency  Musculoskeletal: [ ] back pain [ ] myalgias [ ] arthralgias [ ] fracture  Skin: [ ] rash [ ] itch  Neurological: [ ] headache [ ] dizziness [ ] syncope [ ] weakness [ ] numbness  Psychiatric: [ ] anxiety [ ] depression  Endocrine: [ ] diabetes [ ] thyroid problem  Hematologic/Lymphatic: [ ] anemia [ ] bleeding problem  Allergic/Immunologic: [ ] itchy eyes [ ] nasal discharge [ ] hives [ ] angioedema  [ ] All other systems negative  [ ] Unable to assess ROS because ________    OBJECTIVE:  ICU Vital Signs Last 24 Hrs  T(C): 37.4 (20 Apr 2022 09:15), Max: 37.4 (20 Apr 2022 09:15)  T(F): 99.3 (20 Apr 2022 09:15), Max: 99.3 (20 Apr 2022 09:15)  HR: 69 (20 Apr 2022 09:36) (60 - 90)  BP: 110/73 (20 Apr 2022 09:15) (100/66 - 173/87)  BP(mean): --  ABP: --  ABP(mean): --  RR: 18 (20 Apr 2022 09:36) (16 - 20)  SpO2: 100% (20 Apr 2022 09:36) (90% - 100%)        04-19 @ 07:01  -  04-20 @ 07:00  --------------------------------------------------------  IN: 1742 mL / OUT: 1501 mL / NET: 241 mL    04-20 @ 07:01  -  04-20 @ 12:54  --------------------------------------------------------  IN: 225 mL / OUT: 0 mL / NET: 225 mL      CAPILLARY BLOOD GLUCOSE      POCT Blood Glucose.: 95 mg/dL (20 Apr 2022 12:17)      PHYSICAL EXAM:  General:   HEENT:   Neck:   Chest/Lungs:  Heart:  Abdomen:   Extremities:   Skin:   Neuro:   Psych:     LINES:     HOSPITAL MEDICATIONS:  MEDICATIONS  (STANDING):  allopurinol 300 milliGRAM(s) Oral daily  benzonatate 100 milliGRAM(s) Oral every 8 hours  Biotene Dry Mouth Oral Rinse 15 milliLiter(s) Swish and Spit three times a day  chlorhexidine 4% Liquid 1 Application(s) Topical <User Schedule>  dextrose 5%. 1000 milliLiter(s) (50 mL/Hr) IV Continuous <Continuous>  dextrose 5%. 1000 milliLiter(s) (100 mL/Hr) IV Continuous <Continuous>  dextrose 50% Injectable 25 Gram(s) IV Push once  dextrose 50% Injectable 12.5 Gram(s) IV Push once  dextrose 50% Injectable 25 Gram(s) IV Push once  famotidine    Tablet 20 milliGRAM(s) Oral daily  furosemide   Injectable 40 milliGRAM(s) IV Push once  glucagon  Injectable 1 milliGRAM(s) IntraMuscular once  insulin glargine Injectable (LANTUS) 10 Unit(s) SubCutaneous at bedtime  insulin lispro (ADMELOG) corrective regimen sliding scale   SubCutaneous three times a day before meals  insulin lispro (ADMELOG) corrective regimen sliding scale   SubCutaneous at bedtime  insulin lispro Injectable (ADMELOG) 2 Unit(s) SubCutaneous three times a day before meals  levoFLOXacin  Tablet 250 milliGRAM(s) Oral every 24 hours  metoprolol tartrate 50 milliGRAM(s) Oral two times a day  phytonadione   Solution 5 milliGRAM(s) Oral daily  posaconazole DR Tablet 300 milliGRAM(s) Oral daily  sodium chloride 0.9%. 1000 milliLiter(s) (25 mL/Hr) IV Continuous <Continuous>  venetoclax 100 milliGRAM(s) Oral <User Schedule>  verapamil  milliGRAM(s) Oral daily    MEDICATIONS  (PRN):  acetaminophen     Tablet .. 650 milliGRAM(s) Oral every 6 hours PRN Temp greater or equal to 38C (100.4F), Mild Pain (1 - 3)  aluminum hydroxide/magnesium hydroxide/simethicone Suspension 30 milliLiter(s) Oral every 4 hours PRN Dyspepsia  dextrose Oral Gel 15 Gram(s) Oral once PRN Blood Glucose LESS THAN 70 milliGRAM(s)/deciliter      LABS:                        7.7    1.04  )-----------( 95       ( 20 Apr 2022 11:02 )             23.7     Hgb Trend: 7.7<--, 7.9<--, 9.2<--, 8.7<--, 8.1<--  04-20    136  |  103  |  19  ----------------------------<  143<H>  4.2   |  23  |  0.77    Ca    7.1<L>      20 Apr 2022 06:56  Phos  2.8     04-20  Mg     1.9     04-20    TPro  6.0  /  Alb  2.8<L>  /  TBili  0.8  /  DBili  x   /  AST  33  /  ALT  28  /  AlkPhos  63  04-20    Creatinine Trend: 0.77<--, 0.78<--, 0.73<--, 0.78<--, 0.74<--, 0.73<--  PT/INR - ( 19 Apr 2022 13:57 )   PT: 15.3 sec;   INR: 1.32 ratio         PTT - ( 20 Apr 2022 03:26 )  PTT:74.3 sec      Venous Blood Gas:  04-20 @ 00:45  7.31/37/31/19/51.3  VBG Lactate: 2.3      RADIOLOGY & ADDITIONAL TESTS: reviewed    Assessment and Plan:  89F w/ T2DM, HTN who initially presented to the [facility] ED for palpitations x 3 days and weakness x 1 day., found to be in a-fib w/ RVR. Labs significant for + with 88% blasts concerning for acute leukemia, admitted to MICU for urgent leukaphereses, s/p  CHIOMA bro and leukapheresis with improvement in counts, flow cytometry showed AML, bone marrow bx confirmed AML. Admitted to MICU s/p ROSC after cardiac arrest. MICU Accept Note    CHIEF COMPLAINT: post cardiac arrest    HPI / INTERVAL HISTORY:  89F w/ T2DM, HTN who initially presented to the [facility] ED for palpitations x 3 days and weakness x 1 day. She felt very weak and lightheaded while standing today, feeling like she would pass out so this prompted her to come to the ED. Recently she also had a cough x 3 weeks. She was seen by PMD who felt this was likely a URI. Pt continued to have cough with weakness and HR in 140s. Pt denies any fever, chills, SOB, chest pain, or and pain. She did not take her medications this morning because she was feeling unwell. She also does not take her metformin regularly.      In [facility] ED, pt in a-fib w/ RVR HR 160s, saturating well on RA, BP hypertensive, RR 14. Labs significant for + with 88% blasts concerning for acute leukemia. Heme consulted in ED, patient transferred to MICU for urgent leukaphereses. Received RIJ shiley and leukapheresis with improvement in counts. Flow cytometry showed AML. Bone marrow bx done on 4/13. Symptoms improved, transferred to medicine oncology floor for further management.    Given tretinoin 40mg q12h and dexamethasone 10mg q12 4/13-4/14. BM Bx sent 4/13. Given 1unit platlet for thrombocytopenia to 38. Started on decitabine and venetoclax 4/15, held 4/16, and restarted 4/18.    RRT on 4/19 ovn for respiratory distress, chest x ray showing congestion, placed on bipap and given diuretics with improvement. Of note, patient has a subsegmental pulmonary artery embolus in RUL, was on heparin gtt for AC but dc'ed due to thrombocytopenia. Concern for TLs, received rasburicase.    Cardiac arrested on floors, admitted for MICU for further management.      PAST MEDICAL & SURGICAL HISTORY:  H/O: HTN (hypertension)    Diabetes mellitus    Chronic atrial fibrillation    H/O: hysterectomy  indication: uterine cancer        FAMILY HISTORY:  No pertinent family history in first degree relatives        SOCIAL HISTORY:  Smoking: [  ] Never Smoked  [  ] Former Smoker (# packs x # years)  [  ] Current Smoker (# packs x # years)  Substance Use:   EtOH Use:   Marital Status: [  ] Single  [  ]   [  ]   [  ]   Sexual History:   Occupation:  Recent Travel:  Country of Birth:   Advance Directives:     HOME MEDICATIONS:      Allergies    No Known Allergies    Intolerances    Unable to perform ROS due to mental status     OBJECTIVE:  ICU Vital Signs Last 24 Hrs  T(C): 37.4 (20 Apr 2022 09:15), Max: 37.4 (20 Apr 2022 09:15)  T(F): 99.3 (20 Apr 2022 09:15), Max: 99.3 (20 Apr 2022 09:15)  HR: 69 (20 Apr 2022 09:36) (60 - 90)  BP: 110/73 (20 Apr 2022 09:15) (100/66 - 173/87)  BP(mean): --  ABP: --  ABP(mean): --  RR: 18 (20 Apr 2022 09:36) (16 - 20)  SpO2: 100% (20 Apr 2022 09:36) (90% - 100%)        04-19 @ 07:01  -  04-20 @ 07:00  --------------------------------------------------------  IN: 1742 mL / OUT: 1501 mL / NET: 241 mL    04-20 @ 07:01 - 04-20 @ 12:54  --------------------------------------------------------  IN: 225 mL / OUT: 0 mL / NET: 225 mL      CAPILLARY BLOOD GLUCOSE      POCT Blood Glucose.: 95 mg/dL (20 Apr 2022 12:17)      PHYSICAL EXAM:  General: Intubated, tracking  HEENT: No icterus or conjunctival injection   Neck: Supple   Chest/Lungs: Bilateral breath sounds with scant rales, no wheezing or rhonchi  Heart: Normal s1 and s2 w/o murmur, rub or gallop   Abdomen: NT, ND, Soft, No hepatomegaly   Extremities: Cool distally, Trace edema, No cyanosis   Skin: Cool, dry, No obvious rashes   Neuro: Alert and Oriented x0, non-verbal, eyes opening spontaneously   Psych: Unable to be assessed     LINES:     HOSPITAL MEDICATIONS:  MEDICATIONS  (STANDING):  allopurinol 300 milliGRAM(s) Oral daily  benzonatate 100 milliGRAM(s) Oral every 8 hours  Biotene Dry Mouth Oral Rinse 15 milliLiter(s) Swish and Spit three times a day  chlorhexidine 4% Liquid 1 Application(s) Topical <User Schedule>  dextrose 5%. 1000 milliLiter(s) (50 mL/Hr) IV Continuous <Continuous>  dextrose 5%. 1000 milliLiter(s) (100 mL/Hr) IV Continuous <Continuous>  dextrose 50% Injectable 25 Gram(s) IV Push once  dextrose 50% Injectable 12.5 Gram(s) IV Push once  dextrose 50% Injectable 25 Gram(s) IV Push once  famotidine    Tablet 20 milliGRAM(s) Oral daily  furosemide   Injectable 40 milliGRAM(s) IV Push once  glucagon  Injectable 1 milliGRAM(s) IntraMuscular once  insulin glargine Injectable (LANTUS) 10 Unit(s) SubCutaneous at bedtime  insulin lispro (ADMELOG) corrective regimen sliding scale   SubCutaneous three times a day before meals  insulin lispro (ADMELOG) corrective regimen sliding scale   SubCutaneous at bedtime  insulin lispro Injectable (ADMELOG) 2 Unit(s) SubCutaneous three times a day before meals  levoFLOXacin  Tablet 250 milliGRAM(s) Oral every 24 hours  metoprolol tartrate 50 milliGRAM(s) Oral two times a day  phytonadione   Solution 5 milliGRAM(s) Oral daily  posaconazole DR Tablet 300 milliGRAM(s) Oral daily  sodium chloride 0.9%. 1000 milliLiter(s) (25 mL/Hr) IV Continuous <Continuous>  venetoclax 100 milliGRAM(s) Oral <User Schedule>  verapamil  milliGRAM(s) Oral daily    MEDICATIONS  (PRN):  acetaminophen     Tablet .. 650 milliGRAM(s) Oral every 6 hours PRN Temp greater or equal to 38C (100.4F), Mild Pain (1 - 3)  aluminum hydroxide/magnesium hydroxide/simethicone Suspension 30 milliLiter(s) Oral every 4 hours PRN Dyspepsia  dextrose Oral Gel 15 Gram(s) Oral once PRN Blood Glucose LESS THAN 70 milliGRAM(s)/deciliter      LABS:                        7.7    1.04  )-----------( 95       ( 20 Apr 2022 11:02 )             23.7     Hgb Trend: 7.7<--, 7.9<--, 9.2<--, 8.7<--, 8.1<--  04-20    136  |  103  |  19  ----------------------------<  143<H>  4.2   |  23  |  0.77    Ca    7.1<L>      20 Apr 2022 06:56  Phos  2.8     04-20  Mg     1.9     04-20    TPro  6.0  /  Alb  2.8<L>  /  TBili  0.8  /  DBili  x   /  AST  33  /  ALT  28  /  AlkPhos  63  04-20    Creatinine Trend: 0.77<--, 0.78<--, 0.73<--, 0.78<--, 0.74<--, 0.73<--  PT/INR - ( 19 Apr 2022 13:57 )   PT: 15.3 sec;   INR: 1.32 ratio         PTT - ( 20 Apr 2022 03:26 )  PTT:74.3 sec      Venous Blood Gas:  04-20 @ 00:45  7.31/37/31/19/51.3  VBG Lactate: 2.3      RADIOLOGY & ADDITIONAL TESTS: reviewed    Assessment and Plan:  89F w/ T2DM, HTN who initially presented to the [facility] ED for palpitations x 3 days and weakness x 1 day., found to be in a-fib w/ RVR. Labs significant for + with 88% blasts concerning for acute leukemia, admitted to MICU for urgent leukaphereses, s/p  CHIOMA bro and leukapheresis with improvement in counts, flow cytometry showed AML, bone marrow bx confirmed AML. Admitted to MICU s/p ROSC after cardiac arrest. MICU Accept Note    CHIEF COMPLAINT: post cardiac arrest    HPI / INTERVAL HISTORY:  89F w/ T2DM, HTN who initially presented to the [facility] ED for palpitations x 3 days and weakness x 1 day. She felt very weak and lightheaded while standing today, feeling like she would pass out so this prompted her to come to the ED. Recently she also had a cough x 3 weeks. She was seen by PMD who felt this was likely a URI. Pt continued to have cough with weakness and HR in 140s. Pt denies any fever, chills, SOB, chest pain, or and pain. She did not take her medications this morning because she was feeling unwell. She also does not take her metformin regularly.      In [facility] ED, pt in a-fib w/ RVR HR 160s, saturating well on RA, BP hypertensive, RR 14. Labs significant for + with 88% blasts concerning for acute leukemia. Heme consulted in ED, patient transferred to MICU for urgent leukaphereses. Received RIJ shiley and leukapheresis with improvement in counts. Flow cytometry showed AML. Bone marrow bx done on 4/13. Symptoms improved, transferred to medicine oncology floor for further management.    Given tretinoin 40mg q12h and dexamethasone 10mg q12 4/13-4/14. BM Bx sent 4/13. Given 1unit platlet for thrombocytopenia to 38. Started on decitabine and venetoclax 4/15, held 4/16, and restarted 4/18.    RRT on 4/19 ovn for respiratory distress, chest x ray showing congestion, placed on bipap and given diuretics with improvement. Of note, patient has a subsegmental pulmonary artery embolus in RUL, was on heparin gtt for AC but dc'ed due to thrombocytopenia. Concern for TLs, received rasburicase.    Cardiac arrested on floors, admitted for MICU for further management.      PAST MEDICAL & SURGICAL HISTORY:  H/O: HTN (hypertension)    Diabetes mellitus    Chronic atrial fibrillation    H/O: hysterectomy  indication: uterine cancer        FAMILY HISTORY:  No pertinent family history in first degree relatives        SOCIAL HISTORY:  Smoking: [  ] Never Smoked  [  ] Former Smoker (# packs x # years)  [  ] Current Smoker (# packs x # years)  Substance Use:   EtOH Use:   Marital Status: [  ] Single  [  ]   [  ]   [  ]   Sexual History:   Occupation:  Recent Travel:  Country of Birth:   Advance Directives:     HOME MEDICATIONS:      Allergies    No Known Allergies    Intolerances    Unable to perform ROS due to mental status     OBJECTIVE:  ICU Vital Signs Last 24 Hrs  T(C): 37.4 (20 Apr 2022 09:15), Max: 37.4 (20 Apr 2022 09:15)  T(F): 99.3 (20 Apr 2022 09:15), Max: 99.3 (20 Apr 2022 09:15)  HR: 69 (20 Apr 2022 09:36) (60 - 90)  BP: 110/73 (20 Apr 2022 09:15) (100/66 - 173/87)  BP(mean): --  ABP: --  ABP(mean): --  RR: 18 (20 Apr 2022 09:36) (16 - 20)  SpO2: 100% (20 Apr 2022 09:36) (90% - 100%)        04-19 @ 07:01  -  04-20 @ 07:00  --------------------------------------------------------  IN: 1742 mL / OUT: 1501 mL / NET: 241 mL    04-20 @ 07:01 - 04-20 @ 12:54  --------------------------------------------------------  IN: 225 mL / OUT: 0 mL / NET: 225 mL      CAPILLARY BLOOD GLUCOSE      POCT Blood Glucose.: 95 mg/dL (20 Apr 2022 12:17)      PHYSICAL EXAM:  General: Intubated, tracking  HEENT: No icterus or conjunctival injection   Neck: Supple   Chest/Lungs: Bilateral breath sounds with scant rales, no wheezing or rhonchi  Heart: Normal s1 and s2 w/o murmur, rub or gallop   Abdomen: NT, ND, Soft, No hepatomegaly   Extremities: Cool distally, Trace edema, No cyanosis   Skin: Cool, dry, No obvious rashes   Neuro: Alert and Oriented x0, non-verbal, eyes opening spontaneously   Psych: Unable to be assessed     LINES:     HOSPITAL MEDICATIONS:  MEDICATIONS  (STANDING):  allopurinol 300 milliGRAM(s) Oral daily  benzonatate 100 milliGRAM(s) Oral every 8 hours  Biotene Dry Mouth Oral Rinse 15 milliLiter(s) Swish and Spit three times a day  chlorhexidine 4% Liquid 1 Application(s) Topical <User Schedule>  dextrose 5%. 1000 milliLiter(s) (50 mL/Hr) IV Continuous <Continuous>  dextrose 5%. 1000 milliLiter(s) (100 mL/Hr) IV Continuous <Continuous>  dextrose 50% Injectable 25 Gram(s) IV Push once  dextrose 50% Injectable 12.5 Gram(s) IV Push once  dextrose 50% Injectable 25 Gram(s) IV Push once  famotidine    Tablet 20 milliGRAM(s) Oral daily  furosemide   Injectable 40 milliGRAM(s) IV Push once  glucagon  Injectable 1 milliGRAM(s) IntraMuscular once  insulin glargine Injectable (LANTUS) 10 Unit(s) SubCutaneous at bedtime  insulin lispro (ADMELOG) corrective regimen sliding scale   SubCutaneous three times a day before meals  insulin lispro (ADMELOG) corrective regimen sliding scale   SubCutaneous at bedtime  insulin lispro Injectable (ADMELOG) 2 Unit(s) SubCutaneous three times a day before meals  levoFLOXacin  Tablet 250 milliGRAM(s) Oral every 24 hours  metoprolol tartrate 50 milliGRAM(s) Oral two times a day  phytonadione   Solution 5 milliGRAM(s) Oral daily  posaconazole DR Tablet 300 milliGRAM(s) Oral daily  sodium chloride 0.9%. 1000 milliLiter(s) (25 mL/Hr) IV Continuous <Continuous>  venetoclax 100 milliGRAM(s) Oral <User Schedule>  verapamil  milliGRAM(s) Oral daily    MEDICATIONS  (PRN):  acetaminophen     Tablet .. 650 milliGRAM(s) Oral every 6 hours PRN Temp greater or equal to 38C (100.4F), Mild Pain (1 - 3)  aluminum hydroxide/magnesium hydroxide/simethicone Suspension 30 milliLiter(s) Oral every 4 hours PRN Dyspepsia  dextrose Oral Gel 15 Gram(s) Oral once PRN Blood Glucose LESS THAN 70 milliGRAM(s)/deciliter      LABS:                        7.7    1.04  )-----------( 95       ( 20 Apr 2022 11:02 )             23.7     Hgb Trend: 7.7<--, 7.9<--, 9.2<--, 8.7<--, 8.1<--  04-20    136  |  103  |  19  ----------------------------<  143<H>  4.2   |  23  |  0.77    Ca    7.1<L>      20 Apr 2022 06:56  Phos  2.8     04-20  Mg     1.9     04-20    TPro  6.0  /  Alb  2.8<L>  /  TBili  0.8  /  DBili  x   /  AST  33  /  ALT  28  /  AlkPhos  63  04-20    Creatinine Trend: 0.77<--, 0.78<--, 0.73<--, 0.78<--, 0.74<--, 0.73<--  PT/INR - ( 19 Apr 2022 13:57 )   PT: 15.3 sec;   INR: 1.32 ratio         PTT - ( 20 Apr 2022 03:26 )  PTT:74.3 sec      Venous Blood Gas:  04-20 @ 00:45  7.31/37/31/19/51.3  VBG Lactate: 2.3      RADIOLOGY & ADDITIONAL TESTS: reviewed    Assessment and Plan:  89F w/ T2DM, HTN who initially presented to the [facility] ED for palpitations x 3 days and weakness x 1 day., found to be in a-fib w/ RVR. Labs significant for + with 88% blasts concerning for acute leukemia, admitted to MICU for urgent leukaphereses, s/p  CHIOMA bro and leukapheresis with improvement in counts, flow cytometry showed AML, bone marrow bx confirmed AML. Admitted to MICU s/p ROSC after cardiac arrest.    Neuro  -Intubated and sedated    CV    Resp    GI    /renal    ID    Endo    Heme/Onc    Ethics  -Full code MICU Accept Note    CHIEF COMPLAINT: post cardiac arrest    HPI / INTERVAL HISTORY:  89F w/ T2DM, HTN who initially presented to the [facility] ED for palpitations x 3 days and weakness x 1 day. She felt very weak and lightheaded while standing today, feeling like she would pass out so this prompted her to come to the ED. Recently she also had a cough x 3 weeks. She was seen by PMD who felt this was likely a URI. Pt continued to have cough with weakness and HR in 140s. Pt denies any fever, chills, SOB, chest pain, or and pain. She did not take her medications this morning because she was feeling unwell. She also does not take her metformin regularly.      In [facility] ED, pt in a-fib w/ RVR HR 160s, saturating well on RA, BP hypertensive, RR 14. Labs significant for + with 88% blasts concerning for acute leukemia. Heme consulted in ED, patient transferred to MICU for urgent leukaphereses. Received RIJ shiley and leukapheresis with improvement in counts. Flow cytometry showed AML. Bone marrow bx done on 4/13. Symptoms improved, transferred to medicine oncology floor for further management.    Given tretinoin 40mg q12h and dexamethasone 10mg q12 4/13-4/14. BM Bx sent 4/13. Given 1unit platlet for thrombocytopenia to 38. Started on decitabine and venetoclax 4/15, held 4/16, and restarted 4/18.    RRT on 4/19 ovn for respiratory distress, chest x ray showing congestion, placed on bipap and given diuretics with improvement. Of note, patient has a subsegmental pulmonary artery embolus in RUL, was on heparin gtt for AC but dc'ed due to thrombocytopenia. Concern for TLs, received rasburicase.    Cardiac arrested on floors, admitted for MICU for further management.      PAST MEDICAL & SURGICAL HISTORY:  H/O: HTN (hypertension)    Diabetes mellitus    Chronic atrial fibrillation    H/O: hysterectomy  indication: uterine cancer        FAMILY HISTORY:  No pertinent family history in first degree relatives        SOCIAL HISTORY:  Smoking: [  ] Never Smoked  [  ] Former Smoker (# packs x # years)  [  ] Current Smoker (# packs x # years)  Substance Use:   EtOH Use:   Marital Status: [  ] Single  [  ]   [  ]   [  ]   Sexual History:   Occupation:  Recent Travel:  Country of Birth:   Advance Directives:     HOME MEDICATIONS:      Allergies    No Known Allergies    Intolerances    Unable to perform ROS due to mental status     OBJECTIVE:  ICU Vital Signs Last 24 Hrs  T(C): 37.4 (20 Apr 2022 09:15), Max: 37.4 (20 Apr 2022 09:15)  T(F): 99.3 (20 Apr 2022 09:15), Max: 99.3 (20 Apr 2022 09:15)  HR: 69 (20 Apr 2022 09:36) (60 - 90)  BP: 110/73 (20 Apr 2022 09:15) (100/66 - 173/87)  BP(mean): --  ABP: --  ABP(mean): --  RR: 18 (20 Apr 2022 09:36) (16 - 20)  SpO2: 100% (20 Apr 2022 09:36) (90% - 100%)        04-19 @ 07:01  -  04-20 @ 07:00  --------------------------------------------------------  IN: 1742 mL / OUT: 1501 mL / NET: 241 mL    04-20 @ 07:01 - 04-20 @ 12:54  --------------------------------------------------------  IN: 225 mL / OUT: 0 mL / NET: 225 mL      CAPILLARY BLOOD GLUCOSE      POCT Blood Glucose.: 95 mg/dL (20 Apr 2022 12:17)      PHYSICAL EXAM:  General: Intubated, tracking  HEENT: No icterus or conjunctival injection   Neck: Supple   Chest/Lungs: Bilateral breath sounds with scant rales, no wheezing or rhonchi  Heart: Normal s1 and s2 w/o murmur, rub or gallop   Abdomen: NT, ND, Soft, No hepatomegaly   Extremities: Cool distally, Trace edema, No cyanosis   Skin: Cool, dry, No obvious rashes   Neuro: Alert and Oriented x0, non-verbal, eyes opening spontaneously   Psych: Unable to be assessed     LINES:     HOSPITAL MEDICATIONS:  MEDICATIONS  (STANDING):  allopurinol 300 milliGRAM(s) Oral daily  benzonatate 100 milliGRAM(s) Oral every 8 hours  Biotene Dry Mouth Oral Rinse 15 milliLiter(s) Swish and Spit three times a day  chlorhexidine 4% Liquid 1 Application(s) Topical <User Schedule>  dextrose 5%. 1000 milliLiter(s) (50 mL/Hr) IV Continuous <Continuous>  dextrose 5%. 1000 milliLiter(s) (100 mL/Hr) IV Continuous <Continuous>  dextrose 50% Injectable 25 Gram(s) IV Push once  dextrose 50% Injectable 12.5 Gram(s) IV Push once  dextrose 50% Injectable 25 Gram(s) IV Push once  famotidine    Tablet 20 milliGRAM(s) Oral daily  furosemide   Injectable 40 milliGRAM(s) IV Push once  glucagon  Injectable 1 milliGRAM(s) IntraMuscular once  insulin glargine Injectable (LANTUS) 10 Unit(s) SubCutaneous at bedtime  insulin lispro (ADMELOG) corrective regimen sliding scale   SubCutaneous three times a day before meals  insulin lispro (ADMELOG) corrective regimen sliding scale   SubCutaneous at bedtime  insulin lispro Injectable (ADMELOG) 2 Unit(s) SubCutaneous three times a day before meals  levoFLOXacin  Tablet 250 milliGRAM(s) Oral every 24 hours  metoprolol tartrate 50 milliGRAM(s) Oral two times a day  phytonadione   Solution 5 milliGRAM(s) Oral daily  posaconazole DR Tablet 300 milliGRAM(s) Oral daily  sodium chloride 0.9%. 1000 milliLiter(s) (25 mL/Hr) IV Continuous <Continuous>  venetoclax 100 milliGRAM(s) Oral <User Schedule>  verapamil  milliGRAM(s) Oral daily    MEDICATIONS  (PRN):  acetaminophen     Tablet .. 650 milliGRAM(s) Oral every 6 hours PRN Temp greater or equal to 38C (100.4F), Mild Pain (1 - 3)  aluminum hydroxide/magnesium hydroxide/simethicone Suspension 30 milliLiter(s) Oral every 4 hours PRN Dyspepsia  dextrose Oral Gel 15 Gram(s) Oral once PRN Blood Glucose LESS THAN 70 milliGRAM(s)/deciliter      LABS:                        7.7    1.04  )-----------( 95       ( 20 Apr 2022 11:02 )             23.7     Hgb Trend: 7.7<--, 7.9<--, 9.2<--, 8.7<--, 8.1<--  04-20    136  |  103  |  19  ----------------------------<  143<H>  4.2   |  23  |  0.77    Ca    7.1<L>      20 Apr 2022 06:56  Phos  2.8     04-20  Mg     1.9     04-20    TPro  6.0  /  Alb  2.8<L>  /  TBili  0.8  /  DBili  x   /  AST  33  /  ALT  28  /  AlkPhos  63  04-20    Creatinine Trend: 0.77<--, 0.78<--, 0.73<--, 0.78<--, 0.74<--, 0.73<--  PT/INR - ( 19 Apr 2022 13:57 )   PT: 15.3 sec;   INR: 1.32 ratio         PTT - ( 20 Apr 2022 03:26 )  PTT:74.3 sec      Venous Blood Gas:  04-20 @ 00:45  7.31/37/31/19/51.3  VBG Lactate: 2.3      RADIOLOGY & ADDITIONAL TESTS: reviewed    Assessment and Plan:  89F w/ T2DM, HTN who initially presented to the [facility] ED for palpitations x 3 days and weakness x 1 day., found to be in a-fib w/ RVR. Labs significant for + with 88% blasts concerning for acute leukemia, admitted to MICU for urgent leukaphereses, s/p  CHIOMA bro and leukapheresis with improvement in counts, flow cytometry showed AML, bone marrow bx confirmed AML. Admitted to MICU s/p ROSC after cardiac arrest.    Neuro  -Intubated and sedated    CV  #cardiac arrest  On 4/20, had asystole leading to PEA, had 4 rounds of CPR with 5 rounds epi with 2 bicarb and 2 Ca. ROSC. then bradycardic, levo started    Resp  -Intubated s/p cardiac arrest for airway protection  -Hx of subsegmental PE, previously on heparin gtt    GI    /renal  -Molina placed, monitor UOP    ID    Endo    Heme/Onc  #AML    #TLS    Ethics  -Full code MICU Accept Note    CHIEF COMPLAINT: post cardiac arrest    HPI / INTERVAL HISTORY:  89F w/ T2DM, HTN who initially presented to the [facility] ED for palpitations x 3 days and weakness x 1 day. She felt very weak and lightheaded while standing today, feeling like she would pass out so this prompted her to come to the ED. Recently she also had a cough x 3 weeks. She was seen by PMD who felt this was likely a URI. Pt continued to have cough with weakness and HR in 140s. Pt denies any fever, chills, SOB, chest pain, or and pain. She did not take her medications this morning because she was feeling unwell. She also does not take her metformin regularly.      In [facility] ED, pt in a-fib w/ RVR HR 160s, saturating well on RA, BP hypertensive, RR 14. Labs significant for + with 88% blasts concerning for acute leukemia. Heme consulted in ED, patient transferred to MICU for urgent leukaphereses. Received RIJ shiley and leukapheresis with improvement in counts. Flow cytometry showed AML. Bone marrow bx done on 4/13. Symptoms improved, transferred to medicine oncology floor for further management.    Given tretinoin 40mg q12h and dexamethasone 10mg q12 4/13-4/14. BM Bx sent 4/13. Given 1unit platlet for thrombocytopenia to 38. Started on decitabine and venetoclax 4/15, held 4/16, and restarted 4/18.    RRT on 4/19 ovn for respiratory distress, chest x ray showing congestion, placed on bipap and given diuretics with improvement. Of note, patient has a subsegmental pulmonary artery embolus in RUL, was on heparin gtt for AC but dc'ed due to thrombocytopenia. Concern for TLS, received rasburicase.    Cardiac arrested on floors, admitted for MICU for further management.      PAST MEDICAL & SURGICAL HISTORY:  H/O: HTN (hypertension)    Diabetes mellitus    Chronic atrial fibrillation    H/O: hysterectomy  indication: uterine cancer        FAMILY HISTORY:  No pertinent family history in first degree relatives        SOCIAL HISTORY:  Smoking: [  ] Never Smoked  [  ] Former Smoker (# packs x # years)  [  ] Current Smoker (# packs x # years)  Substance Use:   EtOH Use:   Marital Status: [  ] Single  [  ]   [  ]   [  ]   Sexual History:   Occupation:  Recent Travel:  Country of Birth:   Advance Directives:     HOME MEDICATIONS:      Allergies    No Known Allergies    Intolerances    Unable to perform ROS due to mental status     OBJECTIVE:  ICU Vital Signs Last 24 Hrs  T(C): 37.4 (20 Apr 2022 09:15), Max: 37.4 (20 Apr 2022 09:15)  T(F): 99.3 (20 Apr 2022 09:15), Max: 99.3 (20 Apr 2022 09:15)  HR: 69 (20 Apr 2022 09:36) (60 - 90)  BP: 110/73 (20 Apr 2022 09:15) (100/66 - 173/87)  BP(mean): --  ABP: --  ABP(mean): --  RR: 18 (20 Apr 2022 09:36) (16 - 20)  SpO2: 100% (20 Apr 2022 09:36) (90% - 100%)        04-19 @ 07:01  -  04-20 @ 07:00  --------------------------------------------------------  IN: 1742 mL / OUT: 1501 mL / NET: 241 mL    04-20 @ 07:01 - 04-20 @ 12:54  --------------------------------------------------------  IN: 225 mL / OUT: 0 mL / NET: 225 mL      CAPILLARY BLOOD GLUCOSE      POCT Blood Glucose.: 95 mg/dL (20 Apr 2022 12:17)      PHYSICAL EXAM:  General: Intubated, tracking  HEENT: No icterus or conjunctival injection   Neck: Supple   Chest/Lungs: Bilateral breath sounds with scant rales, no wheezing or rhonchi  Heart: Normal s1 and s2 w/o murmur, rub or gallop   Abdomen: NT, ND, Soft, No hepatomegaly   Extremities: Cool distally, Trace edema, No cyanosis   Skin: Cool, dry, No obvious rashes   Neuro: Alert and Oriented x0, non-verbal, eyes opening spontaneously   Psych: Unable to be assessed     LINES:     HOSPITAL MEDICATIONS:  MEDICATIONS  (STANDING):  allopurinol 300 milliGRAM(s) Oral daily  benzonatate 100 milliGRAM(s) Oral every 8 hours  Biotene Dry Mouth Oral Rinse 15 milliLiter(s) Swish and Spit three times a day  chlorhexidine 4% Liquid 1 Application(s) Topical <User Schedule>  dextrose 5%. 1000 milliLiter(s) (50 mL/Hr) IV Continuous <Continuous>  dextrose 5%. 1000 milliLiter(s) (100 mL/Hr) IV Continuous <Continuous>  dextrose 50% Injectable 25 Gram(s) IV Push once  dextrose 50% Injectable 12.5 Gram(s) IV Push once  dextrose 50% Injectable 25 Gram(s) IV Push once  famotidine    Tablet 20 milliGRAM(s) Oral daily  furosemide   Injectable 40 milliGRAM(s) IV Push once  glucagon  Injectable 1 milliGRAM(s) IntraMuscular once  insulin glargine Injectable (LANTUS) 10 Unit(s) SubCutaneous at bedtime  insulin lispro (ADMELOG) corrective regimen sliding scale   SubCutaneous three times a day before meals  insulin lispro (ADMELOG) corrective regimen sliding scale   SubCutaneous at bedtime  insulin lispro Injectable (ADMELOG) 2 Unit(s) SubCutaneous three times a day before meals  levoFLOXacin  Tablet 250 milliGRAM(s) Oral every 24 hours  metoprolol tartrate 50 milliGRAM(s) Oral two times a day  phytonadione   Solution 5 milliGRAM(s) Oral daily  posaconazole DR Tablet 300 milliGRAM(s) Oral daily  sodium chloride 0.9%. 1000 milliLiter(s) (25 mL/Hr) IV Continuous <Continuous>  venetoclax 100 milliGRAM(s) Oral <User Schedule>  verapamil  milliGRAM(s) Oral daily    MEDICATIONS  (PRN):  acetaminophen     Tablet .. 650 milliGRAM(s) Oral every 6 hours PRN Temp greater or equal to 38C (100.4F), Mild Pain (1 - 3)  aluminum hydroxide/magnesium hydroxide/simethicone Suspension 30 milliLiter(s) Oral every 4 hours PRN Dyspepsia  dextrose Oral Gel 15 Gram(s) Oral once PRN Blood Glucose LESS THAN 70 milliGRAM(s)/deciliter      LABS:                        7.7    1.04  )-----------( 95       ( 20 Apr 2022 11:02 )             23.7     Hgb Trend: 7.7<--, 7.9<--, 9.2<--, 8.7<--, 8.1<--  04-20    136  |  103  |  19  ----------------------------<  143<H>  4.2   |  23  |  0.77    Ca    7.1<L>      20 Apr 2022 06:56  Phos  2.8     04-20  Mg     1.9     04-20    TPro  6.0  /  Alb  2.8<L>  /  TBili  0.8  /  DBili  x   /  AST  33  /  ALT  28  /  AlkPhos  63  04-20    Creatinine Trend: 0.77<--, 0.78<--, 0.73<--, 0.78<--, 0.74<--, 0.73<--  PT/INR - ( 19 Apr 2022 13:57 )   PT: 15.3 sec;   INR: 1.32 ratio         PTT - ( 20 Apr 2022 03:26 )  PTT:74.3 sec      Venous Blood Gas:  04-20 @ 00:45  7.31/37/31/19/51.3  VBG Lactate: 2.3      RADIOLOGY & ADDITIONAL TESTS: reviewed    Assessment and Plan:  89F w/ T2DM, HTN who initially presented to the [facility] ED for palpitations x 3 days and weakness x 1 day., found to be in a-fib w/ RVR. Labs significant for + with 88% blasts concerning for acute leukemia, admitted to MICU for urgent leukaphereses, s/p  CHIOMA bro and leukapheresis with improvement in counts, flow cytometry showed AML, bone marrow bx confirmed AML. Admitted to MICU s/p ROSC after cardiac arrest.    Neuro  -Intubated and sedated    CV  #cardiac arrest  On 4/20, had asystole leading to PEA, had 4 rounds of CPR with 5 rounds epi with 2 bicarb and 2 Ca. ROSC. then bradycardic, levo started    Resp  -Intubated s/p cardiac arrest for airway protection  -Hx of subsegmental PE, previously on heparin gtt    GI    /renal  -Molina placed, monitor UOP    ID    Endo    Heme/Onc  #AML    #TLS    Ethics  -Full code MICU Accept Note    CHIEF COMPLAINT: post cardiac arrest    HPI / INTERVAL HISTORY:  89F w/ T2DM, HTN who initially presented to the [facility] ED for palpitations x 3 days and weakness x 1 day. She felt very weak and lightheaded while standing today, feeling like she would pass out so this prompted her to come to the ED. Recently she also had a cough x 3 weeks. She was seen by PMD who felt this was likely a URI. Pt continued to have cough with weakness and HR in 140s. Pt denies any fever, chills, SOB, chest pain, or and pain. She did not take her medications this morning because she was feeling unwell. She also does not take her metformin regularly.      In [facility] ED, pt in a-fib w/ RVR HR 160s, saturating well on RA, BP hypertensive, RR 14. Labs significant for + with 88% blasts concerning for acute leukemia. Heme consulted in ED, patient transferred to MICU for urgent leukaphereses. Received RIJ shiley and leukapheresis with improvement in counts. Flow cytometry showed AML. Bone marrow bx done on 4/13. Symptoms improved, transferred to medicine oncology floor for further management.    Given tretinoin 40mg q12h and dexamethasone 10mg q12 4/13-4/14. BM Bx sent 4/13. Given 1unit platlet for thrombocytopenia to 38. Started on decitabine and venetoclax 4/15, held 4/16, and restarted 4/18.    RRT on 4/19 ovn for respiratory distress, chest x ray showing congestion, placed on bipap and given diuretics with improvement. Of note, patient has a subsegmental pulmonary artery embolus in RUL, was on heparin gtt for AC but dc'ed due to thrombocytopenia. Concern for TLS, received rasburicase.    Cardiac arrested on floors, admitted for MICU for further management.      PAST MEDICAL & SURGICAL HISTORY:  H/O: HTN (hypertension)    Diabetes mellitus    Chronic atrial fibrillation    H/O: hysterectomy  indication: uterine cancer        FAMILY HISTORY:  No pertinent family history in first degree relatives        SOCIAL HISTORY:  Smoking: [  ] Never Smoked  [  ] Former Smoker (# packs x # years)  [  ] Current Smoker (# packs x # years)  Substance Use:   EtOH Use:   Marital Status: [  ] Single  [  ]   [  ]   [  ]   Sexual History:   Occupation:  Recent Travel:  Country of Birth:   Advance Directives:     HOME MEDICATIONS:      Allergies    No Known Allergies    Intolerances    Unable to perform ROS due to mental status     OBJECTIVE:  ICU Vital Signs Last 24 Hrs  T(C): 37.4 (20 Apr 2022 09:15), Max: 37.4 (20 Apr 2022 09:15)  T(F): 99.3 (20 Apr 2022 09:15), Max: 99.3 (20 Apr 2022 09:15)  HR: 69 (20 Apr 2022 09:36) (60 - 90)  BP: 110/73 (20 Apr 2022 09:15) (100/66 - 173/87)  BP(mean): --  ABP: --  ABP(mean): --  RR: 18 (20 Apr 2022 09:36) (16 - 20)  SpO2: 100% (20 Apr 2022 09:36) (90% - 100%)        04-19 @ 07:01  -  04-20 @ 07:00  --------------------------------------------------------  IN: 1742 mL / OUT: 1501 mL / NET: 241 mL    04-20 @ 07:01 - 04-20 @ 12:54  --------------------------------------------------------  IN: 225 mL / OUT: 0 mL / NET: 225 mL      CAPILLARY BLOOD GLUCOSE      POCT Blood Glucose.: 95 mg/dL (20 Apr 2022 12:17)      PHYSICAL EXAM:  General: Intubated, tracking  HEENT: No icterus or conjunctival injection   Neck: Supple   Chest/Lungs: Bilateral breath sounds with scant rales, no wheezing or rhonchi  Heart: Normal s1 and s2 w/o murmur, rub or gallop   Abdomen: NT, ND, Soft, No hepatomegaly   Extremities: Cool distally, Trace edema, No cyanosis   Skin: Cool, dry, No obvious rashes   Neuro: Alert and Oriented x0, non-verbal, eyes opening spontaneously   Psych: Unable to be assessed     LINES:     HOSPITAL MEDICATIONS:  MEDICATIONS  (STANDING):  allopurinol 300 milliGRAM(s) Oral daily  benzonatate 100 milliGRAM(s) Oral every 8 hours  Biotene Dry Mouth Oral Rinse 15 milliLiter(s) Swish and Spit three times a day  chlorhexidine 4% Liquid 1 Application(s) Topical <User Schedule>  dextrose 5%. 1000 milliLiter(s) (50 mL/Hr) IV Continuous <Continuous>  dextrose 5%. 1000 milliLiter(s) (100 mL/Hr) IV Continuous <Continuous>  dextrose 50% Injectable 25 Gram(s) IV Push once  dextrose 50% Injectable 12.5 Gram(s) IV Push once  dextrose 50% Injectable 25 Gram(s) IV Push once  famotidine    Tablet 20 milliGRAM(s) Oral daily  furosemide   Injectable 40 milliGRAM(s) IV Push once  glucagon  Injectable 1 milliGRAM(s) IntraMuscular once  insulin glargine Injectable (LANTUS) 10 Unit(s) SubCutaneous at bedtime  insulin lispro (ADMELOG) corrective regimen sliding scale   SubCutaneous three times a day before meals  insulin lispro (ADMELOG) corrective regimen sliding scale   SubCutaneous at bedtime  insulin lispro Injectable (ADMELOG) 2 Unit(s) SubCutaneous three times a day before meals  levoFLOXacin  Tablet 250 milliGRAM(s) Oral every 24 hours  metoprolol tartrate 50 milliGRAM(s) Oral two times a day  phytonadione   Solution 5 milliGRAM(s) Oral daily  posaconazole DR Tablet 300 milliGRAM(s) Oral daily  sodium chloride 0.9%. 1000 milliLiter(s) (25 mL/Hr) IV Continuous <Continuous>  venetoclax 100 milliGRAM(s) Oral <User Schedule>  verapamil  milliGRAM(s) Oral daily    MEDICATIONS  (PRN):  acetaminophen     Tablet .. 650 milliGRAM(s) Oral every 6 hours PRN Temp greater or equal to 38C (100.4F), Mild Pain (1 - 3)  aluminum hydroxide/magnesium hydroxide/simethicone Suspension 30 milliLiter(s) Oral every 4 hours PRN Dyspepsia  dextrose Oral Gel 15 Gram(s) Oral once PRN Blood Glucose LESS THAN 70 milliGRAM(s)/deciliter      LABS:                        7.7    1.04  )-----------( 95       ( 20 Apr 2022 11:02 )             23.7     Hgb Trend: 7.7<--, 7.9<--, 9.2<--, 8.7<--, 8.1<--  04-20    136  |  103  |  19  ----------------------------<  143<H>  4.2   |  23  |  0.77    Ca    7.1<L>      20 Apr 2022 06:56  Phos  2.8     04-20  Mg     1.9     04-20    TPro  6.0  /  Alb  2.8<L>  /  TBili  0.8  /  DBili  x   /  AST  33  /  ALT  28  /  AlkPhos  63  04-20    Creatinine Trend: 0.77<--, 0.78<--, 0.73<--, 0.78<--, 0.74<--, 0.73<--  PT/INR - ( 19 Apr 2022 13:57 )   PT: 15.3 sec;   INR: 1.32 ratio         PTT - ( 20 Apr 2022 03:26 )  PTT:74.3 sec      Venous Blood Gas:  04-20 @ 00:45  7.31/37/31/19/51.3  VBG Lactate: 2.3      RADIOLOGY & ADDITIONAL TESTS: reviewed    Assessment and Plan:  89F w/ T2DM, HTN who initially presented to the [facility] ED for palpitations x 3 days and weakness x 1 day., found to be in a-fib w/ RVR. Labs significant for + with 88% blasts concerning for acute leukemia, admitted to MICU for urgent leukaphereses, s/p  CHIOMA bro and leukapheresis with improvement in counts, flow cytometry showed AML, bone marrow bx confirmed AML. Admitted to MICU s/p ROSC after cardiac arrest.    Neuro  -Intubated and sedated    CV  #cardiac arrest  On 4/20, had asystole leading to PEA, had 4 rounds of CPR with 5 rounds epi with 2 bicarb and 2 Ca. ROSC. then bradycardic, levo started  -Pt is now DNR per family's wishes, CHARLIEST in chart    #afib  New afib noted this admission, previously on heparin gtt for AC    Resp  -Intubated s/p cardiac arrest for airway protection  -Hx of subsegmental PE, previously on heparin gtt, stopped due to Hg drop 9 to 7    GI  -NPO    /renal  -Molina placed, monitor UOP    ID    Endo    Heme/Onc  #AML  Initially presented with leukocytosis up to 270, 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 S/p leukopheresisx1, tretinoin, dexamethasone, and on decitabine and venetoclax  -Appreciate onc recs    #TLS    Ethics  -DNR, MOLST in chart MICU Accept Note    CHIEF COMPLAINT: post cardiac arrest    HPI / INTERVAL HISTORY:  89F w/ T2DM, HTN who initially presented to the [facility] ED for palpitations x 3 days and weakness x 1 day. She felt very weak and lightheaded while standing today, feeling like she would pass out so this prompted her to come to the ED. Recently she also had a cough x 3 weeks. She was seen by PMD who felt this was likely a URI. Pt continued to have cough with weakness and HR in 140s. Pt denies any fever, chills, SOB, chest pain, or and pain. She did not take her medications this morning because she was feeling unwell. She also does not take her metformin regularly.      In [facility] ED, pt in a-fib w/ RVR HR 160s, saturating well on RA, BP hypertensive, RR 14. Labs significant for + with 88% blasts concerning for acute leukemia. Heme consulted in ED, patient transferred to MICU for urgent leukaphereses. Received RIJ shiley and leukapheresis with improvement in counts. Flow cytometry showed AML. Bone marrow bx done on 4/13. Symptoms improved, transferred to medicine oncology floor for further management.    Given tretinoin 40mg q12h and dexamethasone 10mg q12 4/13-4/14. BM Bx sent 4/13. Given 1unit platlet for thrombocytopenia to 38. Started on decitabine and venetoclax 4/15, held 4/16, and restarted 4/18.    RRT on 4/19 ovn for respiratory distress, chest x ray showing congestion, placed on bipap and given diuretics with improvement. Of note, patient has a subsegmental pulmonary artery embolus in RUL, was on heparin gtt for AC but dc'ed due to thrombocytopenia. Concern for TLS, received rasburicase.    Cardiac arrested on floors, admitted for MICU for further management.      PAST MEDICAL & SURGICAL HISTORY:  H/O: HTN (hypertension)    Diabetes mellitus    Chronic atrial fibrillation    H/O: hysterectomy  indication: uterine cancer        FAMILY HISTORY:  No pertinent family history in first degree relatives        SOCIAL HISTORY:  Smoking: [  ] Never Smoked  [  ] Former Smoker (# packs x # years)  [  ] Current Smoker (# packs x # years)  Substance Use:   EtOH Use:   Marital Status: [  ] Single  [  ]   [  ]   [  ]   Sexual History:   Occupation:  Recent Travel:  Country of Birth:   Advance Directives:     HOME MEDICATIONS:      Allergies    No Known Allergies    Intolerances    Unable to perform ROS due to mental status     OBJECTIVE:  ICU Vital Signs Last 24 Hrs  T(C): 37.4 (20 Apr 2022 09:15), Max: 37.4 (20 Apr 2022 09:15)  T(F): 99.3 (20 Apr 2022 09:15), Max: 99.3 (20 Apr 2022 09:15)  HR: 69 (20 Apr 2022 09:36) (60 - 90)  BP: 110/73 (20 Apr 2022 09:15) (100/66 - 173/87)  BP(mean): --  ABP: --  ABP(mean): --  RR: 18 (20 Apr 2022 09:36) (16 - 20)  SpO2: 100% (20 Apr 2022 09:36) (90% - 100%)        04-19 @ 07:01  -  04-20 @ 07:00  --------------------------------------------------------  IN: 1742 mL / OUT: 1501 mL / NET: 241 mL    04-20 @ 07:01 - 04-20 @ 12:54  --------------------------------------------------------  IN: 225 mL / OUT: 0 mL / NET: 225 mL      CAPILLARY BLOOD GLUCOSE      POCT Blood Glucose.: 95 mg/dL (20 Apr 2022 12:17)      PHYSICAL EXAM:  General: Intubated, tracking  HEENT: No icterus or conjunctival injection   Neck: Supple   Chest/Lungs: Bilateral breath sounds with scant rales, no wheezing or rhonchi  Heart: Normal s1 and s2 w/o murmur, rub or gallop   Abdomen: NT, ND, Soft, No hepatomegaly   Extremities: Cool distally, Trace edema, No cyanosis   Skin: Cool, dry, No obvious rashes   Neuro: Alert and Oriented x0, non-verbal, eyes opening spontaneously   Psych: Unable to be assessed     LINES:     HOSPITAL MEDICATIONS:  MEDICATIONS  (STANDING):  allopurinol 300 milliGRAM(s) Oral daily  benzonatate 100 milliGRAM(s) Oral every 8 hours  Biotene Dry Mouth Oral Rinse 15 milliLiter(s) Swish and Spit three times a day  chlorhexidine 4% Liquid 1 Application(s) Topical <User Schedule>  dextrose 5%. 1000 milliLiter(s) (50 mL/Hr) IV Continuous <Continuous>  dextrose 5%. 1000 milliLiter(s) (100 mL/Hr) IV Continuous <Continuous>  dextrose 50% Injectable 25 Gram(s) IV Push once  dextrose 50% Injectable 12.5 Gram(s) IV Push once  dextrose 50% Injectable 25 Gram(s) IV Push once  famotidine    Tablet 20 milliGRAM(s) Oral daily  furosemide   Injectable 40 milliGRAM(s) IV Push once  glucagon  Injectable 1 milliGRAM(s) IntraMuscular once  insulin glargine Injectable (LANTUS) 10 Unit(s) SubCutaneous at bedtime  insulin lispro (ADMELOG) corrective regimen sliding scale   SubCutaneous three times a day before meals  insulin lispro (ADMELOG) corrective regimen sliding scale   SubCutaneous at bedtime  insulin lispro Injectable (ADMELOG) 2 Unit(s) SubCutaneous three times a day before meals  levoFLOXacin  Tablet 250 milliGRAM(s) Oral every 24 hours  metoprolol tartrate 50 milliGRAM(s) Oral two times a day  phytonadione   Solution 5 milliGRAM(s) Oral daily  posaconazole DR Tablet 300 milliGRAM(s) Oral daily  sodium chloride 0.9%. 1000 milliLiter(s) (25 mL/Hr) IV Continuous <Continuous>  venetoclax 100 milliGRAM(s) Oral <User Schedule>  verapamil  milliGRAM(s) Oral daily    MEDICATIONS  (PRN):  acetaminophen     Tablet .. 650 milliGRAM(s) Oral every 6 hours PRN Temp greater or equal to 38C (100.4F), Mild Pain (1 - 3)  aluminum hydroxide/magnesium hydroxide/simethicone Suspension 30 milliLiter(s) Oral every 4 hours PRN Dyspepsia  dextrose Oral Gel 15 Gram(s) Oral once PRN Blood Glucose LESS THAN 70 milliGRAM(s)/deciliter      LABS:                        7.7    1.04  )-----------( 95       ( 20 Apr 2022 11:02 )             23.7     Hgb Trend: 7.7<--, 7.9<--, 9.2<--, 8.7<--, 8.1<--  04-20    136  |  103  |  19  ----------------------------<  143<H>  4.2   |  23  |  0.77    Ca    7.1<L>      20 Apr 2022 06:56  Phos  2.8     04-20  Mg     1.9     04-20    TPro  6.0  /  Alb  2.8<L>  /  TBili  0.8  /  DBili  x   /  AST  33  /  ALT  28  /  AlkPhos  63  04-20    Creatinine Trend: 0.77<--, 0.78<--, 0.73<--, 0.78<--, 0.74<--, 0.73<--  PT/INR - ( 19 Apr 2022 13:57 )   PT: 15.3 sec;   INR: 1.32 ratio         PTT - ( 20 Apr 2022 03:26 )  PTT:74.3 sec      Venous Blood Gas:  04-20 @ 00:45  7.31/37/31/19/51.3  VBG Lactate: 2.3      RADIOLOGY & ADDITIONAL TESTS: reviewed    Assessment and Plan:  89F w/ T2DM, HTN who initially presented [facility] ED for palpitations x 3 days and weakness x 1 day., found to be in a-fib w/ RVR. Labs significant for + with 88% blasts concerning for acute leukemia, admitted to MICU for urgent leukaphereses, s/p  CHIOMA bro and leukapheresis with improvement in counts, flow cytometry showed AML, bone marrow bx confirmed AML. Admitted to MICU s/p ROSC after cardiac arrest.    Neuro  -Intubated and sedated    CV  #cardiac arrest  On 4/20, had asystole leading to PEA, had 4 rounds of CPR with 5 rounds epi with 2 bicarb and 2 Ca. ROSC. then bradycardic, levo started  -Pt is now DNR per family's wishes, CHARLIEST in chart    #afib  New afib noted this admission, previously on heparin gtt for AC    Resp  -Intubated s/p cardiac arrest for airway protection  -Hx of subsegmental PE, previously on heparin gtt, stopped due to Hg drop 9 to 7    #pneumothorax  -noted 4/20, chest tube placed    GI  -NPO    /renal  -Molina placed, monitor UOP    ID  #Pancytopenia  on levaquin and pasconazole for prophylaxis  Plt >10k or > 15k if febrile or > 50k if bleeding    Endo  #T2DM  -low ISS    Heme/Onc  #AML  Initially presented with leukocytosis up to 270, 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 She felt very weak and lightheaded while standing today, feeling like she would pass out so this prompted her to come to the ED. Recently she also had a cough x 3 weeks. She was seen by PMD who felt this was likely a URI. Pt continued to have cough with weakness and HR in 140s. Pt denies any fever, chills, SOB, chest pain, or and pain. She did not take her medications this morning because she was feeling unwell. She also does not take her metformin regularly.      In [facility] ED, pt in a-fib w/ RVR HR 160s, saturating well on RA, BP hypertensive, RR 14. Labs significant for + with 88% blasts concerning for acute leukemia. Heme consulted in ED, patient transferred to MICU for urgent leukaphereses. Received RIJ shiley and leukapheresis with improvement in counts. Flow cytometry showed AML. Bone marrow bx done on 4/13. Symptoms improved, transferred to medicine oncology floor for further management.    Given tretinoin 40mg q12h and dexamethasone 10mg q12 4/13-4/14. BM Bx sent 4/13. Given 1unit platlet for thrombocytopenia to 38. Started on decitabine and venetoclax 4/15, held 4/16, and restarted 4/18.    RRT on 4/19 ovn for respiratory distress, chest x ray showing congestion, placed on bipap and given diuretics with improvement. Of note, patient has a subsegmental pulmonary artery embolus in RUL, was on heparin gtt for AC but dc'ed due to thrombocytopenia. Concern for TLS, received rasburicase.    Cardiac arrested on floors, admitted for MICU for further management.      PAST MEDICAL & SURGICAL HISTORY:  H/O: HTN (hypertension)    Diabetes mellitus    Chronic atrial fibrillation    H/O: hysterectomy  indication: uterine cancer        FAMILY HISTORY:  No pertinent family history in first degree relatives        SOCIAL HISTORY:  Smoking: [  ] Never Smoked  [  ] Former Smoker (# packs x # years)  [  ] Current Smoker (# packs x # years)  Substance Use:   EtOH Use:   Marital Status: [  ] Single  [  ]   [  ]   [  ]   Sexual History:   Occupation:  Recent Travel:  Country of Birth:   Advance Directives:     HOME MEDICATIONS:      Allergies    No Known Allergies    Intolerances    Unable to perform ROS due to mental status     OBJECTIVE:  ICU Vital Signs Last 24 Hrs  T(C): 37.4 (20 Apr 2022 09:15), Max: 37.4 (20 Apr 2022 09:15)  T(F): 99.3 (20 Apr 2022 09:15), Max: 99.3 (20 Apr 2022 09:15)  HR: 69 (20 Apr 2022 09:36) (60 - 90)  BP: 110/73 (20 Apr 2022 09:15) (100/66 - 173/87)  BP(mean): --  ABP: --  ABP(mean): --  RR: 18 (20 Apr 2022 09:36) (16 - 20)  SpO2: 100% (20 Apr 2022 09:36) (90% - 100%)        04-19 @ 07:01  -  04-20 @ 07:00  --------------------------------------------------------  IN: 1742 mL / OUT: 1501 mL / NET: 241 mL    04-20 @ 07:01 - 04-20 @ 12:54  --------------------------------------------------------  IN: 225 mL / OUT: 0 mL / NET: 225 mL      CAPILLARY BLOOD GLUCOSE      POCT Blood Glucose.: 95 mg/dL (20 Apr 2022 12:17)      PHYSICAL EXAM:  General: Intubated, tracking  HEENT: No icterus or conjunctival injection   Neck: Supple   Chest/Lungs: Bilateral breath sounds with scant rales, no wheezing or rhonchi  Heart: Normal s1 and s2 w/o murmur, rub or gallop   Abdomen: NT, ND, Soft, No hepatomegaly   Extremities: Cool distally, Trace edema, No cyanosis   Skin: Cool, dry, No obvious rashes   Neuro: Alert and Oriented x0, non-verbal, eyes opening spontaneously   Psych: Unable to be assessed     LINES:     HOSPITAL MEDICATIONS:  MEDICATIONS  (STANDING):  allopurinol 300 milliGRAM(s) Oral daily  benzonatate 100 milliGRAM(s) Oral every 8 hours  Biotene Dry Mouth Oral Rinse 15 milliLiter(s) Swish and Spit three times a day  chlorhexidine 4% Liquid 1 Application(s) Topical <User Schedule>  dextrose 5%. 1000 milliLiter(s) (50 mL/Hr) IV Continuous <Continuous>  dextrose 5%. 1000 milliLiter(s) (100 mL/Hr) IV Continuous <Continuous>  dextrose 50% Injectable 25 Gram(s) IV Push once  dextrose 50% Injectable 12.5 Gram(s) IV Push once  dextrose 50% Injectable 25 Gram(s) IV Push once  famotidine    Tablet 20 milliGRAM(s) Oral daily  furosemide   Injectable 40 milliGRAM(s) IV Push once  glucagon  Injectable 1 milliGRAM(s) IntraMuscular once  insulin glargine Injectable (LANTUS) 10 Unit(s) SubCutaneous at bedtime  insulin lispro (ADMELOG) corrective regimen sliding scale   SubCutaneous three times a day before meals  insulin lispro (ADMELOG) corrective regimen sliding scale   SubCutaneous at bedtime  insulin lispro Injectable (ADMELOG) 2 Unit(s) SubCutaneous three times a day before meals  levoFLOXacin  Tablet 250 milliGRAM(s) Oral every 24 hours  metoprolol tartrate 50 milliGRAM(s) Oral two times a day  phytonadione   Solution 5 milliGRAM(s) Oral daily  posaconazole DR Tablet 300 milliGRAM(s) Oral daily  sodium chloride 0.9%. 1000 milliLiter(s) (25 mL/Hr) IV Continuous <Continuous>  venetoclax 100 milliGRAM(s) Oral <User Schedule>  verapamil  milliGRAM(s) Oral daily    MEDICATIONS  (PRN):  acetaminophen     Tablet .. 650 milliGRAM(s) Oral every 6 hours PRN Temp greater or equal to 38C (100.4F), Mild Pain (1 - 3)  aluminum hydroxide/magnesium hydroxide/simethicone Suspension 30 milliLiter(s) Oral every 4 hours PRN Dyspepsia  dextrose Oral Gel 15 Gram(s) Oral once PRN Blood Glucose LESS THAN 70 milliGRAM(s)/deciliter      LABS:                        7.7    1.04  )-----------( 95       ( 20 Apr 2022 11:02 )             23.7     Hgb Trend: 7.7<--, 7.9<--, 9.2<--, 8.7<--, 8.1<--  04-20    136  |  103  |  19  ----------------------------<  143<H>  4.2   |  23  |  0.77    Ca    7.1<L>      20 Apr 2022 06:56  Phos  2.8     04-20  Mg     1.9     04-20    TPro  6.0  /  Alb  2.8<L>  /  TBili  0.8  /  DBili  x   /  AST  33  /  ALT  28  /  AlkPhos  63  04-20    Creatinine Trend: 0.77<--, 0.78<--, 0.73<--, 0.78<--, 0.74<--, 0.73<--  PT/INR - ( 19 Apr 2022 13:57 )   PT: 15.3 sec;   INR: 1.32 ratio         PTT - ( 20 Apr 2022 03:26 )  PTT:74.3 sec      Venous Blood Gas:  04-20 @ 00:45  7.31/37/31/19/51.3  VBG Lactate: 2.3      RADIOLOGY & ADDITIONAL TESTS: reviewed    Assessment and Plan:  89F w/ T2DM, HTN who initially presented to the [facility] ED for palpitations x 3 days and weakness x 1 day., found to be in a-fib w/ RVR. Labs significant for + with 88% blasts concerning for acute leukemia, admitted to MICU for urgent leukaphereses, s/p  CHIOMA bro and leukapheresis with improvement in counts, flow cytometry showed AML, bone marrow bx confirmed AML. Admitted to MICU s/p ROSC after cardiac arrest.    Neuro  -Intubated and sedated    CV  #cardiac arrest  On 4/20, had asystole leading to PEA, had 4 rounds of CPR with 5 rounds epi with 2 bicarb and 2 Ca. ROSC. then bradycardic, levo started  -Pt is now DNR per family's wishes, CHARLIEST in chart    #afib  New afib noted this admission, CHADSVASC score 5, previously on heparin gtt for AC    Resp  -Intubated s/p cardiac arrest for airway protection  -Hx of subsegmental PE, previously on heparin gtt, stopped due to Hg drop 9 to 7 and worsening thrombocytopenia    #pneumothorax  -noted 4/20, chest tube placed    GI  -NPO    /renal  -Molina placed, monitor UOP    ID  #Pancytopenia  on levaquin and pasconazole for prophylaxis  Plt >10k or > 15k if febrile or > 50k if bleeding    Endo  #T2DM  -low ISS    Heme/Onc  #AML  Initially presented with leukocytosis up to 270, 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 She felt very weak and lightheaded while standing today, feeling like she would pass out so this prompted her to come to the ED. Recently she also had a cough x 3 weeks. She was seen by PMD who felt this was likely a URI. Pt continued to have cough with weakness and HR in 140s. Pt denies any fever, chills, SOB, chest pain, or and pain. She did not take her medications this morning because she was feeling unwell. She also does not take her metformin regularly.      In [facility] ED, pt in a-fib w/ RVR HR 160s, saturating well on RA, BP hypertensive, RR 14. Labs significant for + with 88% blasts concerning for acute leukemia. Heme consulted in ED, patient transferred to MICU for urgent leukaphereses. Received RIJ shiley and leukapheresis with improvement in counts. Flow cytometry showed AML. Bone marrow bx done on 4/13. Symptoms improved, transferred to medicine oncology floor for further management.    Given tretinoin 40mg q12h and dexamethasone 10mg q12 4/13-4/14. BM Bx sent 4/13. Given 1unit platlet for thrombocytopenia to 38. Started on decitabine and venetoclax 4/15, held 4/16, and restarted 4/18.    RRT on 4/19 ovn for respiratory distress, chest x ray showing congestion, placed on bipap and given diuretics with improvement. Of note, patient has a subsegmental pulmonary artery embolus in RUL, was on heparin gtt for AC but dc'ed due to thrombocytopenia. Concern for TLS, received rasburicase.    Cardiac arrested on floors, admitted for MICU for further management.      PAST MEDICAL & SURGICAL HISTORY:  H/O: HTN (hypertension)    Diabetes mellitus    Chronic atrial fibrillation    H/O: hysterectomy  indication: uterine cancer        FAMILY HISTORY:  No pertinent family history in first degree relatives        SOCIAL HISTORY:  Smoking: [  ] Never Smoked  [  ] Former Smoker (# packs x # years)  [  ] Current Smoker (# packs x # years)  Substance Use:   EtOH Use:   Marital Status: [  ] Single  [  ]   [  ]   [  ]   Sexual History:   Occupation:  Recent Travel:  Country of Birth:   Advance Directives:     HOME MEDICATIONS:      Allergies    No Known Allergies    Intolerances    Unable to perform ROS due to mental status     OBJECTIVE:  ICU Vital Signs Last 24 Hrs  T(C): 37.4 (20 Apr 2022 09:15), Max: 37.4 (20 Apr 2022 09:15)  T(F): 99.3 (20 Apr 2022 09:15), Max: 99.3 (20 Apr 2022 09:15)  HR: 69 (20 Apr 2022 09:36) (60 - 90)  BP: 110/73 (20 Apr 2022 09:15) (100/66 - 173/87)  BP(mean): --  ABP: --  ABP(mean): --  RR: 18 (20 Apr 2022 09:36) (16 - 20)  SpO2: 100% (20 Apr 2022 09:36) (90% - 100%)        04-19 @ 07:01  -  04-20 @ 07:00  --------------------------------------------------------  IN: 1742 mL / OUT: 1501 mL / NET: 241 mL    04-20 @ 07:01 - 04-20 @ 12:54  --------------------------------------------------------  IN: 225 mL / OUT: 0 mL / NET: 225 mL      CAPILLARY BLOOD GLUCOSE      POCT Blood Glucose.: 95 mg/dL (20 Apr 2022 12:17)      PHYSICAL EXAM:  General: Intubated, tracking  HEENT: No icterus or conjunctival injection   Neck: Supple   Chest/Lungs: Bilateral breath sounds with scant rales, no wheezing or rhonchi  Heart: Normal s1 and s2 w/o murmur, rub or gallop   Abdomen: NT, ND, Soft, No hepatomegaly   Extremities: Cool distally, Trace edema, No cyanosis   Skin: Cool, dry, No obvious rashes   Neuro: Alert and Oriented x0, non-verbal, eyes opening spontaneously   Psych: Unable to be assessed     LINES:     HOSPITAL MEDICATIONS:  MEDICATIONS  (STANDING):  allopurinol 300 milliGRAM(s) Oral daily  benzonatate 100 milliGRAM(s) Oral every 8 hours  Biotene Dry Mouth Oral Rinse 15 milliLiter(s) Swish and Spit three times a day  chlorhexidine 4% Liquid 1 Application(s) Topical <User Schedule>  dextrose 5%. 1000 milliLiter(s) (50 mL/Hr) IV Continuous <Continuous>  dextrose 5%. 1000 milliLiter(s) (100 mL/Hr) IV Continuous <Continuous>  dextrose 50% Injectable 25 Gram(s) IV Push once  dextrose 50% Injectable 12.5 Gram(s) IV Push once  dextrose 50% Injectable 25 Gram(s) IV Push once  famotidine    Tablet 20 milliGRAM(s) Oral daily  furosemide   Injectable 40 milliGRAM(s) IV Push once  glucagon  Injectable 1 milliGRAM(s) IntraMuscular once  insulin glargine Injectable (LANTUS) 10 Unit(s) SubCutaneous at bedtime  insulin lispro (ADMELOG) corrective regimen sliding scale   SubCutaneous three times a day before meals  insulin lispro (ADMELOG) corrective regimen sliding scale   SubCutaneous at bedtime  insulin lispro Injectable (ADMELOG) 2 Unit(s) SubCutaneous three times a day before meals  levoFLOXacin  Tablet 250 milliGRAM(s) Oral every 24 hours  metoprolol tartrate 50 milliGRAM(s) Oral two times a day  phytonadione   Solution 5 milliGRAM(s) Oral daily  posaconazole DR Tablet 300 milliGRAM(s) Oral daily  sodium chloride 0.9%. 1000 milliLiter(s) (25 mL/Hr) IV Continuous <Continuous>  venetoclax 100 milliGRAM(s) Oral <User Schedule>  verapamil  milliGRAM(s) Oral daily    MEDICATIONS  (PRN):  acetaminophen     Tablet .. 650 milliGRAM(s) Oral every 6 hours PRN Temp greater or equal to 38C (100.4F), Mild Pain (1 - 3)  aluminum hydroxide/magnesium hydroxide/simethicone Suspension 30 milliLiter(s) Oral every 4 hours PRN Dyspepsia  dextrose Oral Gel 15 Gram(s) Oral once PRN Blood Glucose LESS THAN 70 milliGRAM(s)/deciliter      LABS:                        7.7    1.04  )-----------( 95       ( 20 Apr 2022 11:02 )             23.7     Hgb Trend: 7.7<--, 7.9<--, 9.2<--, 8.7<--, 8.1<--  04-20    136  |  103  |  19  ----------------------------<  143<H>  4.2   |  23  |  0.77    Ca    7.1<L>      20 Apr 2022 06:56  Phos  2.8     04-20  Mg     1.9     04-20    TPro  6.0  /  Alb  2.8<L>  /  TBili  0.8  /  DBili  x   /  AST  33  /  ALT  28  /  AlkPhos  63  04-20    Creatinine Trend: 0.77<--, 0.78<--, 0.73<--, 0.78<--, 0.74<--, 0.73<--  PT/INR - ( 19 Apr 2022 13:57 )   PT: 15.3 sec;   INR: 1.32 ratio         PTT - ( 20 Apr 2022 03:26 )  PTT:74.3 sec      Venous Blood Gas:  04-20 @ 00:45  7.31/37/31/19/51.3  VBG Lactate: 2.3      RADIOLOGY & ADDITIONAL TESTS: reviewed    Assessment and Plan:  89F w/ T2DM, HTN who initially presented to the [facility] ED for palpitations x 3 days and weakness x 1 day., found to be in a-fib w/ RVR. Labs significant for + with 88% blasts concerning for acute leukemia, admitted to MICU for urgent leukaphereses, s/p  CHIOMA bro and leukapheresis with improvement in counts, flow cytometry showed AML, bone marrow bx confirmed AML. Admitted to MICU s/p ROSC after cardiac arrest.    Neuro  -Intubated, off sedation and no purposeful movements  -CTM neurological status    CV  #cardiac arrest  On 4/20, had asystole leading to PEA, had 4 rounds of CPR with 5 rounds epi with 2 bicarb and 2 Ca. ROSC. then bradycardic, levo started  -Pt is now DNR per family's wishes, CHARLIEST in chart    #afib  New afib noted this admission, CHADSVASC score 5, previously on heparin gtt for AC  -On SCDs, no AC given thrombocytopenia, no DVT  -Previously rate controlled with metoprolol 50mg bid, bradycardic currently    Resp  -Intubated s/p cardiac arrest for airway protection  -Hyperventilation on vent settings given metabolic lactic acidosis  -Diuresis with lasix, hypoxic respiratory failure  -Hx of subsegmental PE, previously on heparin gtt, stopped due to Hg drop 9 to 7 and worsening thrombocytopenia    #pneumothorax  -noted 4/20, iatrogenic from attempted OGT placement, chest tube placed  -incomplete reexpansion    GI  #Transaminitis  Noted after cardiac arrest, most likely 2/2 hypotension  CTM    -NPO    /renal  -Molina placed, monitor UOP    ID  #Pancytopenia  previously on levaquin and pasconazole for prophylaxis  -LLL consolidation noted on bedside US, started on vanc and zosyn 4/20    Endo  #T2DM  -low ISS    Heme/Onc  #AML  Initially presented with leukocytosis up to 270, 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 She felt very weak and lightheaded while standing today, feeling like she would pass out so this prompted her to come to the ED. Recently she also had a cough x 3 weeks. She was seen by PMD who felt this was likely a URI. Pt continued to have cough with weakness and HR in 140s. Pt denies any fever, chills, SOB, chest pain, or and pain. She did not take her medications this morning because she was feeling unwell. She also does not take her metformin regularly.      In [facility] ED, pt in a-fib w/ RVR HR 160s, saturating well on RA, BP hypertensive, RR 14. Labs significant for + with 88% blasts concerning for acute leukemia. Heme consulted in ED, patient transferred to MICU for urgent leukaphereses. Received RIJ shiley and leukapheresis with improvement in counts. Flow cytometry showed AML. Bone marrow bx done on 4/13. Symptoms improved, transferred to medicine oncology floor for further management.    Given tretinoin 40mg q12h and dexamethasone 10mg q12 4/13-4/14. BM Bx sent 4/13. Given 1unit platlet for thrombocytopenia to 38. Started on decitabine and venetoclax 4/15, held 4/16, and restarted 4/18.    RRT on 4/19 ovn for respiratory distress, chest x ray showing congestion, placed on bipap and given diuretics with improvement. Of note, patient has a subsegmental pulmonary artery embolus in RUL, was on heparin gtt for AC but dc'ed due to thrombocytopenia. Concern for TLS, received rasburicase.    Cardiac arrested on floors, admitted for MICU for further management.      PAST MEDICAL & SURGICAL HISTORY:  H/O: HTN (hypertension)    Diabetes mellitus    Chronic atrial fibrillation    H/O: hysterectomy  indication: uterine cancer        FAMILY HISTORY:  No pertinent family history in first degree relatives        SOCIAL HISTORY:  Smoking: [  ] Never Smoked  [  ] Former Smoker (# packs x # years)  [  ] Current Smoker (# packs x # years)  Substance Use:   EtOH Use:   Marital Status: [  ] Single  [  ]   [  ]   [  ]   Sexual History:   Occupation:  Recent Travel:  Country of Birth:   Advance Directives:     HOME MEDICATIONS:      Allergies    No Known Allergies    Intolerances    Unable to perform ROS due to mental status     OBJECTIVE:  ICU Vital Signs Last 24 Hrs  T(C): 37.4 (20 Apr 2022 09:15), Max: 37.4 (20 Apr 2022 09:15)  T(F): 99.3 (20 Apr 2022 09:15), Max: 99.3 (20 Apr 2022 09:15)  HR: 69 (20 Apr 2022 09:36) (60 - 90)  BP: 110/73 (20 Apr 2022 09:15) (100/66 - 173/87)  BP(mean): --  ABP: --  ABP(mean): --  RR: 18 (20 Apr 2022 09:36) (16 - 20)  SpO2: 100% (20 Apr 2022 09:36) (90% - 100%)        04-19 @ 07:01  -  04-20 @ 07:00  --------------------------------------------------------  IN: 1742 mL / OUT: 1501 mL / NET: 241 mL    04-20 @ 07:01 - 04-20 @ 12:54  --------------------------------------------------------  IN: 225 mL / OUT: 0 mL / NET: 225 mL      CAPILLARY BLOOD GLUCOSE      POCT Blood Glucose.: 95 mg/dL (20 Apr 2022 12:17)      PHYSICAL EXAM:  General: Intubated, tracking  HEENT: No icterus or conjunctival injection   Neck: Supple   Chest/Lungs: Bilateral breath sounds with scant rales, no wheezing or rhonchi  Heart: Normal s1 and s2 w/o murmur, rub or gallop   Abdomen: NT, ND, Soft, No hepatomegaly   Extremities: Cool distally, Trace edema, No cyanosis   Skin: Cool, dry, No obvious rashes   Neuro: Alert and Oriented x0, non-verbal, eyes opening spontaneously   Psych: Unable to be assessed     LINES:     HOSPITAL MEDICATIONS:  MEDICATIONS  (STANDING):  allopurinol 300 milliGRAM(s) Oral daily  benzonatate 100 milliGRAM(s) Oral every 8 hours  Biotene Dry Mouth Oral Rinse 15 milliLiter(s) Swish and Spit three times a day  chlorhexidine 4% Liquid 1 Application(s) Topical <User Schedule>  dextrose 5%. 1000 milliLiter(s) (50 mL/Hr) IV Continuous <Continuous>  dextrose 5%. 1000 milliLiter(s) (100 mL/Hr) IV Continuous <Continuous>  dextrose 50% Injectable 25 Gram(s) IV Push once  dextrose 50% Injectable 12.5 Gram(s) IV Push once  dextrose 50% Injectable 25 Gram(s) IV Push once  famotidine    Tablet 20 milliGRAM(s) Oral daily  furosemide   Injectable 40 milliGRAM(s) IV Push once  glucagon  Injectable 1 milliGRAM(s) IntraMuscular once  insulin glargine Injectable (LANTUS) 10 Unit(s) SubCutaneous at bedtime  insulin lispro (ADMELOG) corrective regimen sliding scale   SubCutaneous three times a day before meals  insulin lispro (ADMELOG) corrective regimen sliding scale   SubCutaneous at bedtime  insulin lispro Injectable (ADMELOG) 2 Unit(s) SubCutaneous three times a day before meals  levoFLOXacin  Tablet 250 milliGRAM(s) Oral every 24 hours  metoprolol tartrate 50 milliGRAM(s) Oral two times a day  phytonadione   Solution 5 milliGRAM(s) Oral daily  posaconazole DR Tablet 300 milliGRAM(s) Oral daily  sodium chloride 0.9%. 1000 milliLiter(s) (25 mL/Hr) IV Continuous <Continuous>  venetoclax 100 milliGRAM(s) Oral <User Schedule>  verapamil  milliGRAM(s) Oral daily    MEDICATIONS  (PRN):  acetaminophen     Tablet .. 650 milliGRAM(s) Oral every 6 hours PRN Temp greater or equal to 38C (100.4F), Mild Pain (1 - 3)  aluminum hydroxide/magnesium hydroxide/simethicone Suspension 30 milliLiter(s) Oral every 4 hours PRN Dyspepsia  dextrose Oral Gel 15 Gram(s) Oral once PRN Blood Glucose LESS THAN 70 milliGRAM(s)/deciliter      LABS:                        7.7    1.04  )-----------( 95       ( 20 Apr 2022 11:02 )             23.7     Hgb Trend: 7.7<--, 7.9<--, 9.2<--, 8.7<--, 8.1<--  04-20    136  |  103  |  19  ----------------------------<  143<H>  4.2   |  23  |  0.77    Ca    7.1<L>      20 Apr 2022 06:56  Phos  2.8     04-20  Mg     1.9     04-20    TPro  6.0  /  Alb  2.8<L>  /  TBili  0.8  /  DBili  x   /  AST  33  /  ALT  28  /  AlkPhos  63  04-20    Creatinine Trend: 0.77<--, 0.78<--, 0.73<--, 0.78<--, 0.74<--, 0.73<--  PT/INR - ( 19 Apr 2022 13:57 )   PT: 15.3 sec;   INR: 1.32 ratio         PTT - ( 20 Apr 2022 03:26 )  PTT:74.3 sec      Venous Blood Gas:  04-20 @ 00:45  7.31/37/31/19/51.3  VBG Lactate: 2.3      RADIOLOGY & ADDITIONAL TESTS: reviewed    Assessment and Plan:  89F w/ T2DM, HTN who initially presented to the [facility] ED for palpitations x 3 days and weakness x 1 day., found to be in a-fib w/ RVR. Labs significant for + with 88% blasts concerning for acute leukemia, admitted to MICU for urgent leukaphereses, s/p  CHIOMA bro and leukapheresis with improvement in counts, flow cytometry showed AML, bone marrow bx confirmed AML. Admitted to MICU s/p ROSC after cardiac arrest.    Neuro  -Intubated, off sedation and no purposeful movements  -CTM neurological status    CV  #cardiac arrest  On 4/20, had asystole leading to PEA, had 4 rounds of CPR with 5 rounds epi with 2 bicarb and 2 Ca. ROSC. then bradycardic, levo started  -Pt is now DNR per family's wishes, CHARLIEST in chart    #afib  New afib noted this admission, CHADSVASC score 5, previously on heparin gtt for AC  -On SCDs, no AC given thrombocytopenia, no DVT  -Previously rate controlled with metoprolol 50mg bid, bradycardic currently    Resp  -Intubated s/p cardiac arrest for airway protection  -Hyperventilation on vent settings given metabolic lactic acidosis  -Diuresis with lasix, hypoxic respiratory failure  -Hx of subsegmental PE, previously on heparin gtt, stopped due to Hg drop 9 to 7 and worsening thrombocytopenia    #pneumothorax  -noted 4/20, iatrogenic from attempted OGT placement, chest tube placed  -incomplete reexpansion    GI  #Transaminitis  Noted after cardiac arrest, most likely 2/2 hypotension  CTM    -NPO    /renal  -Molina placed, monitor UOP    ID  #Pancytopenia  previously on levaquin and pasconazole for prophylaxis  -LLL consolidation noted on bedside US, started on vanc and zosyn 4/20    Endo  #T2DM  -low ISS    Heme/Onc  #AML  Initially presented with leukocytosis up to 270, 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 S/p leukopheresisx1, tretinoin, dexamethasone, and on decitabine and venetoclax  -Replete if Plt >10k or > 15k if febrile or > 50k if bleeding  -Appreciate onc recs    #TLS  TLS labs positive 4/12/22, given rasburicase  -f/u TLS labs daily    Ethics  -DNR, MOLST in chart

## 2022-04-20 NOTE — DIETITIAN INITIAL EVALUATION ADULT - REASON FOR ADMISSION
"This is an 89F w/ T2DM, HTN who initially presented to the [facility] ED for palpitations x 3 days and weakness x 1 day. In [facility] ED, pt in a-fib w/ RVR HR 160s, saturating well on RA, BP hypertensive, RR 14. Labs significant for + with 88% blasts concerning for acute leukemia. Heme consulted in ED, patient transferred to MICU for urgent leukaphereses, s/p  CHIOMA bro and leukapheresis with improvement in counts, flow cytometry showed AML, bone marrow bx confirmed AML, on Dacogen, held Venetoclax on Day 2, (received one dose)   Patient has pancytopenia secondary to disease condition."

## 2022-04-20 NOTE — PROGRESS NOTE ADULT - ASSESSMENT
This is an 89F w/ T2DM, HTN who initially presented to the [facility] ED for palpitations x 3 days and weakness x 1 day. In [facility] ED, pt in a-fib w/ RVR HR 160s, saturating well on RA, BP hypertensive, RR 14. Labs significant for + with 88% blasts concerning for acute leukemia. 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       This is an 89F w/ T2DM, HTN who initially presented to the [facility] ED for palpitations x 3 days and weakness x 1 day. In [facility] ED, pt in a-fib w/ RVR HR 160s, saturating well on RA, BP hypertensive, RR 14. Labs significant for + with 88% blasts concerning for acute leukemia. Heme consulted in ED, patient transferred to MICU for urgent leukaphereses, s/p  RIJ shiley and leukapheresis with improvement in counts, flow cytometry showed AML, bone marrow bx confirmed AML, on Dacogen, held Venetoclax on Day 2, (received one dose)   Patient has pancytopenia secondary to disease condition. Overnight became hypoxic found to be fluid overloaded on exam and cxr, lasix lasix IV and placed on bipap.

## 2022-04-20 NOTE — DIETITIAN INITIAL EVALUATION ADULT - ADD RECOMMEND
1) Continue current diet order; consistent carbohydrate diet with evening snack as ordered/ tolerated. 2) Continue supplement: Glucerna 2x/day. 3) Encourage adequate intake of meals and supplements to optimize PO intake. 4) Monitor PO intake/tolerance, weights, labs, hydration status, bowels, and skin integrity.  1) If PO diet remains indicated: Continue consistent carbohydrate diet with evening snack, add Glucerna 2x/day.  2) If PO diet contraindicated, consider alternate routes of nutrition if within GOC.   3) Encourage adequate intake of meals and supplements to optimize PO intake.   4) Monitor PO intake/tolerance, weights, labs, hydration status, bowels, and skin integrity.

## 2022-04-20 NOTE — DIETITIAN INITIAL EVALUATION ADULT - REASON INDICATOR FOR ASSESSMENT
Nutrition assessment warranted for: length of stay on [facility].   Information obtained from: medical record.  Nutrition assessment warranted for: length of stay on [facility], new diagnosis AML  Information obtained from: medical record.

## 2022-04-20 NOTE — DIETITIAN INITIAL EVALUATION ADULT - PERTINENT MEDS FT
MEDICATIONS  (STANDING):  allopurinol 300 milliGRAM(s) Oral daily  benzonatate 100 milliGRAM(s) Oral every 8 hours  Biotene Dry Mouth Oral Rinse 15 milliLiter(s) Swish and Spit three times a day  chlorhexidine 4% Liquid 1 Application(s) Topical <User Schedule>  dextrose 5%. 1000 milliLiter(s) (50 mL/Hr) IV Continuous <Continuous>  dextrose 5%. 1000 milliLiter(s) (100 mL/Hr) IV Continuous <Continuous>  dextrose 50% Injectable 25 Gram(s) IV Push once  dextrose 50% Injectable 12.5 Gram(s) IV Push once  dextrose 50% Injectable 25 Gram(s) IV Push once  famotidine    Tablet 20 milliGRAM(s) Oral daily  furosemide   Injectable 40 milliGRAM(s) IV Push once  glucagon  Injectable 1 milliGRAM(s) IntraMuscular once  insulin glargine Injectable (LANTUS) 10 Unit(s) SubCutaneous at bedtime  insulin lispro (ADMELOG) corrective regimen sliding scale   SubCutaneous three times a day before meals  insulin lispro (ADMELOG) corrective regimen sliding scale   SubCutaneous at bedtime  insulin lispro Injectable (ADMELOG) 2 Unit(s) SubCutaneous three times a day before meals  levoFLOXacin  Tablet 250 milliGRAM(s) Oral every 24 hours  metoprolol tartrate 50 milliGRAM(s) Oral two times a day  phytonadione   Solution 5 milliGRAM(s) Oral daily  posaconazole DR Tablet 300 milliGRAM(s) Oral daily  sodium chloride 0.9%. 1000 milliLiter(s) (25 mL/Hr) IV Continuous <Continuous>  venetoclax 100 milliGRAM(s) Oral <User Schedule>  verapamil  milliGRAM(s) Oral daily    MEDICATIONS  (PRN):  acetaminophen     Tablet .. 650 milliGRAM(s) Oral every 6 hours PRN Temp greater or equal to 38C (100.4F), Mild Pain (1 - 3)  aluminum hydroxide/magnesium hydroxide/simethicone Suspension 30 milliLiter(s) Oral every 4 hours PRN Dyspepsia  dextrose Oral Gel 15 Gram(s) Oral once PRN Blood Glucose LESS THAN 70 milliGRAM(s)/deciliter

## 2022-04-20 NOTE — PROGRESS NOTE ADULT - PROBLEM SELECTOR PROBLEM 2
Pulmonary thromboembolism

## 2022-04-20 NOTE — PROVIDER CONTACT NOTE (OTHER) - DATE AND TIME:
14-Apr-2022 16:17
20-Apr-2022 23:35
15-Apr-2022 14:30
17-Apr-2022 01:25
17-Apr-2022 05:04
17-Apr-2022 11:47
18-Apr-2022 21:22
18-Apr-2022 22:30
19-Apr-2022 20:34
19-Apr-2022 21:20
16-Apr-2022 09:09
20-Apr-2022 05:17

## 2022-04-20 NOTE — PROGRESS NOTE ADULT - PROBLEM SELECTOR PLAN 8
Encourage ambulation, continue Heparin drip for PE. Encourage ambulation, heparin drip currently on hold for plts <40.

## 2022-04-20 NOTE — DISCHARGE NOTE FOR THE EXPIRED PATIENT - HOSPITAL COURSE
89F w/ T2DM, HTN who initially presented to the [facility] ED for palpitations x 3 days and weakness x 1 day. She felt very weak and lightheaded while standing today, feeling like she would pass out so this prompted her to come to the ED. Recently she also had a cough x 3 weeks. She was seen by PMD who felt this was likely a URI. Pt continued to have cough with weakness and HR in 140s. Pt denies any fever, chills, SOB, chest pain, or and pain. She did not take her medications this morning because she was feeling unwell. She also does not take her metformin regularly.      In [facility] ED, pt in a-fib w/ RVR HR 160s, saturating well on RA, BP hypertensive, RR 14. Labs significant for + with 88% blasts concerning for acute leukemia. Heme consulted in ED, patient transferred to MICU for urgent leukaphereses. Received RIJ shiley and leukapheresis with improvement in counts. Flow cytometry showed AML. Bone marrow bx done on . Symptoms improved, transferred to medicine oncology floor for further management.    Given tretinoin 40mg q12h and dexamethasone 10mg q12 -. BM Bx sent . Given 1unit platlet for thrombocytopenia to 38. Started on decitabine and venetoclax 4/15, held , and restarted .    RRT on  ovn for respiratory distress, chest x ray showing congestion, placed on bipap and given diuretics with improvement. Of note, patient has a subsegmental pulmonary artery embolus in RUL, was on heparin gtt for AC but dc'ed due to thrombocytopenia. Concern for TLS, received rasburicase.    Cardiac arrested on floors, admitted for MICU for further management. Started on levophed and vasopressin for pressure support. Pt had pneumothorax, chest tube placed. Given bicarb infusion for metabolic acidosis. Pt  , 4:40pm.

## 2022-04-20 NOTE — AIRWAY REMOVAL NOTE  ADULT & PEDS - ARTIFICAL AIRWAY REMOVAL COMMENTS
Written order for extubation verified. The patient was identified by full name and birth date compared to the identification band. Present during the procedure was Bull Azevedo RRT & Riley Morejon RN

## 2022-04-20 NOTE — PROGRESS NOTE ADULT - NS ATTEND AMEND GEN_ALL_CORE FT
88 yo female with MHx sig for T2DM, HTN  here with newly diagnosed AML. She initially presented to PMD with severe weakness, unable to walk, WBC count > 200k. 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 Initial presentation complicated by tumor lysis syndrome, now s/p leukapheresis, rasburicase.  [facility] sent -- PENDING.  Plan for Decitabine + venetoclax (dose adjusted for posa), held venetoclax since 4/16, restarted 4/18/22  Today is Day 5    - Feeling well, asymptomatic hypoglycemia to 60s  - CT chest w contrast 4/12/22: Acute subsegmental pulmonary artery embolus in the right upper lobe. ?right heart strain, patchy bilateral upper lobe opacities may be infectious or inflammatory.  - VTE / PE: Likely exacerbated by severely elevated WBC, now on heparin drip, monitor coags / PTT closely,cont heparin, keep PTT around 60  tx plts to keep over 40 so that heparin can be maintained for at least two wks up to 4/28/22, US LE negative for DVT  - TTE with normal LVEF, ? new Afib with episodes of RVR, now on verapamil, follow-up oral DOAC when plts consistently > 50k with treatment of AML  - TLS: Would increase IVF to 75 cc/hr given TLS, s/p rasburicase on 4/12/22, TLS labs once a day  - Thrombocytopenia: Transfuse to keep Plt >10k or > 15k if febrile or > 50k if bleeding  - Anemia: Transfuse to maintain Hb > 7.0   - Neutropenic ppx: when ANC < 1000 start levaquin/ posa  - Micaela-anal wound stage 2 (developed at home), wound care  - Maintain good oral hygiene, OOB to chair  - PT eval here 89 yo female with MHx sig for T2DM, HTN  here with newly diagnosed AML. She initially presented to PMD with severe weakness, unable to walk, WBC count > 200k. 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 Initial presentation complicated by tumor lysis syndrome, now s/p leukapheresis, rasburicase.  [facility] sent -- PENDING.  Plan for Decitabine + venetoclax (dose adjusted for posa), held venetoclax since 4/16, restarted 4/18/22  Today is Day 6    - RRT overnight, respiratory distress, CXR with congestion, placed on bipap additional diuretics with improvement.  - CT chest w contrast 4/12/22: Acute subsegmental pulmonary artery embolus in the right upper lobe. ?right heart strain, patchy bilateral upper lobe opacities may be infectious or inflammatory.  - VTE / PE: Likely exacerbated by severely elevated WBC, now on heparin drip, monitor coags / PTT closely, cont heparin, keep PTT around 60  tx plts to keep over 40 so that heparin can be maintained for at least two wks up to 4/28/22, US LE negative for DVT, check CBC and aPTT every 6 hours for now until stabilization of plts  - TTE with normal LVEF, ? 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 She initially presented to PMD with severe weakness, unable to walk, WBC count > 200k. 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 Initial presentation complicated by tumor lysis syndrome, now s/p leukapheresis, rasburicase.  [facility] sent -- PENDING.  Plan for Decitabine + venetoclax (dose adjusted for posa), held venetoclax since 4/16, restarted 4/18/22  Today is Day 6    - Around noon on 4/20, patient went found unresponsive during my exam, was on bipap, could not detect heart beat or pulse, PEA observed on monitor, code called with ROSC within 20 min, to go to ICU, discussed with patient's grand-daughter (Radha)  - RRT overnight 4/20, respiratory distress, CXR with pulmonary congestion, placed on bipap additional diuretics with some stabilization.  - CT chest w contrast 4/12/22: Acute subsegmental pulmonary artery embolus in the right upper lobe. ?right heart strain, patchy bilateral upper lobe opacities may be infectious or inflammatory.  - VTE / PE: Likely exacerbated by severely elevated WBC, now on heparin drip, monitor coags / PTT closely, cont heparin, keep PTT around 60  tx plts to keep over 40 so that heparin can be maintained for at least two wks up to 4/28/22, US LE negative for DVT, check CBC and aPTT every 6 hours for now until stabilization of plts  - TTE with normal LVEF, ? 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

## 2022-04-20 NOTE — PROGRESS NOTE ADULT - PROBLEM SELECTOR PLAN 10
PT - 17.2, Vitamin K PO x 3 doses on 4/18, 19,20.

## 2022-04-20 NOTE — DIETITIAN INITIAL EVALUATION ADULT - ENERGY INTAKE
Pt unbale to provide any nutritional information at this time. Breakfast tray observed on table, 0% consumed. Per flow sheets, variable PO intake since admission.  Pt unable to provide any nutritional information at this time. Breakfast tray observed on table, 0% consumed. Per flow sheets, variable PO intake since admission. Pt is currently on a consistent carbohydrate with evening snack diet, ordered for Glucerna 2x/day.     Endocrine: Finger sticks  on 4/20, will continue to monitor blood glucose levels as able.

## 2022-04-20 NOTE — PROGRESS NOTE ADULT - PROBLEM SELECTOR PLAN 4
Continue Verapamil.

## 2022-04-20 NOTE — CHART NOTE - NSCHARTNOTEFT_GEN_A_CORE
Called by bedside by nurse for MARIAELENA BAER    On physical exam, no spontaneous movements were present. Patient did not respond to verbal or physical stimuli. Pupils were mid-dilated and fixed. No breath sounds were appreciated over either lung field. No carotid pulses were palpable. No heart sounds were auscultated.   Patient pronounced dead at 4/20/22 4:47pm. Attending Dr. Kristen Akhtar notified.  Family was notified. Family declined autopsy.

## 2022-04-20 NOTE — PROVIDER CONTACT NOTE (OTHER) - ACTION/TREATMENT ORDERED:
Hydroxyzine ordered and given. Provider to see her. Hydroxyzine ordered and given. Lantus dose held. Provider to see her.

## 2022-04-20 NOTE — PROVIDER CONTACT NOTE (OTHER) - BACKGROUND
Hx ALL
ALL
AML
Hx ALL receiving chemotherapy
AML
Hx ALL day 2 dacogen
AML
pt admitted with ALL
ALL

## 2022-04-20 NOTE — PROGRESS NOTE ADULT - PROBLEM SELECTOR PROBLEM 1
AML (acute myeloid leukemia)

## 2022-04-20 NOTE — PROGRESS NOTE ADULT - SUBJECTIVE AND OBJECTIVE BOX
INTERVAL HPI/OVERNIGHT EVENTS: transferred post cardiac arrest    SUBJECTIVE: Patient seen and examined at bedside. Intubated      VITAL SIGNS:  ICU Vital Signs Last 24 Hrs  T(C): 37 (20 Apr 2022 13:09), Max: 37.4 (20 Apr 2022 09:15)  T(F): 98.6 (20 Apr 2022 13:09), Max: 99.3 (20 Apr 2022 09:15)  HR: 0 (20 Apr 2022 16:47) (0 - 90)  BP: 0/0 (20 Apr 2022 16:47) (0/0 - 173/87)  BP(mean): 0 (20 Apr 2022 16:47) (0 - 105)  ABP: --  ABP(mean): --  RR: 0 (20 Apr 2022 16:47) (0 - 35)  SpO2: 94% (20 Apr 2022 16:30) (51% - 100%)    Mode: AC/ CMV (Assist Control/ Continuous Mandatory Ventilation), RR (machine): 26, TV (machine): 320, FiO2: 100, PEEP: 5, ITime: 1, MAP: 11, PIP: 32  Plateau pressure:   P/F ratio:     04-19 @ 07:01  -  04-20 @ 07:00  --------------------------------------------------------  IN: 1742 mL / OUT: 1501 mL / NET: 241 mL    04-20 @ 07:01  -  04-20 @ 20:58  --------------------------------------------------------  IN: 433 mL / OUT: 150 mL / NET: 283 mL      CAPILLARY BLOOD GLUCOSE      POCT Blood Glucose.: 95 mg/dL (20 Apr 2022 12:17)    ECG:    PHYSICAL EXAM:    General: Intubated, tracking  HEENT: No icterus or conjunctival injection   Neck: Supple   Chest/Lungs: Bilateral breath sounds with scant rales, no wheezing or rhonchi  Heart: Normal s1 and s2 w/o murmur, rub or gallop   Abdomen: NT, ND, Soft, No hepatomegaly   Extremities: Cool distally, Trace edema, No cyanosis   Skin: Cool, dry, No obvious rashes   Neuro: Alert and Oriented x0, non-verbal, eyes opening spontaneously   Psych: Unable to be assessed     MEDICATIONS:  MEDICATIONS  (STANDING):  allopurinol 300 milliGRAM(s) Oral daily  chlorhexidine 0.12% Liquid 15 milliLiter(s) Oral Mucosa every 12 hours  chlorhexidine 4% Liquid 1 Application(s) Topical <User Schedule>  chlorhexidine 4% Liquid 1 Application(s) Topical <User Schedule>  dextrose 5%. 1000 milliLiter(s) (50 mL/Hr) IV Continuous <Continuous>  dextrose 5%. 1000 milliLiter(s) (100 mL/Hr) IV Continuous <Continuous>  dextrose 50% Injectable 25 Gram(s) IV Push once  dextrose 50% Injectable 12.5 Gram(s) IV Push once  dextrose 50% Injectable 25 Gram(s) IV Push once  glucagon  Injectable 1 milliGRAM(s) IntraMuscular once  insulin lispro (ADMELOG) corrective regimen sliding scale   SubCutaneous every 6 hours  norepinephrine Infusion 0.05 MICROgram(s)/kG/Min (7.39 mL/Hr) IV Continuous <Continuous>  piperacillin/tazobactam IVPB. 3.375 Gram(s) IV Intermittent once  propofol Infusion 30 MICROgram(s)/kG/Min (14.2 mL/Hr) IV Continuous <Continuous>  sodium bicarbonate  Infusion 0.095 mEq/kG/Hr (50 mL/Hr) IV Continuous <Continuous>  sodium chloride 0.9%. 1000 milliLiter(s) (25 mL/Hr) IV Continuous <Continuous>  vancomycin  IVPB 1000 milliGRAM(s) IV Intermittent once  vasopressin Infusion 0.04 Unit(s)/Min (2.4 mL/Hr) IV Continuous <Continuous>  venetoclax 100 milliGRAM(s) Oral <User Schedule>    MEDICATIONS  (PRN):  dextrose Oral Gel 15 Gram(s) Oral once PRN Blood Glucose LESS THAN 70 milliGRAM(s)/deciliter      ALLERGIES:  Allergies    No Known Allergies    Intolerances        LABS:                        7.7    1.41  )-----------( 70       ( 20 Apr 2022 13:39 )             24.6     04-20    145  |  104  |  23  ----------------------------<  63<L>  4.8   |  12<L>  |  1.06    Ca    12.8<H>      20 Apr 2022 13:39  Phos  8.7     04-20  Mg     2.6     04-20    TPro  5.5<L>  /  Alb  2.4<L>  /  TBili  1.2  /  DBili  x   /  AST  440<H>  /  ALT  253<H>  /  AlkPhos  81  04-20    PT/INR - ( 20 Apr 2022 13:39 )   PT: 15.8 sec;   INR: 1.37 ratio         PTT - ( 20 Apr 2022 13:39 )  PTT:25.1 sec      RADIOLOGY & ADDITIONAL TESTS: Reviewed.

## 2022-04-20 NOTE — PROCEDURE NOTE - ATTENDING PROVIDER
Pt does not need home 02 and is on room air today  MD has discharge pt today  [facility] was set up for Thurs because of the possible home  02 study for today  TC to [facility] and they can not see pt until tomorrow  MD, RN and spouse made aware  Spouse stated she did not get phone message yesterday and wants to take pt home today with different [facility]  [facility] case and spouse is agreeable  Changed discharge instruction sheets with face to face to [facility]  Fax discharge instruction sheets to [facility]  RN and MD aware new VNA  [place]

## 2022-04-20 NOTE — DIETITIAN INITIAL EVALUATION ADULT - NS FNS WEIGHT CHANGE REASON
Dosing Weight: 78.8 kg on 4/15  Bed scale weights: 80.1 kg on 4/19, 79.3 kg on 4/16.   [facility] 76.2 kg on 7/27/2021.  Weights fluctuating likely in the setting of fluid shifts. Will continue to monitor as able.

## 2022-04-20 NOTE — CHART NOTE - NSCHARTNOTEFT_GEN_A_CORE
MEDICINE PA EPISODIC NOTE    Notified by RN of pt. becoming increasingly SOB, and hypoxic. RRT called for symptoms. During RRT, STAT labs drawn, IV ABx initiated, pt. placed on BIPAP stating she feels better. Please refer to RRT sheet for more details. Will continue to monitor closely. Pt.'s family called multiple times w/ no answer. Will endorse above information to overnight heme/onc fellow. Will endorse to day team in AM.    Brandan Rose PA-C  Dept. of Medicine  Spectra [phone]

## 2022-04-20 NOTE — DIETITIAN INITIAL EVALUATION ADULT - NS FNS DIET ORDER
Diet, Consistent Carbohydrate w/Evening Snack:   Supplement Feeding Modality:  Oral  Glucerna Shake Cans or Servings Per Day:  2       Frequency:  Daily (04-14-22 @ 14:13) [Active]

## 2022-04-20 NOTE — PROGRESS NOTE ADULT - ATTENDING COMMENTS
Ms. Griffith is an 88 yo F with PMHx DM, HTN, obesity admitted with new onset AFib with RVR, newly diagnosed AML s/p leukapheresis s/p chemotherapy admitted to MICU after PEA arrest on the floor - last seen awake/alert 20min prior to BEBE walking in to find patient unresponsive/pulseless - underwent 5 rounds of ACLS before ROSC for PEA arrest.  Initially moving all 4 extremities spontaneously but not purposefully.  Arrived to MICU hypoxic with right mainstem intubation, severe shock due to presumed sepsis with significant metabolic acidosis due to LA. Decision made by HCP (daughter Cheyenne, in agreement with sister Lou) to make patient DNR. Subsequently developed right PTX, underwent right chest tube placement with incomplete reexpansion.  Persistent hypoxemic resp failure on full ventilator support and progressive hemodyanic collapse on maximal vasopressor support leading to bradycardia and asystole.  Family support provided and  visited with family per their request.

## 2022-04-20 NOTE — PROGRESS NOTE ADULT - PROBLEM SELECTOR PLAN 5
Monitor FS AC/HS.  Continue S/S Insulin.

## 2022-04-20 NOTE — PROVIDER CONTACT NOTE (OTHER) - REASON
Pt diaphoretic and states she needs something to calm her Pt diaphoretic and states she needs something to calm her. FS=79

## 2022-04-20 NOTE — PROGRESS NOTE ADULT - SUBJECTIVE AND OBJECTIVE BOX
Diagnosis    Protocol/Chemo Regimen:  Day:      Pt endorsed:    Review of Systems:      Pain scale:                                        Location:    Diet:     Allergies    No Known Allergies    Intolerances        ANTIMICROBIALS  levoFLOXacin  Tablet 250 milliGRAM(s) Oral every 24 hours  posaconazole DR Tablet 300 milliGRAM(s) Oral daily      HEME/ONC MEDICATIONS  heparin   Injectable 6500 Unit(s) IV Push every 6 hours PRN  heparin   Injectable 3000 Unit(s) IV Push every 6 hours PRN  heparin  Infusion. 800 Unit(s)/Hr IV Continuous <Continuous>  venetoclax 100 milliGRAM(s) Oral <User Schedule>      STANDING MEDICATIONS  allopurinol 300 milliGRAM(s) Oral daily  benzonatate 100 milliGRAM(s) Oral every 8 hours  Biotene Dry Mouth Oral Rinse 15 milliLiter(s) Swish and Spit three times a day  chlorhexidine 4% Liquid 1 Application(s) Topical <User Schedule>  dextrose 5%. 1000 milliLiter(s) IV Continuous <Continuous>  dextrose 5%. 1000 milliLiter(s) IV Continuous <Continuous>  dextrose 50% Injectable 25 Gram(s) IV Push once  dextrose 50% Injectable 12.5 Gram(s) IV Push once  dextrose 50% Injectable 25 Gram(s) IV Push once  famotidine    Tablet 20 milliGRAM(s) Oral daily  glucagon  Injectable 1 milliGRAM(s) IntraMuscular once  insulin glargine Injectable (LANTUS) 10 Unit(s) SubCutaneous at bedtime  insulin lispro (ADMELOG) corrective regimen sliding scale   SubCutaneous three times a day before meals  insulin lispro (ADMELOG) corrective regimen sliding scale   SubCutaneous at bedtime  insulin lispro Injectable (ADMELOG) 2 Unit(s) SubCutaneous three times a day before meals  metoprolol tartrate 50 milliGRAM(s) Oral two times a day  phytonadione   Solution 5 milliGRAM(s) Oral daily  sodium chloride 0.9%. 1000 milliLiter(s) IV Continuous <Continuous>  verapamil  milliGRAM(s) Oral daily      PRN MEDICATIONS  acetaminophen     Tablet .. 650 milliGRAM(s) Oral every 6 hours PRN  aluminum hydroxide/magnesium hydroxide/simethicone Suspension 30 milliLiter(s) Oral every 4 hours PRN  dextrose Oral Gel 15 Gram(s) Oral once PRN        Vital Signs Last 24 Hrs  T(C): 36.9 (20 Apr 2022 05:40), Max: 37.1 (19 Apr 2022 21:10)  T(F): 98.5 (20 Apr 2022 05:40), Max: 98.8 (19 Apr 2022 21:10)  HR: 73 (20 Apr 2022 06:28) (52 - 90)  BP: 120/77 (20 Apr 2022 05:40) (100/66 - 173/87)  BP(mean): --  RR: 18 (20 Apr 2022 05:40) (16 - 20)  SpO2: 100% (20 Apr 2022 06:28) (90% - 100%)    PHYSICAL EXAM  General: NAD, resting in bed.  HEENT: PERRLA, EOMOI, clear oropharynx  Neck: supple  CV: (+) S1/S2 RRR  Lungs: Crackles +  Abdomen: soft, non-tender, non-distended (+) BS  Ext: BLE pedal edema trace +.  Skin: no rashes and no petechiae  Neuro: alert and oriented X 3, no focal deficits  Central Line: PICC line in left UE,right PIV (arrow cath).    LABS:                      7.9    0.59  )-----------( 29       ( 20 Apr 2022 07:12 )             23.7         Mean Cell Volume : 90.8 fl  Mean Cell Hemoglobin : 30.3 pg  Mean Cell Hemoglobin Concentration : 33.3 gm/dL  Auto Neutrophil # : x  Auto Lymphocyte # : x  Auto Monocyte # : x  Auto Eosinophil # : x  Auto Basophil # : x  Auto Neutrophil % : x  Auto Lymphocyte % : x  Auto Monocyte % : x  Auto Eosinophil % : x  Auto Basophil % : x      04-20    136  |  103  |  19  ----------------------------<  143<H>  4.2   |  23  |  0.77    Ca    7.1<L>      20 Apr 2022 06:56  Phos  2.8     04-20  Mg     1.9     04-20    TPro  6.0  /  Alb  2.8<L>  /  TBili  0.8  /  DBili  x   /  AST  33  /  ALT  28  /  AlkPhos  63  04-20      Mg 1.9  Phos 2.8  Mg 2.2  Phos 3.8      PT/INR - ( 19 Apr 2022 13:57 )   PT: 15.3 sec;   INR: 1.32 ratio         PTT - ( 20 Apr 2022 03:26 )  PTT:74.3 sec      Uric Acid 3.0        RADIOLOGY & ADDITIONAL STUDIES:         Diagnosis: AML    Protocol/Chemo Regimen: Dacogen/Venetoclax (Venetoclax 20 mg on 4/15, 50 mg on 4/16 and continue 100 mg starting 4/17).    Day: 6     Pt endorsed: RRT overnight for dyspnea and hypoxia, placed on bipap, currently on bipap and resting comfortably     Review of Systems: denies nausea, vomiting, chest pain    Pain scale:  0                                         Diet: consistent carbohydrates  Allergies    No Known Allergies    Intolerances    ANTIMICROBIALS  levoFLOXacin  Tablet 250 milliGRAM(s) Oral every 24 hours  posaconazole DR Tablet 300 milliGRAM(s) Oral daily      HEME/ONC MEDICATIONS  heparin   Injectable 6500 Unit(s) IV Push every 6 hours PRN  heparin   Injectable 3000 Unit(s) IV Push every 6 hours PRN  heparin  Infusion. 800 Unit(s)/Hr IV Continuous <Continuous>  venetoclax 100 milliGRAM(s) Oral <User Schedule>      STANDING MEDICATIONS  allopurinol 300 milliGRAM(s) Oral daily  benzonatate 100 milliGRAM(s) Oral every 8 hours  Biotene Dry Mouth Oral Rinse 15 milliLiter(s) Swish and Spit three times a day  chlorhexidine 4% Liquid 1 Application(s) Topical <User Schedule>  dextrose 5%. 1000 milliLiter(s) IV Continuous <Continuous>  dextrose 5%. 1000 milliLiter(s) IV Continuous <Continuous>  dextrose 50% Injectable 25 Gram(s) IV Push once  dextrose 50% Injectable 12.5 Gram(s) IV Push once  dextrose 50% Injectable 25 Gram(s) IV Push once  famotidine    Tablet 20 milliGRAM(s) Oral daily  glucagon  Injectable 1 milliGRAM(s) IntraMuscular once  insulin glargine Injectable (LANTUS) 10 Unit(s) SubCutaneous at bedtime  insulin lispro (ADMELOG) corrective regimen sliding scale   SubCutaneous three times a day before meals  insulin lispro (ADMELOG) corrective regimen sliding scale   SubCutaneous at bedtime  insulin lispro Injectable (ADMELOG) 2 Unit(s) SubCutaneous three times a day before meals  metoprolol tartrate 50 milliGRAM(s) Oral two times a day  phytonadione   Solution 5 milliGRAM(s) Oral daily  sodium chloride 0.9%. 1000 milliLiter(s) IV Continuous <Continuous>  verapamil  milliGRAM(s) Oral daily      PRN MEDICATIONS  acetaminophen     Tablet .. 650 milliGRAM(s) Oral every 6 hours PRN  aluminum hydroxide/magnesium hydroxide/simethicone Suspension 30 milliLiter(s) Oral every 4 hours PRN  dextrose Oral Gel 15 Gram(s) Oral once PRN        Vital Signs Last 24 Hrs  T(C): 36.9 (20 Apr 2022 05:40), Max: 37.1 (19 Apr 2022 21:10)  T(F): 98.5 (20 Apr 2022 05:40), Max: 98.8 (19 Apr 2022 21:10)  HR: 73 (20 Apr 2022 06:28) (52 - 90)  BP: 120/77 (20 Apr 2022 05:40) (100/66 - 173/87)  BP(mean): --  RR: 18 (20 Apr 2022 05:40) (16 - 20)  SpO2: 100% (20 Apr 2022 06:28) (90% - 100%)    PHYSICAL EXAM  General: NAD, resting in bed.  HEENT: PERRLA, EOMOI, clear oropharynx  Neck: supple  CV: (+) S1/S2 RRR  Lungs: Crackles +  Abdomen: soft, non-tender, non-distended (+) BS  Ext: BLE pedal edema trace +.  Skin: no rashes and no petechiae  Neuro: alert and oriented X 3, no focal deficits  Central Line: PICC line in left UE,right PIV (arrow cath).    LABS:                      7.9    0.59  )-----------( 29       ( 20 Apr 2022 07:12 )             23.7         Mean Cell Volume : 90.8 fl  Mean Cell Hemoglobin : 30.3 pg  Mean Cell Hemoglobin Concentration : 33.3 gm/dL  Auto Neutrophil # : x  Auto Lymphocyte # : x  Auto Monocyte # : x  Auto Eosinophil # : x  Auto Basophil # : x  Auto Neutrophil % : x  Auto Lymphocyte % : x  Auto Monocyte % : x  Auto Eosinophil % : x  Auto Basophil % : x      04-20    136  |  103  |  19  ----------------------------<  143<H>  4.2   |  23  |  0.77    Ca    7.1<L>      20 Apr 2022 06:56  Phos  2.8     04-20  Mg     1.9     04-20    TPro  6.0  /  Alb  2.8<L>  /  TBili  0.8  /  DBili  x   /  AST  33  /  ALT  28  /  AlkPhos  63  04-20      Mg 1.9  Phos 2.8  Mg 2.2  Phos 3.8      PT/INR - ( 19 Apr 2022 13:57 )   PT: 15.3 sec;   INR: 1.32 ratio         PTT - ( 20 Apr 2022 03:26 )  PTT:74.3 sec      Uric Acid 3.0        RADIOLOGY & ADDITIONAL STUDIES:

## 2022-04-20 NOTE — PROGRESS NOTE ADULT - PROBLEM SELECTOR PLAN 9
4/16- Micaela anal wound, f/u wound care.

## 2022-04-20 NOTE — PROVIDER CONTACT NOTE (OTHER) - NAME OF MD/NP/PA/DO NOTIFIED:
LUIS Rose, PA
Adrianna HACKETT
LUIS HACKETT
LUIS Rose, PA
Mychal HACKETT
Mychal HACKETT
Robert Canela NP
Robert Canela NP
SARAH Ahmadi
Robert Canela NP
Kate NP
SARAH Ahmadi

## 2022-04-20 NOTE — PROGRESS NOTE ADULT - PROBLEM SELECTOR PROBLEM 3
Suspected deep vein thrombosis (DVT)

## 2022-04-20 NOTE — PROGRESS NOTE ADULT - PROBLEM SELECTOR PLAN 2
CTA chest showing acute sub segmental pulmonary artery embolus in the right upper lobe.   On heparin gtt for AC.  4/16- f/u APTT and  platelet count  Q 6 hrs, if platelet count less than 40K transfuse platelets,  once >40  resume Heparin full A/C, following full AC nomogram. CTA chest showing acute sub segmental pulmonary artery embolus in the right upper lobe.   On heparin gtt for AC.  4/16- f/u APTT and  platelet count  Q 6 hrs, if platelet count less than 40K transfuse platelets,  once >40  resume Heparin full A/C, following full AC nomogram.  4/20: Plts 29, heparin held, transfuse 1 unit plts will resume heparin when plt >40

## 2022-04-20 NOTE — PROVIDER CONTACT NOTE (OTHER) - ASSESSMENT
Pt. seems uncomfortable. V/S stable Pt. seems uncomfortable. V/S stable FS=79. Pt. refuses to eat anything.

## 2022-04-20 NOTE — AIRWAY PLACEMENT NOTE ADULT - DISPOSITION AFTER INTUBATION:
patient placed on mechanical ventilation
ambu bag- code still In progress/patient placed on mechanical ventilation

## 2022-04-20 NOTE — DIETITIAN INITIAL EVALUATION ADULT - PERTINENT LABORATORY DATA
04-20    136  |  103  |  19  ----------------------------<  143<H>  4.2   |  23  |  0.77    Ca    7.1<L>      20 Apr 2022 06:56  Phos  2.8     04-20  Mg     1.9     04-20    TPro  6.0  /  Alb  2.8<L>  /  TBili  0.8  /  DBili  x   /  AST  33  /  ALT  28  /  AlkPhos  63  04-20  POCT Blood Glucose.: 135 mg/dL (04-20-22 @ 07:54)  A1C with Estimated Average Glucose Result: 9.0 % (04-14-22 @ 03:13)

## 2022-04-20 NOTE — AIRWAY PLACEMENT NOTE ADULT - POST AIRWAY PLACEMENT ASSESSMENT:
breath sounds bilateral/breath sounds equal/CXR pending/chest excursion noted
breath sounds bilateral/breath sounds equal/positive end tidal CO2 noted/CXR pending/chest excursion noted/skin color improved

## 2022-04-20 NOTE — PROGRESS NOTE ADULT - PROBLEM SELECTOR PLAN 1
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   Peripheral Flow cytometry confirming CD33+, MPO+, CD34- AML. FLT3 and BCR-ABL pending   Pathologist Dr. Chua identified Antoni rods, thus ATRA (tretinoin) was started on 4/13 40mg q12h. Due to concern for possible differentiation syndrome given the patient's leukocytosis and suspected myeloid malignancy, started dexamethasone 10mg q12h on 4/13. FISH now confirming negative PML-LOREN on 4/14. 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 Foundation sent   G6PD pending   Monitor CBC with diff, transfuse as needed.  4/18- Platelets – 38, transfuse one unit platelets    Monitor electrolytes, replete as needed.  Strict I/o  Continue IVF, monitor tumor lysis labs BID  Mouth care.  4/15 Started Dacogen and Venetoclax  held venetoclax since 4/16, restarted 4/18/22 4/19 EPIC application given to family, plan to submit 4/21

## 2022-04-20 NOTE — CHART NOTE - NSCHARTNOTEFT_GEN_A_CORE
Code Blue Note    89F w/ T2DM, HTN presented with palpitations x 3 days and weakness x 1 day, found to have AML with concern for blast crises this admission. s/p MICU for urgent leukopharesis. Course here c/b respiratory distress found to have subsegmental  RUL PE was on heparin gtt. Most recently s/p RRT 4/19 for respiratory distress requiring BIPAP and diuresis.     Responded to Code Blue. Initially asystole then PEA with ROSC after ~15-20 minutes. Concern for worsening PE burden although was on heparin gtt until this AM (discontinued due to ?thrombocytopenia).  Patient also received epix5, bicarbx2, and calcium gluconatex2. Please refer to code sheet for full details. Patient intubated and started on levophed. Family made aware and transferred to MICU.     -Bell Castillo, PGY-2

## 2022-04-20 NOTE — PROVIDER CONTACT NOTE (OTHER) - SITUATION
Pt diaphoretic and states she needs something to calm her. Pt diaphoretic and states she needs something to calm her. FS=79

## 2022-04-20 NOTE — PROGRESS NOTE ADULT - ASSESSMENT
89F w/ T2DM, HTN who initially presented to the [facility] ED for palpitations x 3 days and weakness x 1 day., found to be in a-fib w/ RVR. Labs significant for + with 88% blasts concerning for acute leukemia, admitted to MICU for urgent leukaphereses, s/p  RIJ shiley and leukapheresis with improvement in counts, flow cytometry showed AML, bone marrow bx confirmed AML. Admitted to MICU s/p ROSC after cardiac arrest.    Neuro  -Intubated, off sedation and no purposeful movements  -CTM neurological status    CV  #cardiac arrest  On 4/20, had asystole leading to PEA, had 4 rounds of CPR with 5 rounds epi with 2 bicarb and 2 Ca. ROSC. then bradycardic, levo started  -Pt is now DNR per family's wishes, MOLST in chart    #afib  New afib noted this admission, CHADSVASC score 5, previously on heparin gtt for AC  -On SCDs, no AC given thrombocytopenia, no DVT  -Previously rate controlled with metoprolol 50mg bid, bradycardic currently    Resp  -Intubated s/p cardiac arrest for airway protection  -Hyperventilation on vent settings given metabolic lactic acidosis  -Diuresis with lasix, hypoxic respiratory failure  -Hx of subsegmental PE, previously on heparin gtt, stopped due to Hg drop 9 to 7 and worsening thrombocytopenia    #pneumothorax  -noted 4/20, iatrogenic from attempted OGT placement, chest tube placed  -incomplete reexpansion    GI  #Transaminitis  Noted after cardiac arrest, most likely 2/2 hypotension  CTM    -NPO    /renal  -Molina placed, monitor UOP    ID  #Pancytopenia  previously on levaquin and pasconazole for prophylaxis  -LLL consolidation noted on bedside US, started on vanc and zosyn 4/20    Endo  #T2DM  -low ISS    Heme/Onc  #AML  Initially presented with leukocytosis up to 270, 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 S/p leukopheresisx1, tretinoin, dexamethasone, and on decitabine and venetoclax  -Replete if Plt >10k or > 15k if febrile or > 50k if bleeding  -Appreciate onc recs    #TLS  TLS labs positive 4/12/22, given rasburicase  -f/u TLS labs daily    Ethics  -DNR, MOLST in chart.

## 2022-04-20 NOTE — DISCHARGE NOTE FOR THE EXPIRED PATIENT - REASON FOR ADMISSION
"This is an 89F w/ T2DM, HTN who initially presented to the [facility] ED for palpitations x 3 days and weakness x 1 day. In [facility] ED, pt in a-fib w/ RVR HR 160s, saturating well on RA, BP hypertensive, RR 14. Labs significant for + with 88% blasts concerning for acute leukemia. Heme consulted in ED, patient transferred to MICU for urgent leukaphereses, s/p  CHIOMA bro and leukapheresis with improvement in counts, flow cytometry showed AML, bone marrow bx confirmed AML, on Dacogen, held Venetoclax on Day 2, (received one dose)   Patient has pancytopenia secondary to disease condition."

## 2022-04-20 NOTE — PROGRESS NOTE ADULT - PROBLEM SELECTOR PLAN 7
Neutropenic, if febrile pan culture f/u culture results. Change Levaquin to Cefepime.  4/15- Start Levaquin and Posaconazole tody for prophylaxis. Neutropenic, if febrile pan culture f/u culture results. Change Levaquin to Cefepime.  cont Levaquin and Posaconazole for prophylaxis.

## 2022-04-20 NOTE — DIETITIAN INITIAL EVALUATION ADULT - OTHER INFO
Nutrition related events:   - Today is Day 5 on Venetoclax, started on 4/15, was stopped and restarted on 4/18.   - Palliative care is following for supportive care during the treatment of acute leukemia.  - Potassium noted to be elevated, now downtrending, WDL. Of note, pt is ordered for Phytonadione.   - Rapid response called after nutrition visit, pt currently transferred to MICU for respiratory distress s/p cardiac arrest.

## 2022-04-25 LAB
CHROM ANALY OVERALL INTERP SPEC-IMP: SIGNIFICANT CHANGE UP
CULTURE RESULTS: SIGNIFICANT CHANGE UP
SPECIMEN SOURCE: SIGNIFICANT CHANGE UP

## 2022-05-05 LAB — MISCELLANEOUS TEST NAME: SIGNIFICANT CHANGE UP

## 2022-06-08 NOTE — PATIENT PROFILE ADULT - FUNCTIONAL ASSESSMENT - BASIC MOBILITY SECTION LABEL
Patient admitted for syncope and collapse. PMH of MI, HTN, DM, CAD. Patient admitted for syncope and collapse. PMH of MI, HTN, DM, CAD. Patient admitted for syncope and collapse Patient admitted for syncope and collapse. PMH of MI, HTN, DM, CAD. Patient admitted for syncope and collapse. PMH of MI, HTN, DM, CAD. .

## 2022-06-29 NOTE — DIETITIAN INITIAL EVALUATION ADULT - ORAL INTAKE PTA/DIET HISTORY
Pt currently on BiPAP during time of visit. Unable to participate in nutrition assessment. No specific diet hx noted. Baseline tolerance to chewing/swallowing unknown; PER H&P, no chewing or swallowing difficulties noted, baseline provision of energy/ nutrient intake unclear. Pt with hx of DM, HbA1c 9.0 on 4/14 suggesting poor glycemic control. Per H&P, pt takes Dexamethasone, Lispro and Glargine at home.  Pt currently on BiPAP during time of visit. Unable to participate in nutrition assessment. No specific diet hx noted. Baseline tolerance to chewing/swallowing unknown; Per H&P, no chewing or swallowing difficulties noted, baseline provision of energy/ nutrient intake unclear. Pt with hx of DM, HbA1c 9.0% on 4/14 suggesting poor glycemic control. Per H&P, pt takes Dexamethasone, Lispro and Glargine at home.  No

## 2022-10-25 NOTE — ED PROVIDER NOTE - SEPSIS ALERT DATE/TIME
Ear Cerumen Removal    Date/Time: 10/25/2022 11:28 AM  Performed by: Mary Beaver NP  Authorized by: Mary Beaver NP     Consent Done?:  Not Needed    Local anesthetic:  None  Medication Used:  Other  Location details:  Both ears  Procedure type: curette    Cerumen  Removal Results:  Cerumen completely removed  Patient tolerance:  Patient tolerated the procedure well with no immediate complications   12-Apr-2022 16:57

## 2024-01-18 NOTE — CONSULT NOTE ADULT - TELEHEALTH VISIT TYPE
"Andrea Berry is a 59 y.o. male on day 42 of admission presenting with Pneumonia of right middle lobe due to infectious organism.    Subjective   Patient was not seen or examined.  Only chart was reviewed.  Patient had LP done today by IR.     -  Last Recorded Vitals  Blood pressure (!) 131/97, pulse 109, temperature 36.5 °C (97.7 °F), temperature source Tympanic, resp. rate 18, height 1.7 m (5' 6.93\"), weight 67.1 kg (148 lb), SpO2 97 %.  Intake/Output last 3 Shifts:  I/O last 3 completed shifts:  In: 560 (8.3 mL/kg) [NG/GT:560]  Out: 950 (14.2 mL/kg) [Urine:950 (0.4 mL/kg/hr)]  Weight: 67.1 kg     Relevant Results  Results from last 7 days   Lab Units 01/17/24  1622 01/17/24  1144 01/17/24  0755 01/17/24  0639 01/17/24  0559 01/17/24  0327 01/16/24  1203 01/16/24  0626 01/15/24  1810 01/15/24  0705 01/14/24  1223 01/14/24  0906 01/13/24  0553 01/13/24  0539   POCT GLUCOSE mg/dL 132* 145* 148* 165*  --  124*   < >  --    < >  --    < >  --    < >  --    GLUCOSE mg/dL  --   --   --   --  157*  --   --  154*  --  159*  --  170*  --  136*    < > = values in this interval not displayed.     Scheduled medications  amantadine, 100 mg, oral, Daily  brimonidine, 1 drop, Both Eyes, BID  desmopressin, 0.52 mcg, subcutaneous, BID  esomeprazole, 20 mg, g-tube, Daily  folic acid, 1 mg, g-tube, Daily  gabapentin, 100 mg, g-tube, TID  hydrocortisone, 10 mg, oral, Daily  hydrocortisone, 5 mg, oral, Daily with lunch  hydrocortisone, 5 mg, oral, Daily with evening meal  insulin glargine, 8 Units, subcutaneous, Daily  insulin regular, 0-10 Units, subcutaneous, q4h  insulin regular, 2 Units, subcutaneous, q6h ALICIA  lacosamide, 100 mg, g-tube, q12h ALICIA  lactobacillus acidophilus, 1 tablet, oral, Daily  latanoprost, 1 drop, Both Eyes, Nightly  levETIRAcetam, 500 mg, g-tube, BID  levothyroxine, 200 mcg, g-tube, Daily before breakfast  lidocaine PF, 5 mL, subcutaneous, Once  loperamide, 2 mg, oral, 4x daily  artificial tears, 1 drop, " Both Eyes, q6h  valproic acid, 250 mg, g-tube, 4x daily  zinc oxide, 1 Application, Topical, TID      Continuous medications     PRN medications  PRN medications: acetaminophen, dextrose 10 % in water (D10W), dextrose, glucagon, oxygen, polyethylene glycol     Results for orders placed or performed during the hospital encounter of 12/06/23 (from the past 24 hour(s))   POCT GLUCOSE   Result Value Ref Range    POCT Glucose 165 (H) 74 - 99 mg/dL   POCT GLUCOSE   Result Value Ref Range    POCT Glucose 124 (H) 74 - 99 mg/dL   Renal Function Panel   Result Value Ref Range    Glucose 157 (H) 74 - 99 mg/dL    Sodium 145 136 - 145 mmol/L    Potassium 4.7 3.5 - 5.3 mmol/L    Chloride 105 98 - 107 mmol/L    Bicarbonate 23 21 - 32 mmol/L    Anion Gap 22 (H) 10 - 20 mmol/L    Urea Nitrogen 30 (H) 6 - 23 mg/dL    Creatinine 0.90 0.50 - 1.30 mg/dL    eGFR >90 >60 mL/min/1.73m*2    Calcium 10.6 8.6 - 10.6 mg/dL    Phosphorus 5.2 (H) 2.5 - 4.9 mg/dL    Albumin 3.8 3.4 - 5.0 g/dL   Magnesium   Result Value Ref Range    Magnesium 2.12 1.60 - 2.40 mg/dL   CBC and Auto Differential   Result Value Ref Range    WBC 9.0 4.4 - 11.3 x10*3/uL    nRBC 0.0 0.0 - 0.0 /100 WBCs    RBC 4.24 (L) 4.50 - 5.90 x10*6/uL    Hemoglobin 12.0 (L) 13.5 - 17.5 g/dL    Hematocrit 38.9 (L) 41.0 - 52.0 %    MCV 92 80 - 100 fL    MCH 28.3 26.0 - 34.0 pg    MCHC 30.8 (L) 32.0 - 36.0 g/dL    RDW 16.2 (H) 11.5 - 14.5 %    Platelets 490 (H) 150 - 450 x10*3/uL    Neutrophils % 58.7 40.0 - 80.0 %    Immature Granulocytes %, Automated 0.3 0.0 - 0.9 %    Lymphocytes % 28.6 13.0 - 44.0 %    Monocytes % 6.9 2.0 - 10.0 %    Eosinophils % 5.2 0.0 - 6.0 %    Basophils % 0.3 0.0 - 2.0 %    Neutrophils Absolute 5.28 1.20 - 7.70 x10*3/uL    Immature Granulocytes Absolute, Automated 0.03 0.00 - 0.70 x10*3/uL    Lymphocytes Absolute 2.57 1.20 - 4.80 x10*3/uL    Monocytes Absolute 0.62 0.10 - 1.00 x10*3/uL    Eosinophils Absolute 0.47 0.00 - 0.70 x10*3/uL    Basophils Absolute  0.03 0.00 - 0.10 x10*3/uL   POCT GLUCOSE   Result Value Ref Range    POCT Glucose 165 (H) 74 - 99 mg/dL   POCT GLUCOSE   Result Value Ref Range    POCT Glucose 148 (H) 74 - 99 mg/dL   POCT GLUCOSE   Result Value Ref Range    POCT Glucose 145 (H) 74 - 99 mg/dL   Protein, CSF   Result Value Ref Range    Total Protein,  (H) 15 - 45 mg/dL   Glucose, CSF   Result Value Ref Range    Glucose, CSF 46 40 - 70 mg/dL   Meningitis Pathogen Panel, PCR    Specimen: Lumbar Puncture; Cerebrospinal Fluid   Result Value Ref Range    Color, CSF Colorless     Escherichia coli K1, CSF, PCR Not Detected Not Detected    Haemophilus influenzae, CSF, PCR Not Detected Not Detected    Listeria monocytogenes, CSF, PCR Not Detected Not Detected    Neisseria meningitidis, CSF, PCR Not Detected Not Detected    Strep. agalactiae, CSF, PCR Not Detected Not Detected    Strep.pneumoniae, CSF, PCR Not Detected Not Detected    Cytomegalovirus, CSF, PCR Not Detected Not Detected    Enterovirus, CSF, PCR Not Detected Not Detected    Human Herpesvirus 6, CSF, PCR Not Detected Not Detected    Herpes Simplex 1, CSF, PCR Not Detected Not Detected    Herpes Simplex 2, CSF, PCR Not Detected Not Detected    Human Parechovirus, CSF, PCR Not Detected Not Detected    Varicella Zoster CSF PCR Not Detected Not Detected    Cryptococcus, CSF, PCR Not Detected Not Detected   CSF Culture/Smear    Specimen: Lumbar Puncture; Cerebrospinal Fluid   Result Value Ref Range    Gram Stain (1+) Rare Polymorphonuclear leukocytes     Gram Stain No organisms seen    CSF Cell Count   Result Value Ref Range    Tube Number, CSF Tube 1       Color, CSF Pink (A) Colorless    Clarity, CSF Clear Clear    Color, Supernatant CSF Colorless      WBC, CSF 6 (H) 1 - 5 /uL    RBC, CSF 1,000 (H) 0 - 5 /uL   CSF Differential   Result Value Ref Range    Neutrophils %, Manual, CSF 12 (H) 0 - 5 %    Lymphocytes %, Manual, CSF 87 28 - 96 %    Mono/Macrophages %, Manual, CSF 1 (L) 16 - 56 %     Audio Only Eosinophils %, Manual, CSF 0 Rare %    Basophils %, Manual, CSF 0 Not Established %    Immature Granulocytes %, Manual, CSF 0 Not Established %    Blasts %, Manual, CSF 0 Not Established %    Unclassified Cells %, Manual, CSF 0 Not Established %    Plasma Cells %, Manual, CSF 0 Not Established %    Total Cells Counted,     POCT GLUCOSE   Result Value Ref Range    POCT Glucose 132 (H) 74 - 99 mg/dL                   Assessment/Plan   Principal Problem:    Pneumonia of right middle lobe due to infectious organism    59-year-old male with history of pituitary adenoma status post TSR 2013. Initially lost to follow-up - later presented in 7/2023 with headache and visual disturbance.  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  He underwent a resection on 7/21 which was c/b CSF leak, and pneumocephalus he went for revision on 7/29 and 8/2.  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  Patient failed spontaneous breathing trial and he is status post trach and PEG.  He was finally discharged to a skilled nursing home in October 2023. Regarding his pituitary function.  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 Patient is also diabetic. - type 2. Initially on  Lantus 10 units every morning, regular 8 units scale with tube feeds. He presented on 12/6 from his skilled nursing home with acute on chronic respiratory failure and hypernatremia of 156. His hospital course was complicated by aspiration pneumonia,  "mental status deteriorating, CSF studies pending for further evaluation.     Endocrinology consulted for the management of hypopituitarism, DI and diabetes.     DI/Hypernatermia:  Sodium 145 on 1/2/24  ---- I/O net on 1/2/24: 7475   Sodium 142 on 1/3/24  ----- I/O net: -880   Sodium 144 on 1/4/24  --- I/O net +1120  Sodium 142 on 1/5/24   Sodium 144 on 1/6/2024 --- urine output 2.5 L, I/O net almost euvolemic  Sodium 146 on 1/7/2024--- urine output 2.8 L, I/O- 1.5, \"urine is dark yellow in the bag\"  Sodium 151 on 1/8/2024 ---with documented urine output of 6050 mL (likely documentation error 3050x2).  Urine is yellow in collection bag and does not appear to be overtly  in uncontrolled DI  Sodium 146 on 1/9 with a documented urine output of 2150.  Sodium 139 on 1/10 with a documented urine output of approximately 3 L     Home 0.5 mcg of subcu desmopressin every 12 hours  -Repeat RFP in the afternoon.   -Continue 0.5 mcg of desmopressin every 12 hours  -c/w FWF to 300 mL every 6 hours   -BMP Daily     Central Hypothyroidism:  Patient was on 150 mcg's of levothyroxine that was started on 12/10.  Repeat Free T4 continued to be low at 0.7 on 12/19.  It was noted that the patient was receiving his levothyroxine with his PPI at exactly the same time.   Dose was increased to 200 mcg on 12/20.  Dose was maintained since with repeat labs on 12/26 showing a free T4 of 1.2, free T4 (1/3/24): 1.28   - Please ensure that his levothyroxine is  from his tube feeds by at least 1 hour before and 1 hour after administration.  --Levothyroxine should be  from all other meds especially PPI since it needs an acidic environment for absorption.  - Continue Levothyroxine of 200 mcg orally daily   - Repeat Free T4 on (1/18/24), ordered        Adrenal Insufficiency: Patient off antibiotics, last dose of antifungal day 1/7  -Continue 10 - 5 - 5 mg (decreased on 1/7)        Type 2 Diabetes:   While off tube feeds pending LP for " CSF studies:  -Keep off scheduled regular insulin 2 units every 6 hours  -Continue with sliding scale regular insulin, switch to every 4 hours  -Accu-Cheks every 4 hours  -Glargine from 8 units subcutaneously     Once tube feeds are resumed, can go back to initial antidiabetic regimen:  - Continue Lantus 15 units every 24 hours  -scheduled regular insulin 2 units every 6 hours  -- Continue with sliding Scale regular insulin #2 every 6 hours ( 2 units for every 50 more than 150)  - Accu-Chek every 6  - Hypoglycemia protocol  - Please inform endocrinology if tube feeds are held, rates are changed or patient switched back to bolus feeding    Endocrine pager [phone]   Case was discussed with Dr. Qiu who agrees with the management plan.

## 2024-02-19 NOTE — ED ADULT NURSE NOTE - NS ED NOTE  TALK SOMEONE YN
Abdomen , soft, nontender, nondistended , no guarding or rigidity , no masses palpable , normal bowel sounds , Liver and Spleen , no hepatomegaly present , no hepatosplenomegaly , liver nontender , spleen not palpable  RLQ COLOSTOMY. OBESE MALE. MIDLINE SCAR, ?VENTRAL HERNIA No

## 2024-06-19 NOTE — PROGRESS NOTE ADULT - SUBJECTIVE AND OBJECTIVE BOX
Major Shift Events:  A&Ox4. PERRLA. Able to follow commands and moves all extremities. 's-120 w/ occasional PVCs. On and off norepi for MAP goal >65. BLE edema.CMV 40% 410/14/5. Tolerated PS 10/5 for 5 hrs. 500 mL of LR and 200ml of 25% albumin given for hypotension. Thin, serosanguinous secretions with occasional clots, inline trach. TF through ND at goal of 70mL/hr with standard FWF. One large loose BM today. Mon w/ good UOP. Pressure injury to coccyx, multiple old CT sites. 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 CT x2 w/ large output on R. Hgb 6.8 1 unit PRBC given; Hgb recheck 6.5; orders obtained to give another unit PRBCs.     Plan: Pressure support trials as able. Hemodynamic monitoring, notify Medicine team of any changes of POC.     For vital signs and complete assessments, please see documentation flowsheets.       Goal Outcome Evaluation:      Plan of Care Reviewed With: patient    Overall Patient Progress: improvingOverall Patient Progress: improving    Outcome Evaluation: Denies pain; pressure supported for 4hr.       Diagnosis:    Protocol/Chemo Regimen:    Day:     Pt endorsed:    Review of Systems:     Pain scale:     Diet:     Allergies    No Known Allergies    Intolerances        ANTIMICROBIALS  levoFLOXacin  Tablet 250 milliGRAM(s) Oral every 24 hours  posaconazole DR Tablet 300 milliGRAM(s) Oral daily      HEME/ONC MEDICATIONS  decitabine IVPB (eMAR) 35 milliGRAM(s) IV Intermittent every 24 hours  heparin   Injectable 6500 Unit(s) IV Push every 6 hours PRN  heparin   Injectable 3000 Unit(s) IV Push every 6 hours PRN  heparin  Infusion.  Unit(s)/Hr IV Continuous <Continuous>      STANDING MEDICATIONS  allopurinol 300 milliGRAM(s) Oral daily  benzonatate 100 milliGRAM(s) Oral every 8 hours  Biotene Dry Mouth Oral Rinse 15 milliLiter(s) Swish and Spit three times a day  calcium acetate 667 milliGRAM(s) Oral four times a day with meals  dextrose 5%. 1000 milliLiter(s) IV Continuous <Continuous>  dextrose 5%. 1000 milliLiter(s) IV Continuous <Continuous>  dextrose 50% Injectable 25 Gram(s) IV Push once  dextrose 50% Injectable 12.5 Gram(s) IV Push once  dextrose 50% Injectable 25 Gram(s) IV Push once  famotidine    Tablet 20 milliGRAM(s) Oral daily  glucagon  Injectable 1 milliGRAM(s) IntraMuscular once  insulin glargine Injectable (LANTUS) 15 Unit(s) SubCutaneous at bedtime  insulin lispro (ADMELOG) corrective regimen sliding scale   SubCutaneous three times a day before meals  insulin lispro (ADMELOG) corrective regimen sliding scale   SubCutaneous at bedtime  insulin lispro Injectable (ADMELOG) 3 Unit(s) SubCutaneous three times a day before meals  metoprolol tartrate 50 milliGRAM(s) Oral two times a day  ondansetron Injectable 8 milliGRAM(s) IV Push every 24 hours  sodium chloride 0.9%. 1000 milliLiter(s) IV Continuous <Continuous>  verapamil  milliGRAM(s) Oral daily      PRN MEDICATIONS  acetaminophen     Tablet .. 650 milliGRAM(s) Oral every 6 hours PRN  aluminum hydroxide/magnesium hydroxide/simethicone Suspension 30 milliLiter(s) Oral every 4 hours PRN  dextrose Oral Gel 15 Gram(s) Oral once PRN        Vital Signs Last 24 Hrs  T(C): 36.4 (17 Apr 2022 05:38), Max: 37 (17 Apr 2022 01:50)  T(F): 97.6 (17 Apr 2022 05:38), Max: 98.6 (17 Apr 2022 01:50)  HR: 66 (17 Apr 2022 05:38) (59 - 84)  BP: 143/73 (17 Apr 2022 05:38) (99/63 - 146/77)  BP(mean): --  RR: 18 (17 Apr 2022 05:38) (16 - 18)  SpO2: 100% (17 Apr 2022 05:38) (93% - 100%)    PHYSICAL EXAM  General: NAD  HEENT: PERRLA, EOMOI, clear oropharynx, anicteric sclera, pink conjunctiva  Neck: supple  CV: (+) S1/S2 RRR  Lungs: clear to auscultation, no wheezes or rales  Abdomen: soft, non-tender, non-distended (+) BS  Ext: no clubbing, cyanosis or edema  Skin: no rashes and no petechiae  Neuro: alert and oriented X 3, no focal deficits  Central Line:     RECENT CULTURES:  04-12 @ 18:28  .Blood Blood-Peripheral  --  --  --    No growth to date.  --        LABS:                        x      x     )-----------( 73       ( 17 Apr 2022 04:26 )             x            Mean Cell Volume : 88.8 fl  Mean Cell Hemoglobin : 30.0 pg  Mean Cell Hemoglobin Concentration : 33.8 gm/dL  Auto Neutrophil # : x  Auto Lymphocyte # : x  Auto Monocyte # : x  Auto Eosinophil # : x  Auto Basophil # : x  Auto Neutrophil % : x  Auto Lymphocyte % : x  Auto Monocyte % : x  Auto Eosinophil % : x  Auto Basophil % : x      04-16    139  |  102  |  30<H>  ----------------------------<  216<H>  4.4   |  22  |  0.82    Ca    7.3<L>      16 Apr 2022 23:08  Phos  4.4     04-16  Mg     1.9     04-16    TPro  5.9<L>  /  Alb  3.0<L>  /  TBili  2.0<H>  /  DBili  x   /  AST  57<H>  /  ALT  32  /  AlkPhos  51  04-16      Mg 1.9  Phos 4.4      PT/INR - ( 16 Apr 2022 01:28 )   PT: 18.8 sec;   INR: 1.61 ratio         PTT - ( 17 Apr 2022 00:51 )  PTT:153.5 sec    LDH 1079  Uric Acid 3.0        RADIOLOGY & ADDITIONAL STUDIES:         Diagnosis: AML    Protocol/Chemo Regimen: Dacogen/Venetocla (Venetocalx 20 mg on 4/15, 50 mg on 4/16 and continue 100 mg starting 4/17).    Day: 2    Pt endorsed:   feeling better today  +CABRAL    Review of Systems: Denies any chest pain, palpitation, SOB abdominal pain.    Pain scale: Denies    Diet: Consistent carb     Allergies:  Known Allergies    ANTIMICROBIALS  levoFLOXacin  Tablet 250 milliGRAM(s) Oral every 24 hours  posaconazole DR Tablet 300 milliGRAM(s) Oral daily    HEME/ONC MEDICATIONS  decitabine IVPB (eMAR) 35 milliGRAM(s) IV Intermittent every 24 hours  heparin   Injectable 6500 Unit(s) IV Push every 6 hours PRN  heparin   Injectable 3000 Unit(s) IV Push every 6 hours PRN  heparin  Infusion.  Unit(s)/Hr IV Continuous <Continuous>    STANDING MEDICATIONS  allopurinol 300 milliGRAM(s) Oral daily  benzonatate 100 milliGRAM(s) Oral every 8 hours  Biotene Dry Mouth Oral Rinse 15 milliLiter(s) Swish and Spit three times a day  calcium acetate 667 milliGRAM(s) Oral four times a day with meals  dextrose 5%. 1000 milliLiter(s) IV Continuous <Continuous>  dextrose 5%. 1000 milliLiter(s) IV Continuous <Continuous>  dextrose 50% Injectable 25 Gram(s) IV Push once  dextrose 50% Injectable 12.5 Gram(s) IV Push once  dextrose 50% Injectable 25 Gram(s) IV Push once  famotidine    Tablet 20 milliGRAM(s) Oral daily  glucagon  Injectable 1 milliGRAM(s) IntraMuscular once  insulin glargine Injectable (LANTUS) 15 Unit(s) SubCutaneous at bedtime  insulin lispro (ADMELOG) corrective regimen sliding scale   SubCutaneous three times a day before meals  insulin lispro (ADMELOG) corrective regimen sliding scale   SubCutaneous at bedtime  insulin lispro Injectable (ADMELOG) 3 Unit(s) SubCutaneous three times a day before meals  metoprolol tartrate 50 milliGRAM(s) Oral two times a day  ondansetron Injectable 8 milliGRAM(s) IV Push every 24 hours  sodium chloride 0.9%. 1000 milliLiter(s) IV Continuous <Continuous>  verapamil  milliGRAM(s) Oral daily    PRN MEDICATIONS  acetaminophen     Tablet .. 650 milliGRAM(s) Oral every 6 hours PRN  aluminum hydroxide/magnesium hydroxide/simethicone Suspension 30 milliLiter(s) Oral every 4 hours PRN  dextrose Oral Gel 15 Gram(s) Oral once PRN    Vital Signs Last 24 Hrs  T(C): 36.4 (17 Apr 2022 05:38), Max: 37 (17 Apr 2022 01:50)  T(F): 97.6 (17 Apr 2022 05:38), Max: 98.6 (17 Apr 2022 01:50)  HR: 66 (17 Apr 2022 05:38) (59 - 84)  BP: 143/73 (17 Apr 2022 05:38) (99/63 - 146/77)  BP(mean): --  RR: 18 (17 Apr 2022 05:38) (16 - 18)  SpO2: 100% (17 Apr 2022 05:38) (93% - 100%)    PHYSICAL EXAM  General: NAD, resting in bed.  HEENT: PERRLA, EOMOI, clear oropharynx  Neck: supple  CV: (+) S1/S2 RRR  Lungs: Crackles +  Abdomen: soft, non-tender, non-distended (+) BS  Ext: no clubbing, cyanosis or edema  Skin: no rashes and no petechiae  Neuro: alert and oriented X 3, no focal deficits  Central Line: PICC line in left UE,right PIV (arrow cath), CDI    RECENT CULTURES:  04-12 @ 18:28  .Blood Blood-Peripheral  No growth to date.    LABS:                        x      x     )-----------( 68       ( 17 Apr 2022 11:05 )             x        Mean Cell Volume : 88.7 fl  Mean Cell Hemoglobin : 30.3 pg  Mean Cell Hemoglobin Concentration : 34.1 gm/dL  Auto Neutrophil # : 0.02 K/uL  Auto Lymphocyte # : 0.47 K/uL  Auto Monocyte # : 0.07 K/uL  Auto Eosinophil # : 0.00 K/uL  Auto Basophil # : 0.00 K/uL  Auto Neutrophil % : 4.0 %  Auto Lymphocyte % : 76.0 %  Auto Monocyte % : 12.0 %  Auto Eosinophil % : 0.0 %  Auto Basophil % : 0.0 %      04-17    140  |  104  |  23  ----------------------------<  71  4.0   |  25  |  0.70    Ca    7.6<L>      17 Apr 2022 07:38  Phos  3.2     04-17  Mg     1.9     04-17    TPro  5.9<L>  /  Alb  3.0<L>  /  TBili  1.8<H>  /  DBili  0.6<H>  /  AST  41<H>  /  ALT  30  /  AlkPhos  50  04-17    Mg 1.9  Phos 3.2  Mg 1.9  Phos 4.4  PT/INR - ( 16 Apr 2022 01:28 )   PT: 18.8 sec;   INR: 1.61 ratio     PTT - ( 17 Apr 2022 11:05 )  PTT:54.6 sec    Uric Acid 2.9    LDH 1079  Uric Acid 3.0    RADIOLOGY & ADDITIONAL STUDIES:  Xray Chest 1 View- PORTABLE-Urgent (Xray Chest 1 View- PORTABLE-Urgent .) (04.16.22 @ 13:08) >  Resolving infiltrates.       Diagnosis: AML    Protocol/Chemo Regimen: Dacogen/Venetocla (Venetocalx 20 mg on 4/15, 50 mg on 4/16 and continue 100 mg starting 4/17).    Day: 3    Pt endorsed:   feeling better today  +CABRAL    Review of Systems: Denies any chest pain, palpitation, SOB abdominal pain.    Pain scale: Denies    Diet: Consistent carb     Allergies:  Known Allergies    ANTIMICROBIALS  levoFLOXacin  Tablet 250 milliGRAM(s) Oral every 24 hours  posaconazole DR Tablet 300 milliGRAM(s) Oral daily    HEME/ONC MEDICATIONS  decitabine IVPB (eMAR) 35 milliGRAM(s) IV Intermittent every 24 hours  heparin   Injectable 6500 Unit(s) IV Push every 6 hours PRN  heparin   Injectable 3000 Unit(s) IV Push every 6 hours PRN  heparin  Infusion.  Unit(s)/Hr IV Continuous <Continuous>    STANDING MEDICATIONS  allopurinol 300 milliGRAM(s) Oral daily  benzonatate 100 milliGRAM(s) Oral every 8 hours  Biotene Dry Mouth Oral Rinse 15 milliLiter(s) Swish and Spit three times a day  calcium acetate 667 milliGRAM(s) Oral four times a day with meals  dextrose 5%. 1000 milliLiter(s) IV Continuous <Continuous>  dextrose 5%. 1000 milliLiter(s) IV Continuous <Continuous>  dextrose 50% Injectable 25 Gram(s) IV Push once  dextrose 50% Injectable 12.5 Gram(s) IV Push once  dextrose 50% Injectable 25 Gram(s) IV Push once  famotidine    Tablet 20 milliGRAM(s) Oral daily  glucagon  Injectable 1 milliGRAM(s) IntraMuscular once  insulin glargine Injectable (LANTUS) 15 Unit(s) SubCutaneous at bedtime  insulin lispro (ADMELOG) corrective regimen sliding scale   SubCutaneous three times a day before meals  insulin lispro (ADMELOG) corrective regimen sliding scale   SubCutaneous at bedtime  insulin lispro Injectable (ADMELOG) 3 Unit(s) SubCutaneous three times a day before meals  metoprolol tartrate 50 milliGRAM(s) Oral two times a day  ondansetron Injectable 8 milliGRAM(s) IV Push every 24 hours  sodium chloride 0.9%. 1000 milliLiter(s) IV Continuous <Continuous>  verapamil  milliGRAM(s) Oral daily    PRN MEDICATIONS  acetaminophen     Tablet .. 650 milliGRAM(s) Oral every 6 hours PRN  aluminum hydroxide/magnesium hydroxide/simethicone Suspension 30 milliLiter(s) Oral every 4 hours PRN  dextrose Oral Gel 15 Gram(s) Oral once PRN    Vital Signs Last 24 Hrs  T(C): 36.4 (17 Apr 2022 05:38), Max: 37 (17 Apr 2022 01:50)  T(F): 97.6 (17 Apr 2022 05:38), Max: 98.6 (17 Apr 2022 01:50)  HR: 66 (17 Apr 2022 05:38) (59 - 84)  BP: 143/73 (17 Apr 2022 05:38) (99/63 - 146/77)  BP(mean): --  RR: 18 (17 Apr 2022 05:38) (16 - 18)  SpO2: 100% (17 Apr 2022 05:38) (93% - 100%)    PHYSICAL EXAM  General: NAD, resting in bed.  HEENT: PERRLA, EOMOI, clear oropharynx  Neck: supple  CV: (+) S1/S2 RRR  Lungs: Crackles +  Abdomen: soft, non-tender, non-distended (+) BS  Ext: no clubbing, cyanosis or edema  Skin: no rashes and no petechiae  Neuro: alert and oriented X 3, no focal deficits  Central Line: PICC line in left UE,right PIV (arrow cath), CDI    RECENT CULTURES:  04-12 @ 18:28  .Blood Blood-Peripheral  No growth to date.    LABS:                        x      x     )-----------( 68       ( 17 Apr 2022 11:05 )             x        Mean Cell Volume : 88.7 fl  Mean Cell Hemoglobin : 30.3 pg  Mean Cell Hemoglobin Concentration : 34.1 gm/dL  Auto Neutrophil # : 0.02 K/uL  Auto Lymphocyte # : 0.47 K/uL  Auto Monocyte # : 0.07 K/uL  Auto Eosinophil # : 0.00 K/uL  Auto Basophil # : 0.00 K/uL  Auto Neutrophil % : 4.0 %  Auto Lymphocyte % : 76.0 %  Auto Monocyte % : 12.0 %  Auto Eosinophil % : 0.0 %  Auto Basophil % : 0.0 %      04-17    140  |  104  |  23  ----------------------------<  71  4.0   |  25  |  0.70    Ca    7.6<L>      17 Apr 2022 07:38  Phos  3.2     04-17  Mg     1.9     04-17    TPro  5.9<L>  /  Alb  3.0<L>  /  TBili  1.8<H>  /  DBili  0.6<H>  /  AST  41<H>  /  ALT  30  /  AlkPhos  50  04-17    Mg 1.9  Phos 3.2  Mg 1.9  Phos 4.4  PT/INR - ( 16 Apr 2022 01:28 )   PT: 18.8 sec;   INR: 1.61 ratio     PTT - ( 17 Apr 2022 11:05 )  PTT:54.6 sec    Uric Acid 2.9    LDH 1079  Uric Acid 3.0    RADIOLOGY & ADDITIONAL STUDIES:  Xray Chest 1 View- PORTABLE-Urgent (Xray Chest 1 View- PORTABLE-Urgent .) (04.16.22 @ 13:08) >  Resolving infiltrates.       Diagnosis: AML    Protocol/Chemo Regimen: Dacogen/Venetocla (Venetocalx 20 mg on 4/15, 50 mg on 4/16 and continue 100 mg starting 4/17).    Day: 3    Pt endorsed:   feeling better today  +CABRAL    Review of Systems: Denies any chest pain, palpitation, SOB abdominal pain.    Pain scale: Denies    Diet: Consistent carb     Allergies:  Known Allergies    ANTIMICROBIALS  levoFLOXacin  Tablet 250 milliGRAM(s) Oral every 24 hours  posaconazole DR Tablet 300 milliGRAM(s) Oral daily    HEME/ONC MEDICATIONS  decitabine IVPB (eMAR) 35 milliGRAM(s) IV Intermittent every 24 hours  heparin   Injectable 6500 Unit(s) IV Push every 6 hours PRN  heparin   Injectable 3000 Unit(s) IV Push every 6 hours PRN  heparin  Infusion.  Unit(s)/Hr IV Continuous <Continuous>    STANDING MEDICATIONS  allopurinol 300 milliGRAM(s) Oral daily  benzonatate 100 milliGRAM(s) Oral every 8 hours  Biotene Dry Mouth Oral Rinse 15 milliLiter(s) Swish and Spit three times a day  calcium acetate 667 milliGRAM(s) Oral four times a day with meals  dextrose 5%. 1000 milliLiter(s) IV Continuous <Continuous>  dextrose 5%. 1000 milliLiter(s) IV Continuous <Continuous>  dextrose 50% Injectable 25 Gram(s) IV Push once  dextrose 50% Injectable 12.5 Gram(s) IV Push once  dextrose 50% Injectable 25 Gram(s) IV Push once  famotidine    Tablet 20 milliGRAM(s) Oral daily  glucagon  Injectable 1 milliGRAM(s) IntraMuscular once  insulin glargine Injectable (LANTUS) 15 Unit(s) SubCutaneous at bedtime  insulin lispro (ADMELOG) corrective regimen sliding scale   SubCutaneous three times a day before meals  insulin lispro (ADMELOG) corrective regimen sliding scale   SubCutaneous at bedtime  insulin lispro Injectable (ADMELOG) 3 Unit(s) SubCutaneous three times a day before meals  metoprolol tartrate 50 milliGRAM(s) Oral two times a day  ondansetron Injectable 8 milliGRAM(s) IV Push every 24 hours  sodium chloride 0.9%. 1000 milliLiter(s) IV Continuous <Continuous>  verapamil  milliGRAM(s) Oral daily    PRN MEDICATIONS  acetaminophen     Tablet .. 650 milliGRAM(s) Oral every 6 hours PRN  aluminum hydroxide/magnesium hydroxide/simethicone Suspension 30 milliLiter(s) Oral every 4 hours PRN  dextrose Oral Gel 15 Gram(s) Oral once PRN    Vital Signs Last 24 Hrs  T(C): 36.4 (17 Apr 2022 05:38), Max: 37 (17 Apr 2022 01:50)  T(F): 97.6 (17 Apr 2022 05:38), Max: 98.6 (17 Apr 2022 01:50)  HR: 66 (17 Apr 2022 05:38) (59 - 84)  BP: 143/73 (17 Apr 2022 05:38) (99/63 - 146/77)  BP(mean): --  RR: 18 (17 Apr 2022 05:38) (16 - 18)  SpO2: 100% (17 Apr 2022 05:38) (93% - 100%)    PHYSICAL EXAM  General: NAD, resting in bed.  HEENT: PERRLA, EOMOI, clear oropharynx  Neck: supple  CV: (+) S1/S2 RRR  Lungs: Crackles +  Abdomen: soft, non-tender, non-distended (+) BS  Ext: no clubbing, cyanosis or edema  Skin: no rashes and no petechiae  Neuro: alert and oriented X 3, no focal deficits  Central Line: PICC line in left UE,right PIV (arrow cath), CDI, CHIOMA Mendiola    RECENT CULTURES:  04-12 @ 18:28  .Blood Blood-Peripheral  No growth to date.    LABS:                        x      x     )-----------( 68       ( 17 Apr 2022 11:05 )             x        Mean Cell Volume : 88.7 fl  Mean Cell Hemoglobin : 30.3 pg  Mean Cell Hemoglobin Concentration : 34.1 gm/dL  Auto Neutrophil # : 0.02 K/uL  Auto Lymphocyte # : 0.47 K/uL  Auto Monocyte # : 0.07 K/uL  Auto Eosinophil # : 0.00 K/uL  Auto Basophil # : 0.00 K/uL  Auto Neutrophil % : 4.0 %  Auto Lymphocyte % : 76.0 %  Auto Monocyte % : 12.0 %  Auto Eosinophil % : 0.0 %  Auto Basophil % : 0.0 %      04-17    140  |  104  |  23  ----------------------------<  71  4.0   |  25  |  0.70    Ca    7.6<L>      17 Apr 2022 07:38  Phos  3.2     04-17  Mg     1.9     04-17    TPro  5.9<L>  /  Alb  3.0<L>  /  TBili  1.8<H>  /  DBili  0.6<H>  /  AST  41<H>  /  ALT  30  /  AlkPhos  50  04-17    Mg 1.9  Phos 3.2  Mg 1.9  Phos 4.4  PT/INR - ( 16 Apr 2022 01:28 )   PT: 18.8 sec;   INR: 1.61 ratio     PTT - ( 17 Apr 2022 11:05 )  PTT:54.6 sec    Uric Acid 2.9    LDH 1079  Uric Acid 3.0    RADIOLOGY & ADDITIONAL STUDIES:  Xray Chest 1 View- PORTABLE-Urgent (Xray Chest 1 View- PORTABLE-Urgent .) (04.16.22 @ 13:08) >  Resolving infiltrates.

## 2024-10-02 NOTE — PATIENT PROFILE ADULT - MONEY FOR FOOD
Addended by: NADEEN VALERO on: 10/2/2024 05:07 PM     Modules accepted: Orders    
Addended by: SHELTON BOOTHE on: 10/2/2024 03:19 PM     Modules accepted: Orders    
no

## 2025-02-07 NOTE — PATIENT PROFILE ADULT - FALL HARM RISK - FALLEN IN PAST
The spot on the right cheek is a Seborrheic Keratosis - a benign skin growth that is genetic and has no potential to turn into a skin cancer. They are typically raised and rough in texture and can change shape and color over time. Moles are typically flat and squishy in texture.       Recognizing Skin Cancer    Doing monthly skin checkups is the best way to find new marks or skin changes. During your skin checkups, be sure to follow the ABCDEs of skin checks. This means checking moles or other growths for Asymmetry, Border, Color, Diameter, and Evolving (changing). Note, too, any new growths, or, if any of your growths bleed, itch, or are painful.    The ABCDEs of skin checks    Check your moles or growths for signs of melanoma using ABCDE:     Asymmetry: the sides of the mole or growth don't match     Border: the edges are ragged, notched, or blurred     Color: the color within the mole or growth varies     Diameter: the mole or growth is larger than 6 mm (size of a pencil eraser)     Evolving: the size, shape, or color of the mole or growth is changing      In addition to the ABCDEs, other warning signs of skin cancer include:     A spot or mole that looks different from all other marks on your skin     Changes in how an area feels, such as itching, tenderness, or pain     Changes in the skin's surface, such as oozing, bleeding, or scaliness     A sore that does not heal     New swelling or redness beyond the border of a mole    Who's at risk?  Anyone can get skin cancer. But you are at greater risk if you have:     Fair skin, light-colored hair, or light-colored eyes     Many moles or abnormal moles on your skin     A history of sunburns from sunlight or tanning beds     A family history of skin cancer     A history of exposure to radiation or chemicals     A weakened immune system  Also, a personal history of skin cancer puts you at risk for recurring skin cancer.    How to check your skin  You can watch the  video at this link to show you how to do a self skin-check: https://youtu.be/UnCUcFJJDSA  OR scan this QR Code on your phone:     Do your monthly skin checkups in front of a full-length mirror. Check all parts of your body, including your:      Head (ears, face, neck, and scalp)     Torso (front, back, and sides)     Arms (tops, undersides, upper, and lower armpits)     Hands (palms, backs, and fingers, including under the nails)     Buttocks and genitals     Legs (front, back, and sides)     Feet (tops, soles, toes, including under the nails, and between toes)  If you have a lot of moles, take digital photos of them each month. Make sure to take photos both up close and from a distance. These can help you see if any moles change over time.    When to seek medical treatment  Most skin changes are not cancer. But if you see any changes in your skin, call your doctor right away. Only he or she can diagnose a problem. If you have skin cancer, seeing your doctor can be the first step toward getting the treatment that could save your life.    How to detect melanoma  http://[email or web address].org/ljqcsqnlbia-q-kv-z/diseases-and-treatments/m---[email or web address]/signs-symptoms     How to detect other forms of skin cancer  http://www.aad.org/grjlritburs-z-en-z/diseases-and-treatments/q---t/skin-cancer      METHODS FOR SUN PROTECTION     The preferred choice to protect you from the sun is to avoid going in the sun.    2.   The next best choice for sun protection is to wear protective clothing. This includes wearing a broad rimmed hat, long sleeves, high collars and tight weaved clothing. Specific clothing lines are made for people with needs for more complete sun avoidance.    3.   When the above options are not possible or for areas not covered by clothing a broad spectrum, (SPF 30 or higher) sunblock should be used.    4.   The Ultraviolet B (burning) wavelengths of sunlight are most intense duringthe non-winter seasons. In southern climates  it is present year round. Mid-day sun (10am-4pm) provides the most intensity.    5.   The Ultraviolet A wavelengths of sunlight are present year round, from samantha to dusk andwill pass through window glass unabated. This type of ultraviolet is the form seen in tanning beds. It will accentuate sunburns and produces most of the deeper sunlight induced aging changes because it passes much deeperinto the skin than Ultraviolet B.    6.   Broad spectrum sunblocks that have either microfine zinc oxide (z-soumya) or parsol (avobenzone) provide better UVA coverage and are therefore considered to be broad spectrum.    7.   The SPF number provides information about the relative amount of protection provided by a product. In studies, most people use their sunblock much more sparingly than is done in the SPF determination tests. The averageperson gets only one half to one third of the SPF that is on the label. Therefore, while an SPF 15 product may be sufficient in theory, in real life it is best to use a higher SPF sunblock.    SUN PROTECTIVE CLOTHING  Cover your head with a wide-brimmed hat (4 inches or wider).  Clothes are an excellent source of protection from the sun, but not all clothing offers the same amount of protection from UV light.  Wet clothing provides almost NO protection from UV.  Dark clothes are better than white clothes at filtering out UV, but weave and fabric type are stronger determinants of how much protection will be offered than color. Thicker weaves and clothes made of nylon or Dacron provide comparatively more protection than cotton.  Specially designed \"UPF\" (UV protective factor) clothing provides great protection from the sun without the need for reapplication of sunscreen. Look for UPF 30 or higher, and be aware that the UPF factor can decrease over a lifetime of the clothing as it is used.  Brand of UPF clothing doesn’t matter. Common manufacturers include: Coolibar, UV Skinz, [place], [place]  etc. Please visit the Skin Cancer Foundation at  http://www.skincancer.org/products/categories for more examples of brands    Learn more about sun protection, skin aging and skin cancer at these other websites:  American Academy of Dermatology: www.aad.org   American Society for Dermatologic Surgery: www.asds.net   American College of Mohs Surgery: http://www.skincancermohssurgery.org  After Transplantation, Reduce Incidence of Skin Cancer (for transplant patients): http://www.at-risc.org/    SUNSCREEN  SPF (Sun Protection Factor) is a rating of the amount of protection from UVB radiation offered by a sunscreen.  Choose sunscreen with an SPF of a at least >30; higher numbers are more effective, but also more expensive.  An SPF of 30+ should suffice for most activities, as long as reapplication directions are followed according to the 's instructions.  Choose sunscreen that is “Broad Spectrum”, which will protect against UVA and UVB rays. Two classes of sunscreens exist:     Chemical absorbers: common ingredients that protect against UVB include cinnamates, salicylates, PABA, padimate-O, octocrylene, octinoxate. Common ingredients that protect against UVA in chemical absorbers: avobenzone (parsol 1789), benzophenone, oxybenzone, and mexoryl.  *Oxybenzone is highly damaging to coral reefs and allergenic, meaning many patients can develop an allergic rash to it, so I do not recommend products with it.  Physical blockers: the physical blockers titanium dioxide and zinc oxide protect against BOTH UVA and UVB. Of the two, zinc oxide does a better job.  Physical blocker sunscreens are recommended for people with sensitive skin and children over 6 months of age.  All of the big sunscreen manufacturers such as Neutrogena and Coppertone offer both physical and chemical sunscreen lines.  Tinted sunscreens: these typically contain ferric oxide, which has been shown to filter out certain parts of white or so-called  \"visible\" light--all the colors of the rainbow such as red, orange, yellow, green, blue, and violet light that we can see and are exposed to from lamps, streetlights, TV/computer/cell phone screens, etc.  Tinted sunscreens are usually only recommended for a handful of skin conditions, the most common of which is melasma.    SUNSCREEN APPLICATION TECHNIQUES  Apply sunscreen 15 minutes before sun exposure and use enough to generously coat all exposed skin. Reapply to all exposed areas every 2 hours, or every 80 minutes if using a water- or sweat-resistant sunscreen. It takes about 1 ounce of sunscreen to cover exposed surfaces in an adult, so the meticulous patient will go through 1 beach-sized bottle of sunscreen (~6.4oz) a week.  The formulation of sunscreen is up to you! Many men prefer sprays for hair-bearing areas, while children often like the feel of foams.  Whether you use a sunscreen cream, gel, stick, spray or foam is a matter of personal preference as long as you apply enough of it.      Ultimately, if you follow the above guidelines, the brand of sunscreen is irrelevant. I recommend physical blocking agents whenever possible, but the best sunscreen is the one that you will use!!! Common examples of brands that offer broad spectrum protection are: Neutrogena, Coppertone, Blue Lizard, SunBum, Bare Republic, LaRoche-Posay, and Hang Ten.  Some highly rated sunscreens are listed below.    Most highly rated and recommended sunscreens from recent Consumer Reports article 2024:    Lotions:   Coppertone Water Babies Lotion SPF 50 {Score 100)  2.   La Roche-Posay Anthelios Kids Lotion SPF 50 {Score 90)  3.   Everyday Humans Oh My Bod! Lotion SPF 50 {Score 83)  4.   Eucerin Advanced Hydration Lotion SPF 30 {Score 75)  5.   La Roche-Posay Anthelios Melt-In Milk Lotion SPF 60 {Score 73)  6.   Alba Botanica Hawaiian Lotion SPF 30 Aloe Vera {Score 70)  7.   Dermalogica Sport Protection Lotion SPF 50 {Score 68)  8.    Equate ([employer]) Ultra Lotion SPF 50 {Score 68)    Sprays:    Eucerin Advanced Hydration Spray SPF 50 {Score 88)  2.  Black Girl Sunscreen Kids Spray & Play SPF 50 {Score 77)  3.   Jerson's Spray SPF 50+ {Score 74)  4.  Neutrogena Beach Defense Water + Sun Protection Spray SPF 50 {Score 71)  5.  Black Girl Sunscreen Make It Glow Spray SPF 30 {Score 68)  6.  Sun Bum Premium Spray SPF 50 {Score 67)     The [employer] Award for Exceptional Medical Assistants     Did you receive exceptional care from a Medical Assistant?     Want to let them know they made a difference?     Please share with us by accessing the online nomination form:     Scan QR Code below for Electronic Nomination Form:                                                                                              You may receive an electronic survey after this visit.  We would appreciate your feedback.      Thank you for choosing [facility] and we hope you have a great rest of your day.      Dr. Iglesias & Leonila            No

## 2025-05-20 NOTE — ED ADULT TRIAGE NOTE - CHIEF COMPLAINT QUOTE
Pt c/o weakness, faintness, cough, heart racing x2 weeks.  Saw MD and was told she has a cold but symptoms are getting worse.  Was given antibiotics with no relief Alert and oriented to person, place and time